# Patient Record
Sex: MALE | Race: WHITE | Employment: OTHER | URBAN - NONMETROPOLITAN AREA
[De-identification: names, ages, dates, MRNs, and addresses within clinical notes are randomized per-mention and may not be internally consistent; named-entity substitution may affect disease eponyms.]

---

## 2017-06-09 LAB
ANION GAP SERPL CALCULATED.3IONS-SCNC: 14 MMOL/L (ref 7–19)
BUN BLDV-MCNC: 14 MG/DL (ref 8–23)
CALCIUM SERPL-MCNC: 8.8 MG/DL (ref 8.8–10.2)
CHLORIDE BLD-SCNC: 102 MMOL/L (ref 98–111)
CO2: 27 MMOL/L (ref 22–29)
CREAT SERPL-MCNC: 0.8 MG/DL (ref 0.5–1.2)
GFR NON-AFRICAN AMERICAN: >60
GLUCOSE BLD-MCNC: 98 MG/DL (ref 74–109)
POTASSIUM SERPL-SCNC: 3.6 MMOL/L (ref 3.5–5)
SODIUM BLD-SCNC: 143 MMOL/L (ref 136–145)

## 2017-06-12 ENCOUNTER — OFFICE VISIT (OUTPATIENT)
Dept: INTERNAL MEDICINE | Age: 62
End: 2017-06-12
Payer: COMMERCIAL

## 2017-06-12 VITALS
HEIGHT: 66 IN | HEART RATE: 78 BPM | SYSTOLIC BLOOD PRESSURE: 126 MMHG | DIASTOLIC BLOOD PRESSURE: 82 MMHG | WEIGHT: 210 LBS | OXYGEN SATURATION: 96 % | TEMPERATURE: 97.6 F | BODY MASS INDEX: 33.75 KG/M2

## 2017-06-12 DIAGNOSIS — E66.3 OVERWEIGHT: Primary | ICD-10-CM

## 2017-06-12 DIAGNOSIS — I10 ESSENTIAL HYPERTENSION: ICD-10-CM

## 2017-06-12 DIAGNOSIS — F41.9 ANXIETY: ICD-10-CM

## 2017-06-12 DIAGNOSIS — R42 VERTIGO: ICD-10-CM

## 2017-06-12 PROCEDURE — 99213 OFFICE O/P EST LOW 20 MIN: CPT | Performed by: NURSE PRACTITIONER

## 2017-06-12 RX ORDER — CLINDAMYCIN HYDROCHLORIDE 300 MG/1
CAPSULE ORAL
COMMUNITY
Start: 2017-06-10 | End: 2018-04-17 | Stop reason: CLARIF

## 2017-06-12 RX ORDER — LORAZEPAM 1 MG/1
1 TABLET ORAL 2 TIMES DAILY
Qty: 60 TABLET | Refills: 0 | Status: SHIPPED | OUTPATIENT
Start: 2017-06-12 | End: 2017-07-24 | Stop reason: SDUPTHER

## 2017-06-12 RX ORDER — PHENTERMINE HYDROCHLORIDE 37.5 MG/1
37.5 TABLET ORAL
Qty: 30 TABLET | Refills: 0 | Status: SHIPPED | OUTPATIENT
Start: 2017-06-12 | End: 2017-07-12

## 2017-06-12 RX ORDER — BUPROPION HYDROCHLORIDE 75 MG/1
TABLET ORAL
Qty: 360 TABLET | Refills: 3 | Status: SHIPPED | OUTPATIENT
Start: 2017-06-12 | End: 2018-06-12 | Stop reason: SDUPTHER

## 2017-06-12 RX ORDER — MECLIZINE HYDROCHLORIDE 25 MG/1
25 TABLET ORAL 3 TIMES DAILY PRN
Qty: 180 TABLET | Refills: 3 | Status: SHIPPED | OUTPATIENT
Start: 2017-06-12 | End: 2019-09-10 | Stop reason: SDUPTHER

## 2017-06-12 ASSESSMENT — ENCOUNTER SYMPTOMS
COLOR CHANGE: 1
EYES NEGATIVE: 1
GASTROINTESTINAL NEGATIVE: 1
RESPIRATORY NEGATIVE: 1

## 2017-06-22 ENCOUNTER — HOSPITAL ENCOUNTER (EMERGENCY)
Facility: HOSPITAL | Age: 62
Discharge: HOME | End: 2017-06-22
Admitting: NURSE PRACTITIONER

## 2017-06-22 ENCOUNTER — APPOINTMENT (OUTPATIENT)
Dept: CT IMAGING | Facility: HOSPITAL | Age: 62
End: 2017-06-22

## 2017-06-22 ENCOUNTER — APPOINTMENT (OUTPATIENT)
Dept: GENERAL RADIOLOGY | Facility: HOSPITAL | Age: 62
End: 2017-06-22

## 2017-06-22 VITALS
RESPIRATION RATE: 17 BRPM | TEMPERATURE: 98.2 F | WEIGHT: 210.2 LBS | HEART RATE: 90 BPM | BODY MASS INDEX: 33.78 KG/M2 | DIASTOLIC BLOOD PRESSURE: 84 MMHG | SYSTOLIC BLOOD PRESSURE: 148 MMHG | OXYGEN SATURATION: 96 % | HEIGHT: 66 IN

## 2017-06-22 DIAGNOSIS — S43.401A SPRAIN SHOULDER/ARM, RIGHT, INITIAL ENCOUNTER: ICD-10-CM

## 2017-06-22 DIAGNOSIS — S20.212A CONTUSION OF RIB ON LEFT SIDE, INITIAL ENCOUNTER: ICD-10-CM
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
## 2017-06-22 PROCEDURE — 70450 CT HEAD/BRAIN W/O DYE: CPT

## 2017-06-22 PROCEDURE — 71020 HC CHEST PA AND LATERAL: CPT

## 2017-06-22 PROCEDURE — 73130 X-RAY EXAM OF HAND: CPT

## 2017-06-22 PROCEDURE — 99283 EMERGENCY DEPT VISIT LOW MDM: CPT

## 2017-06-22 PROCEDURE — 73090 X-RAY EXAM OF FOREARM: CPT

## 2017-06-22 RX ORDER — MECLIZINE HYDROCHLORIDE 25 MG/1
TABLET ORAL
COMMUNITY
Start: 2017-06-12 | End: 2017-09-10

## 2017-06-22 RX ORDER — PHENTERMINE HYDROCHLORIDE 37.5 MG/1
TABLET ORAL
COMMUNITY
Start: 2017-06-12 | End: 2017-07-12

## 2017-06-22 RX ORDER — BUPROPION HYDROCHLORIDE 75 MG/1
TABLET ORAL
COMMUNITY
Start: 2017-06-12 | End: 2022-05-10

## 2017-06-22 RX ORDER — SIMVASTATIN 20 MG
20 TABLET ORAL NIGHTLY
COMMUNITY

## 2017-06-22 RX ORDER — GABAPENTIN 300 MG/1
CAPSULE ORAL
COMMUNITY
End: 2022-05-10

## 2017-06-22 RX ORDER — IRBESARTAN 300 MG/1
300 TABLET ORAL DAILY
COMMUNITY

## 2017-06-22 RX ORDER — MELOXICAM 7.5 MG/1
7.5 TABLET ORAL DAILY
Qty: 15 TABLET | Refills: 0 | Status: SHIPPED | OUTPATIENT
Start: 2017-06-22 | End: 2022-05-10

## 2017-06-22 RX ORDER — ACETAMINOPHEN AND CODEINE PHOSPHATE 60; 300 MG/1; MG/1
1 TABLET ORAL EVERY 6 HOURS PRN
COMMUNITY

## 2017-06-22 RX ORDER — CLONIDINE HYDROCHLORIDE 0.1 MG/1
TABLET ORAL
COMMUNITY
End: 2022-05-10

## 2017-06-22 RX ORDER — FUROSEMIDE 20 MG/1
20 TABLET ORAL DAILY
COMMUNITY

## 2017-06-22 RX ORDER — CYCLOBENZAPRINE HCL 10 MG
10 TABLET ORAL 3 TIMES DAILY PRN
Qty: 15 TABLET | Refills: 0 | Status: SHIPPED | OUTPATIENT
Start: 2017-06-22 | End: 2022-05-10

## 2017-06-22 RX ORDER — CLINDAMYCIN HYDROCHLORIDE 300 MG/1
CAPSULE ORAL
COMMUNITY
Start: 2017-06-10 | End: 2022-05-10

## 2017-06-22 RX ORDER — LORAZEPAM 1 MG/1
1 TABLET ORAL EVERY 6 HOURS PRN
COMMUNITY
Start: 2017-06-12

## 2017-06-23 NOTE — DISCHARGE INSTRUCTIONS
Warm moist heat to the areas; apply thin layer bacitracin bid to the forehead abrasion; may continue pain medication as prescribed by pcp; return with any acute or worsening sxs
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
## 2017-07-24 DIAGNOSIS — F41.9 ANXIETY: ICD-10-CM

## 2017-07-24 RX ORDER — PHENTERMINE HYDROCHLORIDE 37.5 MG/1
37.5 CAPSULE ORAL EVERY MORNING
Qty: 30 CAPSULE | Refills: 0 | Status: SHIPPED | OUTPATIENT
Start: 2017-07-24 | End: 2017-08-23

## 2017-07-24 RX ORDER — ACETAMINOPHEN AND CODEINE PHOSPHATE 60; 300 MG/1; MG/1
1 TABLET ORAL 2 TIMES DAILY
Qty: 60 TABLET | Refills: 1 | Status: SHIPPED | OUTPATIENT
Start: 2017-07-24 | End: 2017-10-16 | Stop reason: SDUPTHER

## 2017-07-24 RX ORDER — LORAZEPAM 1 MG/1
1 TABLET ORAL 2 TIMES DAILY
Qty: 60 TABLET | Refills: 0 | Status: SHIPPED | OUTPATIENT
Start: 2017-07-24 | End: 2017-11-29 | Stop reason: SDUPTHER

## 2017-09-05 RX ORDER — LORAZEPAM 1 MG/1
1 TABLET ORAL 2 TIMES DAILY PRN
Qty: 60 TABLET | Refills: 1 | Status: SHIPPED | OUTPATIENT
Start: 2017-09-05 | End: 2018-01-26 | Stop reason: SDUPTHER

## 2017-09-05 RX ORDER — PHENTERMINE HYDROCHLORIDE 37.5 MG/1
37.5 TABLET ORAL
Qty: 30 TABLET | Refills: 0 | Status: SHIPPED | OUTPATIENT
Start: 2017-09-05 | End: 2017-10-05

## 2017-10-16 RX ORDER — ACETAMINOPHEN AND CODEINE PHOSPHATE 60; 300 MG/1; MG/1
1 TABLET ORAL 2 TIMES DAILY
Qty: 60 TABLET | Refills: 1 | Status: SHIPPED | OUTPATIENT
Start: 2017-10-16 | End: 2018-01-26 | Stop reason: SDUPTHER

## 2017-11-29 DIAGNOSIS — F41.9 ANXIETY: ICD-10-CM

## 2017-11-29 RX ORDER — LORAZEPAM 1 MG/1
1 TABLET ORAL 2 TIMES DAILY
Qty: 60 TABLET | Refills: 0 | Status: SHIPPED | OUTPATIENT
Start: 2017-11-29 | End: 2018-04-17 | Stop reason: CLARIF

## 2018-01-26 DIAGNOSIS — F41.9 ANXIETY: ICD-10-CM

## 2018-01-26 RX ORDER — LORAZEPAM 1 MG/1
1 TABLET ORAL 2 TIMES DAILY PRN
Qty: 60 TABLET | Refills: 1 | Status: CANCELLED | OUTPATIENT
Start: 2018-01-26 | End: 2019-01-21

## 2018-01-26 RX ORDER — ACETAMINOPHEN AND CODEINE PHOSPHATE 300; 60 MG/1; MG/1
1 TABLET ORAL EVERY 4 HOURS PRN
Qty: 60 TABLET | Refills: 1 | Status: CANCELLED | OUTPATIENT
Start: 2018-01-26 | End: 2019-01-21

## 2018-01-29 RX ORDER — LORAZEPAM 1 MG/1
1 TABLET ORAL 2 TIMES DAILY PRN
Qty: 60 TABLET | Refills: 0 | Status: SHIPPED | OUTPATIENT
Start: 2018-01-29 | End: 2018-03-19 | Stop reason: SDUPTHER

## 2018-01-29 RX ORDER — ACETAMINOPHEN AND CODEINE PHOSPHATE 60; 300 MG/1; MG/1
1 TABLET ORAL 2 TIMES DAILY
Qty: 60 TABLET | Refills: 0 | Status: SHIPPED | OUTPATIENT
Start: 2018-01-29 | End: 2018-06-20 | Stop reason: SDUPTHER

## 2018-03-19 RX ORDER — LORAZEPAM 1 MG/1
1 TABLET ORAL 2 TIMES DAILY PRN
Qty: 60 TABLET | Refills: 0 | Status: SHIPPED | OUTPATIENT
Start: 2018-03-19 | End: 2018-05-07 | Stop reason: SDUPTHER

## 2018-03-19 RX ORDER — ACETAMINOPHEN AND CODEINE PHOSPHATE 300; 60 MG/1; MG/1
1 TABLET ORAL EVERY 6 HOURS PRN
Qty: 120 TABLET | Refills: 0 | Status: SHIPPED | OUTPATIENT
Start: 2018-03-19 | End: 2018-05-07 | Stop reason: SDUPTHER

## 2018-04-17 ENCOUNTER — OFFICE VISIT (OUTPATIENT)
Dept: INTERNAL MEDICINE | Age: 63
End: 2018-04-17
Payer: COMMERCIAL

## 2018-04-17 VITALS
SYSTOLIC BLOOD PRESSURE: 134 MMHG | BODY MASS INDEX: 34.39 KG/M2 | DIASTOLIC BLOOD PRESSURE: 86 MMHG | WEIGHT: 214 LBS | HEART RATE: 63 BPM | RESPIRATION RATE: 16 BRPM | HEIGHT: 66 IN | OXYGEN SATURATION: 94 %

## 2018-04-17 DIAGNOSIS — E78.2 MIXED HYPERLIPIDEMIA: ICD-10-CM

## 2018-04-17 DIAGNOSIS — Z00.00 HEALTH CARE MAINTENANCE: ICD-10-CM

## 2018-04-17 DIAGNOSIS — I49.9 IRREGULAR HEART RATE: ICD-10-CM

## 2018-04-17 DIAGNOSIS — I10 BENIGN ESSENTIAL HTN: Primary | ICD-10-CM

## 2018-04-17 LAB
ALBUMIN SERPL-MCNC: 4.7 G/DL (ref 3.5–5.2)
ALP BLD-CCNC: 82 U/L (ref 40–130)
ALT SERPL-CCNC: 32 U/L (ref 5–41)
ANION GAP SERPL CALCULATED.3IONS-SCNC: 15 MMOL/L (ref 7–19)
AST SERPL-CCNC: 24 U/L (ref 5–40)
BILIRUB SERPL-MCNC: 0.6 MG/DL (ref 0.2–1.2)
BUN BLDV-MCNC: 14 MG/DL (ref 8–23)
CALCIUM SERPL-MCNC: 9.7 MG/DL (ref 8.8–10.2)
CHLORIDE BLD-SCNC: 103 MMOL/L (ref 98–111)
CHOLESTEROL, TOTAL: 205 MG/DL (ref 160–199)
CO2: 28 MMOL/L (ref 22–29)
CREAT SERPL-MCNC: 0.7 MG/DL (ref 0.5–1.2)
GFR NON-AFRICAN AMERICAN: >60
GLUCOSE BLD-MCNC: 94 MG/DL (ref 74–109)
HCT VFR BLD CALC: 46.5 % (ref 42–52)
HDLC SERPL-MCNC: 63 MG/DL (ref 55–121)
HEMOGLOBIN: 15.9 G/DL (ref 14–18)
LDL CHOLESTEROL CALCULATED: 116 MG/DL
MCH RBC QN AUTO: 33.9 PG (ref 27–31)
MCHC RBC AUTO-ENTMCNC: 34.2 G/DL (ref 33–37)
MCV RBC AUTO: 99.1 FL (ref 80–94)
PDW BLD-RTO: 12.9 % (ref 11.5–14.5)
PLATELET # BLD: 246 K/UL (ref 130–400)
PMV BLD AUTO: 10.2 FL (ref 9.4–12.4)
POTASSIUM SERPL-SCNC: 3.8 MMOL/L (ref 3.5–5)
RBC # BLD: 4.69 M/UL (ref 4.7–6.1)
SODIUM BLD-SCNC: 146 MMOL/L (ref 136–145)
TOTAL PROTEIN: 7.7 G/DL (ref 6.6–8.7)
TRIGL SERPL-MCNC: 129 MG/DL (ref 0–149)
WBC # BLD: 6 K/UL (ref 4.8–10.8)

## 2018-04-17 PROCEDURE — 99214 OFFICE O/P EST MOD 30 MIN: CPT | Performed by: NURSE PRACTITIONER

## 2018-04-17 PROCEDURE — 93000 ELECTROCARDIOGRAM COMPLETE: CPT | Performed by: NURSE PRACTITIONER

## 2018-04-17 ASSESSMENT — PATIENT HEALTH QUESTIONNAIRE - PHQ9
SUM OF ALL RESPONSES TO PHQ QUESTIONS 1-9: 0
1. LITTLE INTEREST OR PLEASURE IN DOING THINGS: 0
SUM OF ALL RESPONSES TO PHQ9 QUESTIONS 1 & 2: 0
2. FEELING DOWN, DEPRESSED OR HOPELESS: 0

## 2018-04-17 ASSESSMENT — ENCOUNTER SYMPTOMS
SHORTNESS OF BREATH: 0
COUGH: 0
VOMITING: 0
ABDOMINAL DISTENTION: 0
EYE DISCHARGE: 0
BLOOD IN STOOL: 0
SORE THROAT: 0
DIARRHEA: 0
CONSTIPATION: 0
NAUSEA: 0
EYE ITCHING: 0
COLOR CHANGE: 0
ABDOMINAL PAIN: 0
CHOKING: 0
TROUBLE SWALLOWING: 0
STRIDOR: 0
WHEEZING: 0

## 2018-04-18 LAB — HEPATITIS C ANTIBODY INTERPRETATION: NORMAL

## 2018-05-07 RX ORDER — LORAZEPAM 1 MG/1
1 TABLET ORAL 2 TIMES DAILY PRN
Qty: 60 TABLET | Refills: 0 | Status: SHIPPED | OUTPATIENT
Start: 2018-05-07 | End: 2018-06-20 | Stop reason: SDUPTHER

## 2018-05-07 RX ORDER — ACETAMINOPHEN AND CODEINE PHOSPHATE 300; 60 MG/1; MG/1
1 TABLET ORAL EVERY 6 HOURS PRN
Qty: 120 TABLET | Refills: 0 | Status: SHIPPED | OUTPATIENT
Start: 2018-05-07 | End: 2018-07-30 | Stop reason: SDUPTHER

## 2018-06-12 DIAGNOSIS — E66.3 OVERWEIGHT: ICD-10-CM

## 2018-06-12 RX ORDER — CLONIDINE HYDROCHLORIDE 0.1 MG/1
0.1 TABLET ORAL PRN
Qty: 180 TABLET | Refills: 3 | Status: SHIPPED | OUTPATIENT
Start: 2018-06-12

## 2018-06-12 RX ORDER — FUROSEMIDE 20 MG/1
20 TABLET ORAL DAILY
Qty: 180 TABLET | Refills: 3 | Status: SHIPPED | OUTPATIENT
Start: 2018-06-12 | End: 2018-10-05 | Stop reason: SDUPTHER

## 2018-06-12 RX ORDER — IRBESARTAN 300 MG/1
300 TABLET ORAL NIGHTLY
Qty: 90 TABLET | Refills: 3 | Status: SHIPPED | OUTPATIENT
Start: 2018-06-12 | End: 2019-01-15 | Stop reason: SDUPTHER

## 2018-06-12 RX ORDER — BUPROPION HYDROCHLORIDE 75 MG/1
TABLET ORAL
Qty: 360 TABLET | Refills: 3 | Status: SHIPPED | OUTPATIENT
Start: 2018-06-12 | End: 2019-09-10 | Stop reason: SDUPTHER

## 2018-06-12 RX ORDER — SIMVASTATIN 20 MG
20 TABLET ORAL NIGHTLY
Qty: 90 TABLET | Refills: 3 | Status: SHIPPED | OUTPATIENT
Start: 2018-06-12 | End: 2018-11-02 | Stop reason: SDUPTHER

## 2018-06-20 DIAGNOSIS — G60.9 IDIOPATHIC PERIPHERAL NEUROPATHY: Primary | ICD-10-CM

## 2018-06-20 DIAGNOSIS — F41.9 ANXIETY: ICD-10-CM

## 2018-06-20 DIAGNOSIS — G89.4 CHRONIC PAIN SYNDROME: ICD-10-CM

## 2018-06-20 RX ORDER — LORAZEPAM 1 MG/1
1 TABLET ORAL 2 TIMES DAILY PRN
Qty: 60 TABLET | Refills: 0 | Status: SHIPPED | OUTPATIENT
Start: 2018-06-20 | End: 2018-07-30 | Stop reason: SDUPTHER

## 2018-06-20 RX ORDER — ACETAMINOPHEN AND CODEINE PHOSPHATE 60; 300 MG/1; MG/1
1 TABLET ORAL 2 TIMES DAILY
Qty: 60 TABLET | Refills: 0 | Status: SHIPPED | OUTPATIENT
Start: 2018-06-20 | End: 2018-12-19 | Stop reason: SDUPTHER

## 2018-06-20 RX ORDER — GABAPENTIN 300 MG/1
300 CAPSULE ORAL 3 TIMES DAILY
Qty: 90 CAPSULE | Refills: 2 | Status: SHIPPED | OUTPATIENT
Start: 2018-06-20 | End: 2019-01-14

## 2018-07-30 DIAGNOSIS — G89.4 CHRONIC PAIN SYNDROME: Primary | ICD-10-CM

## 2018-07-30 DIAGNOSIS — F41.9 ANXIETY: ICD-10-CM

## 2018-07-30 RX ORDER — LORAZEPAM 1 MG/1
1 TABLET ORAL 2 TIMES DAILY PRN
Qty: 60 TABLET | Refills: 0 | Status: SHIPPED | OUTPATIENT
Start: 2018-07-30 | End: 2018-09-21 | Stop reason: SDUPTHER

## 2018-07-30 RX ORDER — ACETAMINOPHEN AND CODEINE PHOSPHATE 300; 60 MG/1; MG/1
1 TABLET ORAL EVERY 6 HOURS PRN
Qty: 120 TABLET | Refills: 0 | Status: SHIPPED | OUTPATIENT
Start: 2018-07-30 | End: 2018-09-21 | Stop reason: SDUPTHER

## 2018-09-21 DIAGNOSIS — F41.9 ANXIETY: ICD-10-CM

## 2018-09-21 DIAGNOSIS — G89.4 CHRONIC PAIN SYNDROME: ICD-10-CM

## 2018-09-21 RX ORDER — LORAZEPAM 1 MG/1
1 TABLET ORAL 2 TIMES DAILY PRN
Qty: 60 TABLET | Refills: 0 | Status: SHIPPED | OUTPATIENT
Start: 2018-09-21 | End: 2018-11-02 | Stop reason: SDUPTHER

## 2018-09-21 RX ORDER — ACETAMINOPHEN AND CODEINE PHOSPHATE 300; 60 MG/1; MG/1
1 TABLET ORAL EVERY 6 HOURS PRN
Qty: 120 TABLET | Refills: 0 | Status: SHIPPED | OUTPATIENT
Start: 2018-09-21 | End: 2019-03-05 | Stop reason: SDUPTHER

## 2018-09-21 NOTE — TELEPHONE ENCOUNTER
Jen called requesting a refill of the below medication which has been pended for you:     Requested Prescriptions     Pending Prescriptions Disp Refills    LORazepam (ATIVAN) 1 MG tablet 60 tablet 0     Sig: Take 1 tablet by mouth 2 times daily as needed for Anxiety for up to 30 days. Noemi Mode acetaminophen-codeine (TYLENOL #4) 300-60 MG per tablet 120 tablet 0     Sig: Take 1 tablet by mouth every 6 hours as needed for Pain for up to 30 days. .       Last Appointment Date: 4/17/2018  Next Appointment Date: Visit date not found    Allergies   Allergen Reactions    Nitroglycerin Other (See Comments)     Blood pressure problems, very bad, happened in [facility]. Blood pressure problems, very bad, happened in [facility].     Nitroglycerin

## 2018-10-05 ENCOUNTER — OFFICE VISIT (OUTPATIENT)
Dept: INTERNAL MEDICINE | Age: 63
End: 2018-10-05

## 2018-10-05 VITALS
HEART RATE: 86 BPM | TEMPERATURE: 97.2 F | SYSTOLIC BLOOD PRESSURE: 142 MMHG | BODY MASS INDEX: 32.95 KG/M2 | OXYGEN SATURATION: 96 % | WEIGHT: 205 LBS | HEIGHT: 66 IN | DIASTOLIC BLOOD PRESSURE: 96 MMHG | RESPIRATION RATE: 16 BRPM

## 2018-10-05 DIAGNOSIS — J02.9 PHARYNGITIS, UNSPECIFIED ETIOLOGY: Primary | ICD-10-CM

## 2018-10-05 PROCEDURE — 99214 OFFICE O/P EST MOD 30 MIN: CPT | Performed by: NURSE PRACTITIONER

## 2018-10-05 PROCEDURE — 96372 THER/PROPH/DIAG INJ SC/IM: CPT | Performed by: NURSE PRACTITIONER

## 2018-10-05 RX ORDER — AMOXICILLIN 500 MG/1
500 CAPSULE ORAL 3 TIMES DAILY
Qty: 21 CAPSULE | Refills: 0 | Status: SHIPPED | OUTPATIENT
Start: 2018-10-05 | End: 2018-10-12

## 2018-10-05 RX ORDER — METHYLPREDNISOLONE ACETATE 80 MG/ML
80 INJECTION, SUSPENSION INTRA-ARTICULAR; INTRALESIONAL; INTRAMUSCULAR; SOFT TISSUE ONCE
Status: COMPLETED | OUTPATIENT
Start: 2018-10-05 | End: 2018-10-05

## 2018-10-05 RX ORDER — FUROSEMIDE 20 MG/1
20 TABLET ORAL DAILY
Qty: 180 TABLET | Refills: 3 | Status: SHIPPED | OUTPATIENT
Start: 2018-10-05 | End: 2019-09-10 | Stop reason: SDUPTHER

## 2018-10-05 RX ADMIN — METHYLPREDNISOLONE ACETATE 80 MG: 80 INJECTION, SUSPENSION INTRA-ARTICULAR; INTRALESIONAL; INTRAMUSCULAR; SOFT TISSUE at 10:11

## 2018-10-05 ASSESSMENT — ENCOUNTER SYMPTOMS
EYE ITCHING: 0
STRIDOR: 0
DIARRHEA: 0
ABDOMINAL DISTENTION: 0
SHORTNESS OF BREATH: 0
ABDOMINAL PAIN: 0
WHEEZING: 0
BLOOD IN STOOL: 0
TROUBLE SWALLOWING: 0
NAUSEA: 0
CHOKING: 0
EYE DISCHARGE: 0
SORE THROAT: 1
COLOR CHANGE: 0
COUGH: 0
VOMITING: 0
CONSTIPATION: 0

## 2018-10-05 NOTE — PROGRESS NOTES
defined types were placed in this encounter. Follow-up:  No Follow-up on file. PATIENT INSTRUCTIONS:  Patient Instructions   1,  Acute pharyngitis; Amoxicillin;   visous lidocaine; Mix with mylanta or honey;    Medrol 80 IM      Fu as needed     Electronically signed by EMILIA Ferro on 10/5/2018 at 11:37 AM    EMR Dragon/transcription disclaimer:  Much of this encounter note is electronic transcription/translation of spoken language to printed texts. The electronic translation of spoken language may be erroneous, or at times, nonsensical words or phrases may be inadvertently transcribed.   Although I have reviewed the note for such errors, some may still exist.

## 2018-11-02 DIAGNOSIS — F41.9 ANXIETY: ICD-10-CM

## 2018-11-02 RX ORDER — LORAZEPAM 1 MG/1
1 TABLET ORAL 2 TIMES DAILY PRN
Qty: 60 TABLET | Refills: 0 | Status: SHIPPED | OUTPATIENT
Start: 2018-11-02 | End: 2018-12-13 | Stop reason: SDUPTHER

## 2018-11-02 RX ORDER — SIMVASTATIN 20 MG
20 TABLET ORAL NIGHTLY
Qty: 90 TABLET | Refills: 3 | Status: SHIPPED | OUTPATIENT
Start: 2018-11-02 | End: 2019-04-26 | Stop reason: SDUPTHER

## 2018-12-13 DIAGNOSIS — F41.9 ANXIETY: ICD-10-CM

## 2018-12-13 NOTE — TELEPHONE ENCOUNTER
Pt's pharmacy called to get a refill on the following medication which has been pended for you:    Requested Prescriptions     Pending Prescriptions Disp Refills    LORazepam (ATIVAN) 1 MG tablet 60 tablet 0     Sig: Take 1 tablet by mouth 2 times daily as needed for Anxiety for up to 30 days. Jack Hammer      Next appointment: none found  Last appointment: 10/05/2018

## 2018-12-18 RX ORDER — LORAZEPAM 1 MG/1
1 TABLET ORAL 2 TIMES DAILY PRN
Qty: 60 TABLET | Refills: 0 | Status: SHIPPED | OUTPATIENT
Start: 2018-12-18 | End: 2018-12-19 | Stop reason: SDUPTHER

## 2018-12-19 DIAGNOSIS — F41.9 ANXIETY: ICD-10-CM

## 2018-12-19 DIAGNOSIS — G89.4 CHRONIC PAIN SYNDROME: ICD-10-CM

## 2018-12-19 RX ORDER — ACETAMINOPHEN AND CODEINE PHOSPHATE 60; 300 MG/1; MG/1
1 TABLET ORAL 2 TIMES DAILY
Qty: 60 TABLET | Refills: 0 | Status: SHIPPED | OUTPATIENT
Start: 2018-12-19 | End: 2019-01-14 | Stop reason: SDUPTHER

## 2018-12-19 RX ORDER — LORAZEPAM 1 MG/1
1 TABLET ORAL 2 TIMES DAILY PRN
Qty: 60 TABLET | Refills: 0 | Status: SHIPPED | OUTPATIENT
Start: 2018-12-19 | End: 2019-01-14 | Stop reason: SDUPTHER

## 2019-01-14 ENCOUNTER — OFFICE VISIT (OUTPATIENT)
Dept: INTERNAL MEDICINE | Age: 64
End: 2019-01-14
Payer: COMMERCIAL

## 2019-01-14 VITALS
WEIGHT: 206 LBS | RESPIRATION RATE: 16 BRPM | OXYGEN SATURATION: 97 % | SYSTOLIC BLOOD PRESSURE: 126 MMHG | HEIGHT: 66 IN | BODY MASS INDEX: 33.11 KG/M2 | HEART RATE: 76 BPM | DIASTOLIC BLOOD PRESSURE: 88 MMHG

## 2019-01-14 DIAGNOSIS — Z00.00 HEALTHCARE MAINTENANCE: ICD-10-CM

## 2019-01-14 DIAGNOSIS — G89.4 CHRONIC PAIN SYNDROME: ICD-10-CM

## 2019-01-14 DIAGNOSIS — K21.9 GASTROESOPHAGEAL REFLUX DISEASE WITHOUT ESOPHAGITIS: ICD-10-CM

## 2019-01-14 DIAGNOSIS — F41.9 ANXIETY: ICD-10-CM

## 2019-01-14 DIAGNOSIS — I10 BENIGN ESSENTIAL HTN: Primary | ICD-10-CM

## 2019-01-14 LAB
ALBUMIN SERPL-MCNC: 4.7 G/DL (ref 3.5–5.2)
ALP BLD-CCNC: 84 U/L (ref 40–130)
ALT SERPL-CCNC: 44 U/L (ref 5–41)
ANION GAP SERPL CALCULATED.3IONS-SCNC: 15 MMOL/L (ref 7–19)
AST SERPL-CCNC: 27 U/L (ref 5–40)
BASOPHILS ABSOLUTE: 0.1 K/UL (ref 0–0.2)
BASOPHILS RELATIVE PERCENT: 0.8 % (ref 0–1)
BILIRUB SERPL-MCNC: 0.6 MG/DL (ref 0.2–1.2)
BUN BLDV-MCNC: 16 MG/DL (ref 8–23)
CALCIUM SERPL-MCNC: 9.8 MG/DL (ref 8.8–10.2)
CHLORIDE BLD-SCNC: 101 MMOL/L (ref 98–111)
CHOLESTEROL, TOTAL: 220 MG/DL (ref 160–199)
CO2: 29 MMOL/L (ref 22–29)
CREAT SERPL-MCNC: 0.8 MG/DL (ref 0.5–1.2)
EOSINOPHILS ABSOLUTE: 0.1 K/UL (ref 0–0.6)
EOSINOPHILS RELATIVE PERCENT: 1.4 % (ref 0–5)
GFR NON-AFRICAN AMERICAN: >60
GLUCOSE BLD-MCNC: 96 MG/DL (ref 74–109)
HCT VFR BLD CALC: 48.9 % (ref 42–52)
HDLC SERPL-MCNC: 87 MG/DL (ref 55–121)
HEMOGLOBIN: 16.4 G/DL (ref 14–18)
LDL CHOLESTEROL CALCULATED: 80 MG/DL
LYMPHOCYTES ABSOLUTE: 2.2 K/UL (ref 1.1–4.5)
LYMPHOCYTES RELATIVE PERCENT: 34.3 % (ref 20–40)
MCH RBC QN AUTO: 34 PG (ref 27–31)
MCHC RBC AUTO-ENTMCNC: 33.5 G/DL (ref 33–37)
MCV RBC AUTO: 101.2 FL (ref 80–94)
MONOCYTES ABSOLUTE: 0.5 K/UL (ref 0–0.9)
MONOCYTES RELATIVE PERCENT: 7.1 % (ref 0–10)
NEUTROPHILS ABSOLUTE: 3.6 K/UL (ref 1.5–7.5)
NEUTROPHILS RELATIVE PERCENT: 55.2 % (ref 50–65)
PDW BLD-RTO: 13.1 % (ref 11.5–14.5)
PLATELET # BLD: 241 K/UL (ref 130–400)
PMV BLD AUTO: 9.4 FL (ref 9.4–12.4)
POTASSIUM SERPL-SCNC: 4 MMOL/L (ref 3.5–5)
PROSTATE SPECIFIC ANTIGEN: 1.71 NG/ML (ref 0–4)
RBC # BLD: 4.83 M/UL (ref 4.7–6.1)
SODIUM BLD-SCNC: 145 MMOL/L (ref 136–145)
TOTAL PROTEIN: 7.9 G/DL (ref 6.6–8.7)
TRIGL SERPL-MCNC: 263 MG/DL (ref 0–149)
TSH SERPL DL<=0.05 MIU/L-ACNC: 2.56 UIU/ML (ref 0.27–4.2)
VITAMIN D 25-HYDROXY: 80.2 NG/ML
WBC # BLD: 6.5 K/UL (ref 4.8–10.8)

## 2019-01-14 PROCEDURE — 99214 OFFICE O/P EST MOD 30 MIN: CPT | Performed by: NURSE PRACTITIONER

## 2019-01-14 PROCEDURE — G8427 DOCREV CUR MEDS BY ELIG CLIN: HCPCS | Performed by: NURSE PRACTITIONER

## 2019-01-14 PROCEDURE — G8484 FLU IMMUNIZE NO ADMIN: HCPCS | Performed by: NURSE PRACTITIONER

## 2019-01-14 PROCEDURE — 1036F TOBACCO NON-USER: CPT | Performed by: NURSE PRACTITIONER

## 2019-01-14 PROCEDURE — 3017F COLORECTAL CA SCREEN DOC REV: CPT | Performed by: NURSE PRACTITIONER

## 2019-01-14 PROCEDURE — G8417 CALC BMI ABV UP PARAM F/U: HCPCS | Performed by: NURSE PRACTITIONER

## 2019-01-14 RX ORDER — ACETAMINOPHEN AND CODEINE PHOSPHATE 60; 300 MG/1; MG/1
1 TABLET ORAL 4 TIMES DAILY PRN
Qty: 120 TABLET | Refills: 0 | Status: SHIPPED | OUTPATIENT
Start: 2019-01-18 | End: 2019-01-23 | Stop reason: SDUPTHER

## 2019-01-14 RX ORDER — LORAZEPAM 1 MG/1
1 TABLET ORAL 2 TIMES DAILY PRN
Qty: 60 TABLET | Refills: 0 | Status: SHIPPED | OUTPATIENT
Start: 2019-01-18 | End: 2019-01-23 | Stop reason: SDUPTHER

## 2019-01-14 ASSESSMENT — ENCOUNTER SYMPTOMS
BACK PAIN: 1
CHOKING: 0
CONSTIPATION: 0
DIARRHEA: 0
SHORTNESS OF BREATH: 0
VOMITING: 0
COLOR CHANGE: 0
SORE THROAT: 0
ABDOMINAL DISTENTION: 0
EYE DISCHARGE: 0
NAUSEA: 0
COUGH: 0
TROUBLE SWALLOWING: 0
ABDOMINAL PAIN: 0
STRIDOR: 0
BLOOD IN STOOL: 0
EYE ITCHING: 0
WHEEZING: 0

## 2019-01-15 RX ORDER — IRBESARTAN 300 MG/1
300 TABLET ORAL NIGHTLY
Qty: 90 TABLET | Refills: 3 | Status: SHIPPED | OUTPATIENT
Start: 2019-01-15 | End: 2019-09-10 | Stop reason: SDUPTHER

## 2019-01-23 DIAGNOSIS — G89.4 CHRONIC PAIN SYNDROME: ICD-10-CM

## 2019-01-23 DIAGNOSIS — F41.9 ANXIETY: ICD-10-CM

## 2019-01-23 RX ORDER — LORAZEPAM 1 MG/1
1 TABLET ORAL 2 TIMES DAILY PRN
Qty: 60 TABLET | Refills: 0 | Status: SHIPPED | OUTPATIENT
Start: 2019-01-23 | End: 2019-03-05 | Stop reason: SDUPTHER

## 2019-01-23 RX ORDER — ACETAMINOPHEN AND CODEINE PHOSPHATE 60; 300 MG/1; MG/1
1 TABLET ORAL 4 TIMES DAILY PRN
Qty: 120 TABLET | Refills: 0 | Status: SHIPPED | OUTPATIENT
Start: 2019-01-23 | End: 2019-04-26 | Stop reason: SDUPTHER

## 2019-01-29 ENCOUNTER — TELEPHONE (OUTPATIENT)
Dept: INTERNAL MEDICINE | Age: 64
End: 2019-01-29

## 2019-03-05 DIAGNOSIS — F41.9 ANXIETY: ICD-10-CM

## 2019-03-05 DIAGNOSIS — G89.4 CHRONIC PAIN SYNDROME: ICD-10-CM

## 2019-03-05 RX ORDER — LORAZEPAM 1 MG/1
1 TABLET ORAL 2 TIMES DAILY PRN
Qty: 60 TABLET | Refills: 0 | Status: SHIPPED | OUTPATIENT
Start: 2019-03-05 | End: 2019-04-26 | Stop reason: SDUPTHER

## 2019-03-05 RX ORDER — ACETAMINOPHEN AND CODEINE PHOSPHATE 300; 60 MG/1; MG/1
1 TABLET ORAL EVERY 6 HOURS PRN
Qty: 120 TABLET | Refills: 0 | Status: SHIPPED | OUTPATIENT
Start: 2019-03-05 | End: 2019-09-10 | Stop reason: SDUPTHER

## 2019-04-26 DIAGNOSIS — F41.9 ANXIETY: ICD-10-CM

## 2019-04-26 DIAGNOSIS — G89.4 CHRONIC PAIN SYNDROME: ICD-10-CM

## 2019-04-29 RX ORDER — ACETAMINOPHEN AND CODEINE PHOSPHATE 60; 300 MG/1; MG/1
1 TABLET ORAL 4 TIMES DAILY PRN
Qty: 120 TABLET | Refills: 0 | Status: SHIPPED | OUTPATIENT
Start: 2019-04-29 | End: 2019-06-11 | Stop reason: SDUPTHER

## 2019-04-29 RX ORDER — SIMVASTATIN 20 MG
20 TABLET ORAL NIGHTLY
Qty: 90 TABLET | Refills: 3 | Status: SHIPPED | OUTPATIENT
Start: 2019-04-29 | End: 2019-09-10 | Stop reason: SDUPTHER

## 2019-04-29 RX ORDER — LORAZEPAM 1 MG/1
1 TABLET ORAL 2 TIMES DAILY PRN
Qty: 60 TABLET | Refills: 0 | Status: SHIPPED | OUTPATIENT
Start: 2019-04-29 | End: 2019-06-11 | Stop reason: SDUPTHER

## 2019-05-14 ENCOUNTER — OFFICE VISIT (OUTPATIENT)
Dept: INTERNAL MEDICINE | Age: 64
End: 2019-05-14
Payer: COMMERCIAL

## 2019-05-14 VITALS
WEIGHT: 213 LBS | BODY MASS INDEX: 34.23 KG/M2 | HEART RATE: 78 BPM | OXYGEN SATURATION: 98 % | DIASTOLIC BLOOD PRESSURE: 84 MMHG | RESPIRATION RATE: 18 BRPM | HEIGHT: 66 IN | SYSTOLIC BLOOD PRESSURE: 118 MMHG

## 2019-05-14 DIAGNOSIS — K21.9 GASTROESOPHAGEAL REFLUX DISEASE WITHOUT ESOPHAGITIS: ICD-10-CM

## 2019-05-14 DIAGNOSIS — F41.9 ANXIETY: ICD-10-CM

## 2019-05-14 DIAGNOSIS — Z00.00 HEALTHCARE MAINTENANCE: Primary | ICD-10-CM

## 2019-05-14 DIAGNOSIS — I10 BENIGN ESSENTIAL HTN: ICD-10-CM

## 2019-05-14 DIAGNOSIS — G89.4 CHRONIC PAIN SYNDROME: ICD-10-CM

## 2019-05-14 PROCEDURE — 99214 OFFICE O/P EST MOD 30 MIN: CPT | Performed by: NURSE PRACTITIONER

## 2019-05-14 ASSESSMENT — ENCOUNTER SYMPTOMS
ABDOMINAL DISTENTION: 0
SORE THROAT: 0
EYE DISCHARGE: 0
DIARRHEA: 0
EYE ITCHING: 0
CONSTIPATION: 0
ABDOMINAL PAIN: 0
BLOOD IN STOOL: 0
CHOKING: 0
COLOR CHANGE: 0
WHEEZING: 0
NAUSEA: 0
TROUBLE SWALLOWING: 0
VOMITING: 0
SHORTNESS OF BREATH: 0
COUGH: 0
STRIDOR: 0

## 2019-05-14 ASSESSMENT — PATIENT HEALTH QUESTIONNAIRE - PHQ9
SUM OF ALL RESPONSES TO PHQ9 QUESTIONS 1 & 2: 0
1. LITTLE INTEREST OR PLEASURE IN DOING THINGS: 0
2. FEELING DOWN, DEPRESSED OR HOPELESS: 0
SUM OF ALL RESPONSES TO PHQ QUESTIONS 1-9: 0
SUM OF ALL RESPONSES TO PHQ QUESTIONS 1-9: 0

## 2019-05-14 NOTE — PROGRESS NOTES
[facility]  [facility] [address]  Dept: [phone]  Dept Fax: [phone]  Loc: [phone]    Russell Martinez is a 59 y.o. male who presents today for his medical conditions/complaints as noted below. Russell Martinez is c/kait Migraine (Patient is here for routine follow up visit.) and Hypertension (Patient is here for routine follow up visit. Patient has not had labs done.)        HPI:     HPI   1. HTN:  Stable on current meds; No side effects of the meds; Takes as directed; takes blood pressure 3-4 times a week    2. Chronic knee pain;  Is he taking at least 1 per day of the tylenol #4   3. GERD;  Stable on current meds; no side effects and takes meds as directed   4. Anxiety; Stable on lorazepam  ;  No side effects of the meds and takes as needed     Chief Complaint   Patient presents with    Migraine     Patient is here for routine follow up visit.  Hypertension     Patient is here for routine follow up visit. Patient has not had labs done.        Past Medical History:   Diagnosis Date    Anxiety and depression     Atypical chest pain     Elevated lipids     Gastritis     GERD (gastroesophageal reflux disease)     Hepatitis A     Hiatal hernia     History of kidney stones     HTN (hypertension)     Hyperlipidemia     Hypertension     Irregular Z line of esophagus     Libido, decreased     Migraines       Past Surgical History:   Procedure Laterality Date    CHOLECYSTECTOMY      [facility]    COLONOSCOPY      COLONOSCOPY  10-3-06    Dr Jamel Marin    COLONOSCOPY  15-58-88    Dr Josselyn Winkler ARTHROSCOPY Bilateral     KNEE SURGERY Bilateral     2 X'S ON LEFT ONCE ON THE RIGHT    [facility]    UPPER GASTROINTESTINAL ENDOSCOPY      UPPER GASTROINTESTINAL ENDOSCOPY  8-30-01    Dr Cinderella Buerger  10-10-06    Dr Cinderella Buerger  11-18-10    Dr Quique Valdez 5/14/2019 1/14/2019 10/5/2018 4/17/2018 6/12/2017 0/56/2710   SYSTOLIC 576 289 602 [phone]   DIASTOLIC 84 88 96 86 82 84   Pulse 78 76 86 63 78 89   Temp - - 97.2 - 97.6 98.4   Resp 18 16 16 16 - 18   SpO2 98 97 96 94 96 98   Weight 213 lb 206 lb 205 lb 214 lb 210 lb 198 lb   Height 5' 6\" 5' 6\" 5' 6\" 5' 6\" 5' 6\" 5' 5\"   BMI (wt*703/ht~2) 34.38 kg/m2 33.25 kg/m2 33.08 kg/m2 34.54 kg/m2 33.89 kg/m2 32.95 kg/m2       Family History   Problem Relation Age of Onset    Heart Disease Father     Heart Disease Sister     Heart Disease Brother     Stomach Cancer Mother     Cancer Sister         hodgkins    Cancer Mother        Social History     Tobacco Use    Smoking status: Never Smoker    Smokeless tobacco: Never Used   Substance Use Topics    Alcohol use: Yes      Current Outpatient Medications   Medication Sig Dispense Refill    simvastatin (ZOCOR) 20 MG tablet Take 1 tablet by mouth nightly 90 tablet 3    acetaminophen-codeine (TYLENOL #4) 300-60 MG per tablet Take 1 tablet by mouth 4 times daily as needed for Pain for up to 30 days. 120 tablet 0    LORazepam (ATIVAN) 1 MG tablet Take 1 tablet by mouth 2 times daily as needed for Anxiety for up to 30 days. 60 tablet 0    acetaminophen-codeine (TYLENOL #4) 300-60 MG per tablet Take 1 tablet by mouth every 6 hours as needed for Pain for up to 30 days.  120 tablet 0    irbesartan (AVAPRO) 300 MG tablet Take 1 tablet by mouth nightly 90 tablet 3    lidocaine viscous (XYLOCAINE) 2 % solution Take 15 mLs by mouth as needed for Irritation 100 mL 1    furosemide (LASIX) 20 MG tablet Take 1 tablet by mouth daily 180 tablet 3    buPROPion (WELLBUTRIN) 75 MG tablet Take 1 tablet twice daily for 3 days, then 2 in am and 1 pm for 3 days, than 2 tabs  tablet 3 Results   Component Value Date    LDLCALC 80 01/14/2019    LDLCALC 116 04/17/2018     Lab Results   Component Value Date     01/14/2019     04/08/2011    K 4.0 01/14/2019    K 4.2 04/08/2011     01/14/2019     04/08/2011    CO2 29 01/14/2019    BUN 16 01/14/2019    CREATININE 0.8 01/14/2019    CREATININE 1.0 04/08/2011    GLUCOSE 96 01/14/2019    CALCIUM 9.8 01/14/2019      Lab Results   Component Value Date    WBC 6.5 01/14/2019    HGB 16.4 01/14/2019    HCT 48.9 01/14/2019    .2 (H) 01/14/2019     01/14/2019    LABLYMP 2.15 02/11/2015    LYMPHOPCT 34.3 01/14/2019    RBC 4.83 01/14/2019    MCH 34.0 (H) 01/14/2019    MCHC 33.5 01/14/2019    RDW 13.1 01/14/2019     Lab Results   Component Value Date    VITD25 80.2 01/14/2019       Subjective:      Review of Systems   Constitutional: Negative for fatigue, fever and unexpected weight change. HENT: Negative for ear discharge, ear pain, mouth sores, sore throat and trouble swallowing. Eyes: Negative for discharge, itching and visual disturbance. Respiratory: Negative for cough, choking, shortness of breath, wheezing and stridor. Cardiovascular: Negative for chest pain, palpitations and leg swelling. Gastrointestinal: Negative for abdominal distention, abdominal pain, blood in stool, constipation, diarrhea, nausea and vomiting. Endocrine: Negative for cold intolerance, polydipsia and polyuria. Genitourinary: Negative for difficulty urinating, dysuria, frequency and urgency. Musculoskeletal: Negative for arthralgias and gait problem. Skin: Negative for color change and rash. Allergic/Immunologic: Negative for food allergies and immunocompromised state. Neurological: Negative for dizziness, tremors, syncope, speech difficulty, weakness and headaches. Hematological: Negative for adenopathy. Does not bruise/bleed easily. Psychiatric/Behavioral: Negative for confusion and hallucinations.        Objective: Physical Exam   Constitutional: He is oriented to person, place, and time. He appears well-developed and well-nourished. No distress. HENT:   Head: Normocephalic and atraumatic. Eyes: Pupils are equal, round, and reactive to light. Right eye exhibits no discharge. Left eye exhibits no discharge. No scleral icterus. Neck: Normal range of motion. Neck supple. No JVD present. No thyromegaly present. Cardiovascular: Normal rate, regular rhythm and normal heart sounds. No murmur heard. Pulmonary/Chest: Effort normal and breath sounds normal. No respiratory distress. He has no wheezes. He has no rales. Abdominal: Soft. Bowel sounds are normal. He exhibits no distension and no mass. There is no tenderness. There is no rebound and no guarding. Musculoskeletal: Normal range of motion. He exhibits no edema or tenderness. Neurological: He is alert and oriented to person, place, and time. He has normal reflexes. He displays normal reflexes. No cranial nerve deficit. Coordination normal.   Skin: Skin is warm and dry. No rash noted. No erythema. Psychiatric: His behavior is normal. Judgment and thought content normal. He does not exhibit a depressed mood. /84   Pulse 78   Resp 18   Ht 5' 6\" (1.676 m)   Wt 213 lb (96.6 kg)   SpO2 98%   BMI 34.38 kg/m²     Assessment:       Diagnosis Orders   1. Healthcare maintenance  CBC Auto Differential    Comprehensive Metabolic Panel    Lipid Panel    Vitamin D 25 Hydroxy    TSH without Reflex   2. Benign essential HTN     3. Chronic pain syndrome     4. Gastroesophageal reflux disease without esophagitis     5. Anxiety         Plan:        Patient given educational materials - see patient instructions. Discussed use, benefit, and side effects of prescribed medications. Allpatient questions answered. Pt voiced understanding. Reviewed health maintenance. Instructed to continue current medications, diet and exercise.   Patient agreed with treatment plan. Follow up as directed. MEDICATIONS:  No orders of the defined types were placed in this encounter. ORDERS:  Orders Placed This Encounter   Procedures    CBC Auto Differential    Comprehensive Metabolic Panel    Lipid Panel    Vitamin D 25 Hydroxy    TSH without Reflex       Follow-up:  Return in about 6 months (around 11/14/2019) for have labs done prior to appt. PATIENT INSTRUCTIONS:  Patient Instructions   1. HTN  The current medical regimen is effective;  continue present plan and medications. 2.  Chronic bilateral knee pain; Stable  With current med regimen   3.  GErD;  Stable no changes;   4. Anxiety; The current medical regimen is effective;  continue present plan and medications. Electronically signed by EMILIA Dempsey on 5/14/2019 at 8:41 AM    EMRDragon/transcription disclaimer:  Much of this encounter note is electronic transcription/translation of spoken language to printed texts. The electronic translation of spoken language may be erroneous, or at times,nonsensical words or phrases may be inadvertently transcribed.   Although I have reviewed the note for such errors, some may still exist.

## 2019-05-14 NOTE — PATIENT INSTRUCTIONS
1.  HTN  The current medical regimen is effective;  continue present plan and medications. 2.  Chronic bilateral knee pain; Stable  With current med regimen   3.  GErD;  Stable no changes;   4. Anxiety; The current medical regimen is effective;  continue present plan and medications.

## 2019-05-16 ENCOUNTER — TELEPHONE (OUTPATIENT)
Dept: INTERNAL MEDICINE | Age: 64
End: 2019-05-16

## 2019-05-16 RX ORDER — AMOXICILLIN AND CLAVULANATE POTASSIUM 875; 125 MG/1; MG/1
1 TABLET, FILM COATED ORAL 2 TIMES DAILY
Qty: 20 TABLET | Refills: 0 | Status: SHIPPED | OUTPATIENT
Start: 2019-05-16 | End: 2019-05-26

## 2019-05-16 NOTE — TELEPHONE ENCOUNTER
needing an antibiotic for my throat.  I found out today that another person i work with has got it too and hers was diagnosed as strep.    I just want to make sure i get something today for it because i go back to [employer].        Augmentin sent to pharmacy

## 2019-06-11 DIAGNOSIS — G89.4 CHRONIC PAIN SYNDROME: ICD-10-CM

## 2019-06-11 DIAGNOSIS — F41.9 ANXIETY: ICD-10-CM

## 2019-06-11 RX ORDER — ACETAMINOPHEN AND CODEINE PHOSPHATE 60; 300 MG/1; MG/1
1 TABLET ORAL 4 TIMES DAILY PRN
Qty: 120 TABLET | Refills: 0 | Status: SHIPPED | OUTPATIENT
Start: 2019-06-11 | End: 2019-07-16 | Stop reason: SDUPTHER

## 2019-06-11 RX ORDER — LORAZEPAM 1 MG/1
1 TABLET ORAL 2 TIMES DAILY PRN
Qty: 60 TABLET | Refills: 0 | Status: SHIPPED | OUTPATIENT
Start: 2019-06-11 | End: 2019-07-16 | Stop reason: SDUPTHER

## 2019-07-16 DIAGNOSIS — F41.9 ANXIETY: ICD-10-CM

## 2019-07-16 DIAGNOSIS — G89.4 CHRONIC PAIN SYNDROME: ICD-10-CM

## 2019-07-17 RX ORDER — LORAZEPAM 1 MG/1
1 TABLET ORAL 2 TIMES DAILY PRN
Qty: 60 TABLET | Refills: 0 | Status: SHIPPED | OUTPATIENT
Start: 2019-07-17 | End: 2019-09-10 | Stop reason: SDUPTHER

## 2019-07-17 RX ORDER — ACETAMINOPHEN AND CODEINE PHOSPHATE 60; 300 MG/1; MG/1
1 TABLET ORAL 4 TIMES DAILY PRN
Qty: 120 TABLET | Refills: 0 | Status: SHIPPED | OUTPATIENT
Start: 2019-07-17 | End: 2019-11-19 | Stop reason: SDUPTHER

## 2019-09-09 ENCOUNTER — TELEPHONE (OUTPATIENT)
Dept: INTERNAL MEDICINE | Age: 64
End: 2019-09-09

## 2019-09-09 DIAGNOSIS — R42 VERTIGO: ICD-10-CM

## 2019-09-09 DIAGNOSIS — E66.3 OVERWEIGHT: ICD-10-CM

## 2019-09-09 DIAGNOSIS — F41.9 ANXIETY: ICD-10-CM

## 2019-09-09 DIAGNOSIS — G89.4 CHRONIC PAIN SYNDROME: ICD-10-CM

## 2019-09-09 NOTE — TELEPHONE ENCOUNTER
Jen called requesting a refill of the below medication which has been pended for you:     Requested Prescriptions     Pending Prescriptions Disp Refills    simvastatin (ZOCOR) 20 MG tablet 90 tablet 3     Sig: Take 1 tablet by mouth nightly    acetaminophen-codeine (TYLENOL #4) 300-60 MG per tablet 120 tablet 0     Sig: Take 1 tablet by mouth every 6 hours as needed for Pain for up to 70 days.  buPROPion (WELLBUTRIN) 75 MG tablet 360 tablet 3     Sig: Take 1 tablet twice daily for 3 days, then 2 in am and 1 pm for 3 days, than 2 tabs BID    LORazepam (ATIVAN) 1 MG tablet 60 tablet 0     Sig: Take 1 tablet by mouth 2 times daily as needed for Anxiety for up to 30 days.  meclizine (ANTIVERT) 25 MG tablet 180 tablet 3     Sig: Take 1 tablet by mouth 3 times daily as needed for Dizziness    irbesartan (AVAPRO) 300 MG tablet 90 tablet 3     Sig: Take 1 tablet by mouth nightly    furosemide (LASIX) 20 MG tablet 180 tablet 3     Sig: Take 1 tablet by mouth daily       Last Appointment Date: 5/14/2019  Next Appointment Date: 11/19/2019    Allergies   Allergen Reactions    Nitroglycerin Other (See Comments)     Blood pressure problems, very bad, happened in [place] [facility]. Blood pressure problems, very bad, happened in [facility]. Blood pressure problems, very bad, happened in [facility].     Nitroglycerin

## 2019-09-10 RX ORDER — FUROSEMIDE 20 MG/1
20 TABLET ORAL DAILY
Qty: 180 TABLET | Refills: 3 | Status: SHIPPED | OUTPATIENT
Start: 2019-09-10 | End: 2019-10-23 | Stop reason: SDUPTHER

## 2019-09-10 RX ORDER — ACETAMINOPHEN AND CODEINE PHOSPHATE 300; 60 MG/1; MG/1
1 TABLET ORAL EVERY 6 HOURS PRN
Qty: 120 TABLET | Refills: 0 | Status: SHIPPED | OUTPATIENT
Start: 2019-09-10 | End: 2019-10-23 | Stop reason: SDUPTHER

## 2019-09-10 RX ORDER — MECLIZINE HYDROCHLORIDE 25 MG/1
25 TABLET ORAL 3 TIMES DAILY PRN
Qty: 180 TABLET | Refills: 3 | Status: SHIPPED | OUTPATIENT
Start: 2019-09-10 | End: 2021-02-01 | Stop reason: SDUPTHER

## 2019-09-10 RX ORDER — LORAZEPAM 1 MG/1
1 TABLET ORAL 2 TIMES DAILY PRN
Qty: 60 TABLET | Refills: 0 | Status: SHIPPED | OUTPATIENT
Start: 2019-09-10 | End: 2019-10-23 | Stop reason: SDUPTHER

## 2019-09-10 RX ORDER — IRBESARTAN 300 MG/1
300 TABLET ORAL NIGHTLY
Qty: 90 TABLET | Refills: 3 | Status: SHIPPED | OUTPATIENT
Start: 2019-09-10 | End: 2019-10-23 | Stop reason: SDUPTHER

## 2019-09-10 RX ORDER — BUPROPION HYDROCHLORIDE 75 MG/1
TABLET ORAL
Qty: 360 TABLET | Refills: 3 | Status: SHIPPED | OUTPATIENT
Start: 2019-09-10 | End: 2019-10-23 | Stop reason: SDUPTHER

## 2019-09-10 RX ORDER — SIMVASTATIN 20 MG
20 TABLET ORAL NIGHTLY
Qty: 90 TABLET | Refills: 3 | Status: SHIPPED | OUTPATIENT
Start: 2019-09-10 | End: 2019-10-23 | Stop reason: SDUPTHER

## 2019-10-23 DIAGNOSIS — F41.9 ANXIETY: ICD-10-CM

## 2019-10-23 DIAGNOSIS — E66.3 OVERWEIGHT: ICD-10-CM

## 2019-10-23 DIAGNOSIS — G89.4 CHRONIC PAIN SYNDROME: ICD-10-CM

## 2019-10-24 RX ORDER — FUROSEMIDE 20 MG/1
20 TABLET ORAL DAILY
Qty: 180 TABLET | Refills: 3 | Status: SHIPPED | OUTPATIENT
Start: 2019-10-24 | End: 2020-02-03 | Stop reason: SDUPTHER

## 2019-10-24 RX ORDER — LORAZEPAM 1 MG/1
1 TABLET ORAL 2 TIMES DAILY PRN
Qty: 60 TABLET | Refills: 0 | Status: SHIPPED | OUTPATIENT
Start: 2019-10-24 | End: 2019-12-20 | Stop reason: SDUPTHER

## 2019-10-24 RX ORDER — ACETAMINOPHEN AND CODEINE PHOSPHATE 300; 60 MG/1; MG/1
1 TABLET ORAL EVERY 6 HOURS PRN
Qty: 120 TABLET | Refills: 0 | Status: SHIPPED | OUTPATIENT
Start: 2019-10-24 | End: 2019-10-26 | Stop reason: SDUPTHER

## 2019-10-24 RX ORDER — IRBESARTAN 300 MG/1
300 TABLET ORAL NIGHTLY
Qty: 90 TABLET | Refills: 3 | Status: SHIPPED | OUTPATIENT
Start: 2019-10-24 | End: 2020-02-03 | Stop reason: SDUPTHER

## 2019-10-24 RX ORDER — SIMVASTATIN 20 MG
20 TABLET ORAL NIGHTLY
Qty: 90 TABLET | Refills: 3 | Status: SHIPPED | OUTPATIENT
Start: 2019-10-24 | End: 2020-02-03 | Stop reason: SDUPTHER

## 2019-10-24 RX ORDER — BUPROPION HYDROCHLORIDE 75 MG/1
TABLET ORAL
Qty: 360 TABLET | Refills: 3 | Status: SHIPPED | OUTPATIENT
Start: 2019-10-24 | End: 2019-10-26 | Stop reason: SDUPTHER

## 2019-10-26 DIAGNOSIS — G89.4 CHRONIC PAIN SYNDROME: ICD-10-CM

## 2019-10-26 DIAGNOSIS — E66.3 OVERWEIGHT: ICD-10-CM

## 2019-10-28 RX ORDER — ACETAMINOPHEN AND CODEINE PHOSPHATE 300; 60 MG/1; MG/1
1 TABLET ORAL EVERY 6 HOURS PRN
Qty: 120 TABLET | Refills: 0 | Status: SHIPPED | OUTPATIENT
Start: 2019-10-28 | End: 2019-11-19

## 2019-10-28 RX ORDER — BUPROPION HYDROCHLORIDE 75 MG/1
TABLET ORAL
Qty: 360 TABLET | Refills: 3 | Status: SHIPPED | OUTPATIENT
Start: 2019-10-28 | End: 2021-08-06

## 2019-11-19 ENCOUNTER — OFFICE VISIT (OUTPATIENT)
Dept: INTERNAL MEDICINE | Age: 64
End: 2019-11-19
Payer: COMMERCIAL

## 2019-11-19 VITALS
SYSTOLIC BLOOD PRESSURE: 189 MMHG | WEIGHT: 209 LBS | HEIGHT: 66 IN | RESPIRATION RATE: 18 BRPM | HEART RATE: 61 BPM | DIASTOLIC BLOOD PRESSURE: 99 MMHG | BODY MASS INDEX: 33.59 KG/M2 | OXYGEN SATURATION: 98 %

## 2019-11-19 DIAGNOSIS — F41.9 ANXIETY: ICD-10-CM

## 2019-11-19 DIAGNOSIS — I10 BENIGN ESSENTIAL HTN: ICD-10-CM

## 2019-11-19 DIAGNOSIS — G89.4 CHRONIC PAIN SYNDROME: Primary | ICD-10-CM

## 2019-11-19 DIAGNOSIS — E78.2 MIXED HYPERLIPIDEMIA: ICD-10-CM

## 2019-11-19 DIAGNOSIS — Z00.00 HEALTH CARE MAINTENANCE: ICD-10-CM

## 2019-11-19 PROCEDURE — 99396 PREV VISIT EST AGE 40-64: CPT | Performed by: NURSE PRACTITIONER

## 2019-11-19 RX ORDER — AMLODIPINE BESYLATE 10 MG/1
10 TABLET ORAL DAILY
Qty: 90 TABLET | Refills: 1 | Status: SHIPPED | OUTPATIENT
Start: 2019-11-19 | End: 2019-11-21 | Stop reason: SDUPTHER

## 2019-11-19 RX ORDER — HYDROCODONE BITATRATE AND ACETAMINOPHEN 5; 325 MG/1; MG/1
1 TABLET ORAL EVERY 8 HOURS PRN
Qty: 90 TABLET | Refills: 0 | Status: SHIPPED | OUTPATIENT
Start: 2019-11-19 | End: 2019-12-20 | Stop reason: SDUPTHER

## 2019-11-19 ASSESSMENT — ENCOUNTER SYMPTOMS
BLOOD IN STOOL: 0
CONSTIPATION: 0
NAUSEA: 0
COUGH: 0
ABDOMINAL DISTENTION: 0
STRIDOR: 0
CHOKING: 0
VOMITING: 0
TROUBLE SWALLOWING: 0
EYE ITCHING: 0
SHORTNESS OF BREATH: 0
SORE THROAT: 0
EYE DISCHARGE: 0
ABDOMINAL PAIN: 0
WHEEZING: 0
COLOR CHANGE: 0
DIARRHEA: 0

## 2019-11-21 RX ORDER — AMLODIPINE BESYLATE 10 MG/1
10 TABLET ORAL DAILY
Qty: 90 TABLET | Refills: 1 | Status: SHIPPED | OUTPATIENT
Start: 2019-11-21 | End: 2020-02-03 | Stop reason: SDUPTHER

## 2019-11-24 ENCOUNTER — OFFICE VISIT (OUTPATIENT)
Dept: URGENT CARE | Age: 64
End: 2019-11-24
Payer: COMMERCIAL

## 2019-11-24 ENCOUNTER — HOSPITAL ENCOUNTER (OUTPATIENT)
Dept: GENERAL RADIOLOGY | Age: 64
Discharge: HOME | End: 2019-11-24
Payer: COMMERCIAL

## 2019-11-24 VITALS
HEIGHT: 66 IN | SYSTOLIC BLOOD PRESSURE: 146 MMHG | BODY MASS INDEX: 33.59 KG/M2 | TEMPERATURE: 98.3 F | WEIGHT: 209 LBS | RESPIRATION RATE: 18 BRPM | OXYGEN SATURATION: 97 % | HEART RATE: 71 BPM | DIASTOLIC BLOOD PRESSURE: 82 MMHG
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
## 2019-11-24 DIAGNOSIS — M79.672 PAIN OF LEFT HEEL: ICD-10-CM

## 2019-11-24 DIAGNOSIS — M79.672 LEFT FOOT PAIN: ICD-10-CM

## 2019-11-24 DIAGNOSIS — S96.912A STRAIN OF LEFT ANKLE AND FOOT, INITIAL ENCOUNTER: Primary | ICD-10-CM

## 2019-11-24 PROCEDURE — 73630 X-RAY EXAM OF FOOT: CPT

## 2019-11-24 PROCEDURE — 99214 OFFICE O/P EST MOD 30 MIN: CPT | Performed by: SPECIALIST

## 2019-11-24 PROCEDURE — 73650 X-RAY EXAM OF HEEL: CPT

## 2019-12-20 DIAGNOSIS — F41.9 ANXIETY: ICD-10-CM

## 2019-12-20 DIAGNOSIS — G89.4 CHRONIC PAIN SYNDROME: ICD-10-CM

## 2019-12-23 RX ORDER — LORAZEPAM 1 MG/1
1 TABLET ORAL 2 TIMES DAILY PRN
Qty: 60 TABLET | Refills: 0 | Status: SHIPPED | OUTPATIENT
Start: 2019-12-23 | End: 2020-02-03 | Stop reason: SDUPTHER

## 2019-12-23 RX ORDER — HYDROCODONE BITATRATE AND ACETAMINOPHEN 5; 325 MG/1; MG/1
1 TABLET ORAL EVERY 8 HOURS PRN
Qty: 90 TABLET | Refills: 0 | Status: SHIPPED | OUTPATIENT
Start: 2019-12-23 | End: 2020-02-19

## 2020-02-03 RX ORDER — FUROSEMIDE 20 MG/1
20 TABLET ORAL DAILY
Qty: 180 TABLET | Refills: 3 | Status: SHIPPED | OUTPATIENT
Start: 2020-02-03 | End: 2020-07-13 | Stop reason: SDUPTHER

## 2020-02-03 RX ORDER — IRBESARTAN 300 MG/1
300 TABLET ORAL NIGHTLY
Qty: 90 TABLET | Refills: 3 | Status: SHIPPED | OUTPATIENT
Start: 2020-02-03 | End: 2020-07-13 | Stop reason: SDUPTHER

## 2020-02-03 RX ORDER — LORAZEPAM 1 MG/1
1 TABLET ORAL 2 TIMES DAILY PRN
Qty: 60 TABLET | Refills: 0 | Status: SHIPPED | OUTPATIENT
Start: 2020-02-03 | End: 2020-03-11 | Stop reason: SDUPTHER

## 2020-02-03 RX ORDER — SIMVASTATIN 20 MG
20 TABLET ORAL NIGHTLY
Qty: 90 TABLET | Refills: 3 | Status: SHIPPED | OUTPATIENT
Start: 2020-02-03 | End: 2020-06-04 | Stop reason: SDUPTHER

## 2020-02-03 RX ORDER — ACETAMINOPHEN AND CODEINE PHOSPHATE 60; 300 MG/1; MG/1
1 TABLET ORAL 4 TIMES DAILY PRN
Qty: 120 TABLET | Refills: 0 | Status: SHIPPED | OUTPATIENT
Start: 2020-02-03 | End: 2020-02-04 | Stop reason: SDUPTHER

## 2020-02-03 RX ORDER — AMLODIPINE BESYLATE 10 MG/1
10 TABLET ORAL DAILY
Qty: 90 TABLET | Refills: 1 | Status: SHIPPED | OUTPATIENT
Start: 2020-02-03 | End: 2020-07-13 | Stop reason: SDUPTHER

## 2020-02-04 RX ORDER — ACETAMINOPHEN AND CODEINE PHOSPHATE 60; 300 MG/1; MG/1
1 TABLET ORAL 4 TIMES DAILY PRN
Qty: 120 TABLET | Refills: 0 | Status: SHIPPED | OUTPATIENT
Start: 2020-02-04 | End: 2020-03-12 | Stop reason: SDUPTHER

## 2020-02-19 ENCOUNTER — OFFICE VISIT (OUTPATIENT)
Dept: INTERNAL MEDICINE | Age: 65
End: 2020-02-19
Payer: COMMERCIAL

## 2020-02-19 VITALS
OXYGEN SATURATION: 98 % | WEIGHT: 213 LBS | HEART RATE: 68 BPM | SYSTOLIC BLOOD PRESSURE: 165 MMHG | HEIGHT: 66 IN | BODY MASS INDEX: 34.23 KG/M2 | DIASTOLIC BLOOD PRESSURE: 88 MMHG

## 2020-02-19 PROCEDURE — 99213 OFFICE O/P EST LOW 20 MIN: CPT | Performed by: NURSE PRACTITIONER

## 2020-02-19 ASSESSMENT — PATIENT HEALTH QUESTIONNAIRE - PHQ9
2. FEELING DOWN, DEPRESSED OR HOPELESS: 0
SUM OF ALL RESPONSES TO PHQ9 QUESTIONS 1 & 2: 0
SUM OF ALL RESPONSES TO PHQ QUESTIONS 1-9: 0
SUM OF ALL RESPONSES TO PHQ QUESTIONS 1-9: 0
1. LITTLE INTEREST OR PLEASURE IN DOING THINGS: 0

## 2020-02-19 ASSESSMENT — ENCOUNTER SYMPTOMS
TROUBLE SWALLOWING: 0
VOMITING: 0
COUGH: 0
CONSTIPATION: 0
SHORTNESS OF BREATH: 0
EYE DISCHARGE: 0
ABDOMINAL PAIN: 0
BACK PAIN: 1
BLOOD IN STOOL: 0
CHOKING: 0
DIARRHEA: 0
STRIDOR: 0
NAUSEA: 0
EYE ITCHING: 0
COLOR CHANGE: 0
ABDOMINAL DISTENTION: 0
SORE THROAT: 0
WHEEZING: 0

## 2020-02-19 NOTE — PROGRESS NOTES
[facility] INTERNAL MEDICINE  [address] [facility] [address] [address]  Dept: [phone]  Dept Fax: [phone] 33: [phone]    Antonio Sevilla is a 62 y.o. male who presents today for his medical conditions/complaints as noted below. Antonio Sevilla is c/kait Chronic Pain (Patient is here for routine follow up visit. Patient states medications are working well.)        HPI:     HPI   1. HTN:  Stable on current meds; No side effects of the meds; Takes as directed; takes blood pressure 3-4 times a week   2. Anxiety; stable on current dose of lorazepam.  3.  Chronic pain syndrome; we had tried Norco for chronic pain of back and knee but he preferred to go back to Tylenol with codeine which is fine. 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 Chief Complaint   Patient presents with    Chronic Pain     Patient is here for routine follow up visit. Patient states medications are working well.        Past Medical History:   Diagnosis Date    Anxiety and depression     Atypical chest pain     Elevated lipids     Gastritis     GERD (gastroesophageal reflux disease)     Hepatitis A     Hiatal hernia     History of kidney stones     HTN (hypertension)     Hyperlipidemia     Hypertension     Irregular Z line of esophagus     Libido, decreased     Migraines       Past Surgical History:   Procedure Laterality Date    CHOLECYSTECTOMY      [facility]    COLONOSCOPY      COLONOSCOPY  10-3-06    Dr Kalyan Anderson    COLONOSCOPY  13-66-74    Dr Ina Justin ARTHROSCOPY Bilateral     KNEE SURGERY Bilateral     2 X'S ON LEFT ONCE ON THE RIGHT    [facility]    UPPER GASTROINTESTINAL ENDOSCOPY      UPPER GASTROINTESTINAL ENDOSCOPY  8-30-01 Dr Aureliano Mota  10-10-06    Dr Aureliano Mota  11-18-10    Dr James Bolden 2/19/2020 2/19/2020 11/24/2019 11/24/2019 11/19/2019 47/86/0198   SYSTOLIC 084 122 [phone]   DIASTOLIC 88 81 82 83 99 277   Site - Left Upper Arm - - Right Upper Arm Left Upper Arm   Pulse - 68 - 71 - 61   Temp - - - 98.3 - -   Resp - - - 18 - 18   SpO2 - 98 - 97 - 98   Weight - 213 lb - 209 lb - 209 lb   Height - 5' 6\" - 5' 6\" - 5' 6\"   BMI (wt*703/ht~2) - 34.38 kg/m2 - 33.73 kg/m2 - 33.73 kg/m2       Family History   Problem Relation Age of Onset    Heart Disease Father     Heart Disease Sister     Heart Disease Brother     Stomach Cancer Mother     Cancer Sister         hodgkins    Cancer Mother        Social History     Tobacco Use    Smoking status: Never Smoker    Smokeless tobacco: Never Used   Substance Use Topics    Alcohol use: Yes      Current Outpatient Medications   Medication Sig Dispense Refill    acetaminophen-codeine (TYLENOL #4) 300-60 MG per tablet Take 1 tablet by mouth 4 times daily as needed for Pain for up to 30 days. 120 tablet 0    simvastatin (ZOCOR) 20 MG tablet Take 1 tablet by mouth nightly 90 tablet 3    irbesartan (AVAPRO) 300 MG tablet Take 1 tablet by mouth nightly 90 tablet 3    furosemide (LASIX) 20 MG tablet Take 1 tablet by mouth daily 180 tablet 3    amLODIPine (NORVASC) 10 MG tablet Take 1 tablet by mouth daily 90 tablet 1    LORazepam (ATIVAN) 1 MG tablet Take 1 tablet by mouth 2 times daily as needed for Anxiety for up to 30 days.  60 tablet 0    buPROPion (WELLBUTRIN) 75 MG tablet Take 1 tablet twice daily for 3 days, then 2 in am and 1 pm for 3 days, than 2 tabs  tablet 3    meclizine (ANTIVERT) 25 MG tablet Take 1 tablet by mouth 3 times daily as needed for Dizziness 180 tablet 3    lidocaine viscous (XYLOCAINE) 2 % solution Take 15 mLs by mouth as needed for Irritation 100 mL 1    Lab Results   Component Value Date    LDLCALC 80 01/14/2019    LDLCALC 116 04/17/2018     Lab Results   Component Value Date     01/14/2019     04/08/2011    K 4.0 01/14/2019    K 4.2 04/08/2011     01/14/2019     04/08/2011    CO2 29 01/14/2019    BUN 16 01/14/2019    CREATININE 0.8 01/14/2019    CREATININE 1.0 04/08/2011    GLUCOSE 96 01/14/2019    CALCIUM 9.8 01/14/2019      Lab Results   Component Value Date    WBC 6.5 01/14/2019    HGB 16.4 01/14/2019    HCT 48.9 01/14/2019    .2 (H) 01/14/2019     01/14/2019    LABLYMP 2.15 02/11/2015    LYMPHOPCT 34.3 01/14/2019    RBC 4.83 01/14/2019    MCH 34.0 (H) 01/14/2019    MCHC 33.5 01/14/2019    RDW 13.1 01/14/2019     Lab Results   Component Value Date    VITD25 80.2 01/14/2019       Subjective:      Review of Systems   Constitutional: Negative for fatigue, fever and unexpected weight change. HENT: Negative for ear discharge, ear pain, mouth sores, sore throat and trouble swallowing. Eyes: Negative for discharge, itching and visual disturbance. Respiratory: Negative for cough, choking, shortness of breath, wheezing and stridor. Cardiovascular: Negative for chest pain, palpitations and leg swelling. Gastrointestinal: Negative for abdominal distention, abdominal pain, blood in stool, constipation, diarrhea, nausea and vomiting. Endocrine: Negative for cold intolerance, polydipsia and polyuria. Genitourinary: Negative for difficulty urinating, dysuria, frequency and urgency. Musculoskeletal: Positive for arthralgias and back pain. Negative for gait problem. Skin: Negative for color change and rash. Allergic/Immunologic: Negative for food allergies and immunocompromised state. Neurological: Negative for dizziness, tremors, syncope, speech difficulty, weakness and headaches. Hematological: Negative for adenopathy. Does not bruise/bleed easily.    Psychiatric/Behavioral: Negative for confusion and hallucinations. The patient is nervous/anxious. Objective:     Physical Exam  Constitutional:       General: He is not in acute distress. Appearance: He is well-developed. HENT:      Head: Normocephalic and atraumatic. Eyes:      General: No scleral icterus. Right eye: No discharge. Left eye: No discharge. Pupils: Pupils are equal, round, and reactive to light. Neck:      Musculoskeletal: Normal range of motion and neck supple. Thyroid: No thyromegaly. Vascular: No JVD. Cardiovascular:      Rate and Rhythm: Normal rate and regular rhythm. Heart sounds: Normal heart sounds. No murmur. Pulmonary:      Effort: Pulmonary effort is normal. No respiratory distress. Breath sounds: Normal breath sounds. No wheezing or rales. Abdominal:      General: Bowel sounds are normal. There is no distension. Palpations: Abdomen is soft. There is no mass. Tenderness: There is no abdominal tenderness. There is no guarding or rebound. Musculoskeletal: Normal range of motion. General: No tenderness. Skin:     General: Skin is warm and dry. Findings: No erythema or rash. Neurological:      Mental Status: He is alert and oriented to person, place, and time. Cranial Nerves: No cranial nerve deficit. Coordination: Coordination normal.      Deep Tendon Reflexes: Reflexes are normal and symmetric. Reflexes normal.   Psychiatric:         Mood and Affect: Mood is not depressed. Behavior: Behavior normal.         Thought Content: Thought content normal.         Judgment: Judgment normal.       BP (!) 165/88   Pulse 68   Ht 5' 6\" (1.676 m)   Wt 213 lb (96.6 kg)   SpO2 98%   BMI 34.38 kg/m²     Assessment:       Diagnosis Orders   1. Benign essential HTN  Urinalysis Reflex to Culture    Vitamin D 25 Hydroxy    TSH without Reflex   2. Mixed hyperlipidemia  Lipid Panel   3.  Chronic pain syndrome  CBC Auto Differential    Comprehensive Metabolic Panel   4. Encounter for prostate cancer screening  Psa screening   5. Encounter for screening for malignant neoplasm of colon  Cologuard (For External Results Only)       Plan:        Patient given educational materials - see patient instructions. Discussed use, benefit, and side effects of prescribed medications. Allpatient questions answered. Pt voiced understanding. Reviewed health maintenance. Instructed to continue current medications, diet and exercise. Patient agreed with treatment plan. Follow up as directed. MEDICATIONS:  No orders of the defined types were placed in this encounter. ORDERS:  Orders Placed This Encounter   Procedures    Cologuard (For External Results Only)    CBC Auto Differential    Comprehensive Metabolic Panel    Lipid Panel    Urinalysis Reflex to Culture    Vitamin D 25 Hydroxy    TSH without Reflex    Psa screening       Follow-up:  Return in about 3 months (around 5/19/2020) for have labs done prior to appt. PATIENT INSTRUCTIONS:  Patient Instructions   1. Hypertension stable no changes are necessary continue same meds  2. Anxiety controlled on current dose of lorazepam  3. Chronic pain syndrome he has had better luck with the Tylenol with codeine we will continue the same    Electronically signed by EMILIA Aparicio on 2/19/2020 at 8:50 AM    EMRDragon/transcription disclaimer:  Much of this encounter note is electronic transcription/translation of spoken language to printed texts. The electronic translation of spoken language may be erroneous, or at times,nonsensical words or phrases may be inadvertently transcribed.   Although I have reviewed the note for such errors, some may still exist.

## 2020-03-16 RX ORDER — ACETAMINOPHEN AND CODEINE PHOSPHATE 60; 300 MG/1; MG/1
1 TABLET ORAL 4 TIMES DAILY PRN
Qty: 120 TABLET | Refills: 0 | Status: SHIPPED | OUTPATIENT
Start: 2020-03-16 | End: 2020-04-28 | Stop reason: SDUPTHER

## 2020-03-16 RX ORDER — LORAZEPAM 1 MG/1
1 TABLET ORAL 2 TIMES DAILY PRN
Qty: 60 TABLET | Refills: 0 | Status: SHIPPED | OUTPATIENT
Start: 2020-03-16 | End: 2020-04-28 | Stop reason: SDUPTHER

## 2020-04-28 RX ORDER — ACETAMINOPHEN AND CODEINE PHOSPHATE 60; 300 MG/1; MG/1
1 TABLET ORAL 4 TIMES DAILY PRN
Qty: 120 TABLET | Refills: 0 | Status: SHIPPED | OUTPATIENT
Start: 2020-04-28 | End: 2020-06-04 | Stop reason: SDUPTHER

## 2020-04-28 RX ORDER — LORAZEPAM 1 MG/1
1 TABLET ORAL 2 TIMES DAILY PRN
Qty: 60 TABLET | Refills: 0 | OUTPATIENT
Start: 2020-04-28 | End: 2020-05-28

## 2020-04-28 RX ORDER — ACETAMINOPHEN AND CODEINE PHOSPHATE 60; 300 MG/1; MG/1
1 TABLET ORAL 4 TIMES DAILY PRN
Qty: 120 TABLET | Refills: 0 | OUTPATIENT
Start: 2020-04-28 | End: 2020-05-28

## 2020-04-28 RX ORDER — LORAZEPAM 1 MG/1
1 TABLET ORAL 2 TIMES DAILY PRN
Qty: 60 TABLET | Refills: 0 | Status: SHIPPED | OUTPATIENT
Start: 2020-04-28 | End: 2020-06-04 | Stop reason: SDUPTHER

## 2020-05-19 ENCOUNTER — OFFICE VISIT (OUTPATIENT)
Dept: INTERNAL MEDICINE | Age: 65
End: 2020-05-19
Payer: COMMERCIAL

## 2020-05-19 VITALS — DIASTOLIC BLOOD PRESSURE: 79 MMHG | SYSTOLIC BLOOD PRESSURE: 145 MMHG | OXYGEN SATURATION: 97 % | HEART RATE: 86 BPM

## 2020-05-19 PROCEDURE — 99214 OFFICE O/P EST MOD 30 MIN: CPT | Performed by: NURSE PRACTITIONER

## 2020-05-19 ASSESSMENT — ENCOUNTER SYMPTOMS
NAUSEA: 0
COUGH: 0
CONSTIPATION: 0
TROUBLE SWALLOWING: 0
SORE THROAT: 0
ABDOMINAL PAIN: 0
EYE ITCHING: 0
SHORTNESS OF BREATH: 0
STRIDOR: 0
COLOR CHANGE: 0
DIARRHEA: 0
WHEEZING: 0
EYE DISCHARGE: 0
ABDOMINAL DISTENTION: 0
CHOKING: 0
BLOOD IN STOOL: 0
VOMITING: 0

## 2020-05-19 NOTE — PROGRESS NOTES
[facility] INTERNAL MEDICINE  [address] [address]  [address] [address]  Dept: [phone]  Dept Fax: [phone]: [phone]    Yg Skaggs is a 62 y.o. male who presents today for his medical conditions/complaints as noted below. Yg Skaggs is c/kait Hypertension (Patient is here for yearly physical. Patient has not had recent labs.)        HPI:     HPI   1. HTN:  Stable on current meds; No side effects of the meds; Takes as directed; takes blood pressure 3-4 times a week   2. Hyperlipidemia-  Stable on current meds; Takes as directed; No side effects of the meds;  3. Anxiety   Lorazepam;  This is working well for him; he is using this for esophageal spasm   4. Chronic pain syndrome;  Stable on current dose of tylenol #4   Controlled Substance Monitoring:    Acute and Chronic Pain Monitoring:   RX Monitoring 5/19/2020   Attestation The Prescription Monitoring Report for this patient was reviewed today. Periodic Controlled Substance Monitoring Possible medication side effects, risk of tolerance/dependence & alternative treatments discussed. ;No signs of potential drug abuse or diversion identified. Chronic Pain > 50 MEDD Re-evaluated the status of the patient's underlying condition causing pain. 5. GERD;  Stable diet controlled; Chief Complaint   Patient presents with    Hypertension     Patient is here for yearly physical. Patient has not had recent labs.        Past Medical History:   Diagnosis Date    Anxiety and depression     Atypical chest pain     Elevated lipids     Gastritis     GERD (gastroesophageal reflux disease)     Hepatitis A     Hiatal hernia     History of kidney stones     HTN (hypertension)     Hyperlipidemia     Hypertension     Irregular Z line of esophagus     Libido, decreased     Migraines       Past Surgical History:   Procedure Laterality Date    CHOLECYSTECTOMY      COLONOSCOPY (NORVASC) 10 MG tablet Take 1 tablet by mouth daily 90 tablet 1    buPROPion (WELLBUTRIN) 75 MG tablet Take 1 tablet twice daily for 3 days, then 2 in am and 1 pm for 3 days, than 2 tabs  tablet 3    meclizine (ANTIVERT) 25 MG tablet Take 1 tablet by mouth 3 times daily as needed for Dizziness 180 tablet 3    lidocaine viscous (XYLOCAINE) 2 % solution Take 15 mLs by mouth as needed for Irritation 100 mL 1    cloNIDine (CATAPRES) 0.1 MG tablet Take 1 tablet by mouth as needed for High Blood Pressure 180 tablet 3     No current facility-administered medications for this visit. Allergies   Allergen Reactions    Nitroglycerin Other (See Comments)     Blood pressure problems, very bad, happened in [facility]. Blood pressure problems, very bad, happened in [facility]. Blood pressure problems, very bad, happened in [facility].     Nitroglycerin        Health Maintenance   Topic Date Due    HIV screen  06/05/1970    Colon cancer screen colonoscopy  06/05/2006    Lipid screen  01/14/2020    Potassium monitoring  01/14/2020    Creatinine monitoring  01/14/2020    Shingles Vaccine (1 of 2) 05/19/2021 (Originally 6/5/2006)    DTaP/Tdap/Td vaccine (2 - Td) 10/14/2029    Flu vaccine  Completed    Hepatitis C screen  Completed    Hepatitis A vaccine  Aged Out    Hepatitis B vaccine  Aged Out    Hib vaccine  Aged Out    Meningococcal (ACWY) vaccine  Aged Out    Pneumococcal 0-64 years Vaccine  Aged Out       No results found for: LABA1C  Lab Results   Component Value Date    PSA 1.71 01/14/2019     TSH   Date Value Ref Range Status   01/14/2019 2.560 0.270 - 4.200 uIU/mL Final   ]  Lab Results   Component Value Date     01/14/2019    K 4.0 01/14/2019     01/14/2019    CO2 29 01/14/2019    BUN 16 01/14/2019    CREATININE 0.8 01/14/2019    GLUCOSE 96 01/14/2019    CALCIUM 9.8 01/14/2019    PROT 7.9 01/14/2019    LABALBU 4.7 01/14/2019    BILITOT 0.6 01/14/2019    ALKPHOS 84 01/14/2019 Negative for gait problem. Skin: Negative for color change and rash. Allergic/Immunologic: Negative for food allergies and immunocompromised state. Neurological: Negative for dizziness, tremors, syncope, speech difficulty, weakness and headaches. Hematological: Negative for adenopathy. Does not bruise/bleed easily. Psychiatric/Behavioral: Negative for confusion and hallucinations. Objective:     Physical Exam  Constitutional:       General: He is not in acute distress. Appearance: He is well-developed. HENT:      Head: Normocephalic and atraumatic. Eyes:      General: No scleral icterus. Right eye: No discharge. Left eye: No discharge. Pupils: Pupils are equal, round, and reactive to light. Neck:      Musculoskeletal: Normal range of motion and neck supple. Thyroid: No thyromegaly. Vascular: No carotid bruit or JVD. Cardiovascular:      Rate and Rhythm: Normal rate and regular rhythm. Heart sounds: Normal heart sounds. No murmur. Pulmonary:      Effort: Pulmonary effort is normal. No respiratory distress. Breath sounds: Normal breath sounds. No wheezing or rales. Abdominal:      General: Bowel sounds are normal. There is no distension. Palpations: Abdomen is soft. There is no mass. Tenderness: There is no abdominal tenderness. There is no guarding or rebound. Musculoskeletal: Normal range of motion. General: No tenderness. Skin:     General: Skin is warm and dry. Findings: No erythema or rash. Neurological:      Mental Status: He is alert and oriented to person, place, and time. Cranial Nerves: No cranial nerve deficit. Coordination: Coordination normal.      Deep Tendon Reflexes: Reflexes are normal and symmetric. Reflexes normal.   Psychiatric:         Mood and Affect: Mood is not depressed. Behavior: Behavior normal.         Thought Content:  Thought content normal.         Judgment: Judgment

## 2020-06-04 RX ORDER — SIMVASTATIN 20 MG
20 TABLET ORAL NIGHTLY
Qty: 90 TABLET | Refills: 3 | Status: SHIPPED | OUTPATIENT
Start: 2020-06-04 | End: 2020-08-20 | Stop reason: SDUPTHER

## 2020-06-04 RX ORDER — ACETAMINOPHEN AND CODEINE PHOSPHATE 60; 300 MG/1; MG/1
1 TABLET ORAL 4 TIMES DAILY PRN
Qty: 120 TABLET | Refills: 0 | Status: SHIPPED | OUTPATIENT
Start: 2020-06-04 | End: 2020-07-13 | Stop reason: SDUPTHER

## 2020-06-04 RX ORDER — LORAZEPAM 1 MG/1
1 TABLET ORAL 2 TIMES DAILY PRN
Qty: 60 TABLET | Refills: 0 | Status: SHIPPED | OUTPATIENT
Start: 2020-06-04 | End: 2020-07-13 | Stop reason: SDUPTHER

## 2020-06-26 ENCOUNTER — TRANSCRIBE ORDERS (OUTPATIENT)
Dept: ADMINISTRATIVE | Facility: HOSPITAL | Age: 65
End: 2020-06-26

## 2020-06-26 DIAGNOSIS — Z01.818 PRE-OP TESTING: Primary | ICD-10-CM

## 2020-06-26 PROCEDURE — U0004 COV-19 TEST NON-CDC HGH THRU: HCPCS | Performed by: OBSTETRICS & GYNECOLOGY

## 2020-06-26 PROCEDURE — C9803 HOPD COVID-19 SPEC COLLECT: HCPCS | Performed by: OBSTETRICS & GYNECOLOGY

## 2020-06-29 LAB — SARS-COV-2 ORF1AB RESP QL NAA+PROBE: NOT DETECTED

## 2020-07-13 RX ORDER — AMLODIPINE BESYLATE 10 MG/1
10 TABLET ORAL DAILY
Qty: 90 TABLET | Refills: 1 | Status: SHIPPED | OUTPATIENT
Start: 2020-07-13 | End: 2020-10-26 | Stop reason: SDUPTHER

## 2020-07-13 RX ORDER — LORAZEPAM 1 MG/1
1 TABLET ORAL 2 TIMES DAILY PRN
Qty: 60 TABLET | Refills: 0 | Status: SHIPPED | OUTPATIENT
Start: 2020-07-13 | End: 2020-08-20 | Stop reason: SDUPTHER

## 2020-07-13 RX ORDER — FUROSEMIDE 20 MG/1
20 TABLET ORAL DAILY
Qty: 180 TABLET | Refills: 3 | Status: SHIPPED | OUTPATIENT
Start: 2020-07-13 | End: 2020-10-26 | Stop reason: SDUPTHER

## 2020-07-13 RX ORDER — IRBESARTAN 300 MG/1
300 TABLET ORAL NIGHTLY
Qty: 90 TABLET | Refills: 3 | Status: SHIPPED | OUTPATIENT
Start: 2020-07-13 | End: 2021-02-01 | Stop reason: SDUPTHER

## 2020-07-13 RX ORDER — ACETAMINOPHEN AND CODEINE PHOSPHATE 60; 300 MG/1; MG/1
1 TABLET ORAL 4 TIMES DAILY PRN
Qty: 120 TABLET | Refills: 0 | Status: SHIPPED | OUTPATIENT
Start: 2020-07-13 | End: 2020-08-20 | Stop reason: SDUPTHER

## 2020-07-13 NOTE — TELEPHONE ENCOUNTER
Jen called requesting a refill of the below medication which has been pended for you:     Requested Prescriptions     Pending Prescriptions Disp Refills    irbesartan (AVAPRO) 300 MG tablet 90 tablet 3     Sig: Take 1 tablet by mouth nightly    furosemide (LASIX) 20 MG tablet 180 tablet 3     Sig: Take 1 tablet by mouth daily    amLODIPine (NORVASC) 10 MG tablet 90 tablet 1     Sig: Take 1 tablet by mouth daily    acetaminophen-codeine (TYLENOL #4) 300-60 MG per tablet 120 tablet 0     Sig: Take 1 tablet by mouth 4 times daily as needed for Pain for up to 30 days.  LORazepam (ATIVAN) 1 MG tablet 60 tablet 0     Sig: Take 1 tablet by mouth 2 times daily as needed for Anxiety for up to 30 days. Last Appointment Date: 5/19/2020  Next Appointment Date: 8/19/2020    Allergies   Allergen Reactions    Nitroglycerin Other (See Comments)     Blood pressure problems, very bad, happened in [facility]. Blood pressure problems, very bad, happened in [facility]. Blood pressure problems, very bad, happened in [facility].     Nitroglycerin

## 2020-08-19 ENCOUNTER — OFFICE VISIT (OUTPATIENT)
Dept: INTERNAL MEDICINE | Age: 65
End: 2020-08-19
Payer: COMMERCIAL

## 2020-08-19 VITALS
HEIGHT: 66 IN | DIASTOLIC BLOOD PRESSURE: 78 MMHG | BODY MASS INDEX: 31.82 KG/M2 | HEART RATE: 82 BPM | WEIGHT: 198 LBS | SYSTOLIC BLOOD PRESSURE: 127 MMHG

## 2020-08-19 PROBLEM — K21.9 GASTROESOPHAGEAL REFLUX DISEASE WITHOUT ESOPHAGITIS: Status: RESOLVED | Noted: 2019-01-14 | Resolved: 2020-08-19

## 2020-08-19 LAB
ALBUMIN SERPL-MCNC: 4.3 G/DL (ref 3.5–5.2)
ALP BLD-CCNC: 80 U/L (ref 40–130)
ALT SERPL-CCNC: 31 U/L (ref 5–41)
ANION GAP SERPL CALCULATED.3IONS-SCNC: 15 MMOL/L (ref 7–19)
AST SERPL-CCNC: 28 U/L (ref 5–40)
BASOPHILS ABSOLUTE: 0 K/UL (ref 0–0.2)
BASOPHILS RELATIVE PERCENT: 0.4 % (ref 0–1)
BILIRUB SERPL-MCNC: 0.4 MG/DL (ref 0.2–1.2)
BILIRUBIN URINE: NEGATIVE
BLOOD, URINE: NEGATIVE
BUN BLDV-MCNC: 12 MG/DL (ref 8–23)
CALCIUM SERPL-MCNC: 9.3 MG/DL (ref 8.8–10.2)
CHLORIDE BLD-SCNC: 100 MMOL/L (ref 98–111)
CHOLESTEROL, TOTAL: 132 MG/DL (ref 160–199)
CLARITY: CLEAR
CO2: 28 MMOL/L (ref 22–29)
COLOR: YELLOW
CREAT SERPL-MCNC: 0.8 MG/DL (ref 0.5–1.2)
EOSINOPHILS ABSOLUTE: 0 K/UL (ref 0–0.6)
EOSINOPHILS RELATIVE PERCENT: 0.4 % (ref 0–5)
GFR AFRICAN AMERICAN: >59
GFR NON-AFRICAN AMERICAN: >60
GLUCOSE BLD-MCNC: 106 MG/DL (ref 74–109)
GLUCOSE URINE: NEGATIVE MG/DL
HCT VFR BLD CALC: 44.7 % (ref 42–52)
HDLC SERPL-MCNC: 40 MG/DL (ref 55–121)
HEMOGLOBIN: 15.4 G/DL (ref 14–18)
IMMATURE GRANULOCYTES #: 0 K/UL
KETONES, URINE: NEGATIVE MG/DL
LDL CHOLESTEROL CALCULATED: 63 MG/DL
LEUKOCYTE ESTERASE, URINE: NEGATIVE
LYMPHOCYTES ABSOLUTE: 1.7 K/UL (ref 1.1–4.5)
LYMPHOCYTES RELATIVE PERCENT: 32.6 % (ref 20–40)
MCH RBC QN AUTO: 33 PG (ref 27–31)
MCHC RBC AUTO-ENTMCNC: 34.5 G/DL (ref 33–37)
MCV RBC AUTO: 95.7 FL (ref 80–94)
MONOCYTES ABSOLUTE: 0.3 K/UL (ref 0–0.9)
MONOCYTES RELATIVE PERCENT: 5.4 % (ref 0–10)
NEUTROPHILS ABSOLUTE: 3.1 K/UL (ref 1.5–7.5)
NEUTROPHILS RELATIVE PERCENT: 60.6 % (ref 50–65)
NITRITE, URINE: NEGATIVE
PDW BLD-RTO: 13.2 % (ref 11.5–14.5)
PH UA: 5.5 (ref 5–8)
PLATELET # BLD: 230 K/UL (ref 130–400)
PMV BLD AUTO: 9.8 FL (ref 9.4–12.4)
POTASSIUM SERPL-SCNC: 3.1 MMOL/L (ref 3.5–5)
PROSTATE SPECIFIC ANTIGEN: 3.5 NG/ML (ref 0–4)
PROTEIN UA: NEGATIVE MG/DL
RBC # BLD: 4.67 M/UL (ref 4.7–6.1)
SODIUM BLD-SCNC: 143 MMOL/L (ref 136–145)
SPECIFIC GRAVITY UA: 1.01 (ref 1–1.03)
TOTAL PROTEIN: 7.3 G/DL (ref 6.6–8.7)
TRIGL SERPL-MCNC: 146 MG/DL (ref 0–149)
TSH SERPL DL<=0.05 MIU/L-ACNC: 1.59 UIU/ML (ref 0.27–4.2)
UROBILINOGEN, URINE: 0.2 E.U./DL
VITAMIN D 25-HYDROXY: 86.7 NG/ML
WBC # BLD: 5.2 K/UL (ref 4.8–10.8)

## 2020-08-19 PROCEDURE — 99214 OFFICE O/P EST MOD 30 MIN: CPT | Performed by: NURSE PRACTITIONER

## 2020-08-19 RX ORDER — POTASSIUM CHLORIDE 20 MEQ/1
20 TABLET, EXTENDED RELEASE ORAL DAILY
Qty: 90 TABLET | Refills: 1 | Status: SHIPPED | OUTPATIENT
Start: 2020-08-19 | End: 2020-08-20 | Stop reason: SDUPTHER

## 2020-08-19 ASSESSMENT — PATIENT HEALTH QUESTIONNAIRE - PHQ9
SUM OF ALL RESPONSES TO PHQ9 QUESTIONS 1 & 2: 0
1. LITTLE INTEREST OR PLEASURE IN DOING THINGS: 0
SUM OF ALL RESPONSES TO PHQ QUESTIONS 1-9: 0
SUM OF ALL RESPONSES TO PHQ QUESTIONS 1-9: 0
2. FEELING DOWN, DEPRESSED OR HOPELESS: 0

## 2020-08-19 ASSESSMENT — ENCOUNTER SYMPTOMS
ABDOMINAL PAIN: 0
WHEEZING: 0
CONSTIPATION: 0
SORE THROAT: 0
STRIDOR: 0
VOMITING: 0
COLOR CHANGE: 0
NAUSEA: 0
BLOOD IN STOOL: 0
SHORTNESS OF BREATH: 0
CHOKING: 0
TROUBLE SWALLOWING: 0
EYE ITCHING: 0
DIARRHEA: 0
COUGH: 0
EYE DISCHARGE: 0
ABDOMINAL DISTENTION: 0

## 2020-08-19 NOTE — PROGRESS NOTES
[address] [facility]  [address] [address] [place] [address]  [address] [address]  Dept: [phone]  Dept Fax: [phone]: [phone]    Maribel Ruffin is a 60 y.o. male who presents today for his medical conditions/complaints as noted below. Maribel Ruffin is c/kait Chronic Pain (Patient is here for routine follow up visit.)        HPI:     HPI   1. Chronic pain syndrome; tylenol #4 this helps with arthritis pain; he takes as directed no side effects of the meds  Controlled Substance Monitoring:    Acute and Chronic Pain Monitoring:   RX Monitoring 8/19/2020   Attestation The Prescription Monitoring Report for this patient was reviewed today. Periodic Controlled Substance Monitoring Possible medication side effects, risk of tolerance/dependence & alternative treatments discussed. ;No signs of potential drug abuse or diversion identified. Chronic Pain > 50 MEDD Obtained or confirmed \"Consent for Opioid Use\" on file. ;Re-evaluated the status of the patient's underlying condition causing pain. 2. Anxiety lorazepam BID this is actually the best medicine for anxiety he takes it routinely he has no side effects of the meds  3.  htn  10 mg amlodipein and avapro 300 at bedtime; And lasix 20 daiy is good control of his pressure 127/78 today he takes it about once a week at home  4. Hyperlipideima; Stable on zocor he has no signs of the effects of the meds he takes as directed we will get his labs today  5. Vertigo; Is helped by meclizine; this is much better than it has been in the past.  Chief Complaint   Patient presents with    Chronic Pain     Patient is here for routine follow up visit.        Past Medical History:   Diagnosis Date    Anxiety and depression     Atypical chest pain     Elevated lipids     Gastritis     GERD (gastroesophageal reflux disease)     Hepatitis A     Hiatal hernia     History of kidney stones     HTN (hypertension)     Hyperlipidemia     Hypertension     Irregular Z line of esophagus     Libido, decreased     Migraines       Past Surgical History:   Procedure Laterality Date    CHOLECYSTECTOMY      [facility]COPY  11-    [facility]    COLONOSCOPY      COLONOSCOPY  10-3-06    Dr Jaye Yoon    COLONOSCOPY  02-70-27    Dr Glenroy Ireland ARTHROSCOPY Bilateral     KNEE SURGERY Bilateral     2 X'S ON LEFT ONCE ON THE RIGHT    UPPER GASTROINTESTINAL ENDOSCOPY  11-     [facility]    UPPER GASTROINTESTINAL ENDOSCOPY      UPPER GASTROINTESTINAL ENDOSCOPY  8-30-01    Dr Kelly Elizondo  10-10-06    Dr Kelly Elizondo  11-18-10    Dr Mable Lindsay 8/19/2020 5/19/2020 2/19/2020 2/19/2020 11/24/2019 09/86/7051   SYSTOLIC 524 800 746 206 361 225   DIASTOLIC 78 79 88 81 82 83   Site - - - Left Upper Arm - -   Pulse 82 86 - 68 - 71   Temp - - - - - 98.3   Resp - - - - - 18   SpO2 - 97 - 98 - 97   Weight 198 lb - - 213 lb - 209 lb   Height 5' 6\" - - 5' 6\" - 5' 6\"   BMI (wt*703/ht~2) 31.95 kg/m2 - - 34.38 kg/m2 - 33.73 kg/m2       Family History   Problem Relation Age of Onset    Heart Disease Father     Heart Disease Sister     Heart Disease Brother     Stomach Cancer Mother     Cancer Sister         hodgkins    Cancer Mother        Social History     Tobacco Use    Smoking status: Never Smoker    Smokeless tobacco: Never Used   Substance Use Topics    Alcohol use: Yes      Current Outpatient Medications   Medication Sig Dispense Refill    irbesartan (AVAPRO) 300 MG tablet Take 1 tablet by mouth nightly 90 tablet 3    furosemide (LASIX) 20 MG tablet Take 1 tablet by mouth daily 180 tablet 3    amLODIPine (NORVASC) 10 MG tablet Take 1 tablet by mouth daily 90 tablet 1    acetaminophen-codeine (TYLENOL #4) 300-60 MG per tablet Take 1 tablet by mouth 4 times daily as needed for Pain for up to 30 days. 120 tablet 0    LORazepam (ATIVAN) 1 MG tablet Take 1 tablet by mouth 2 times daily as needed for Anxiety for up to 30 days. 60 tablet 0    simvastatin (ZOCOR) 20 MG tablet Take 1 tablet by mouth nightly 90 tablet 3    buPROPion (WELLBUTRIN) 75 MG tablet Take 1 tablet twice daily for 3 days, then 2 in am and 1 pm for 3 days, than 2 tabs  tablet 3    meclizine (ANTIVERT) 25 MG tablet Take 1 tablet by mouth 3 times daily as needed for Dizziness 180 tablet 3    lidocaine viscous (XYLOCAINE) 2 % solution Take 15 mLs by mouth as needed for Irritation 100 mL 1    cloNIDine (CATAPRES) 0.1 MG tablet Take 1 tablet by mouth as needed for High Blood Pressure 180 tablet 3     No current facility-administered medications for this visit. Allergies   Allergen Reactions    Nitroglycerin Other (See Comments)     Blood pressure problems, very bad, happened in [place] [facility]. Blood pressure problems, very bad, happened in [facility] [facility]. Blood pressure problems, very bad, happened in [place] [facility].     Nitroglycerin        Health Maintenance   Topic Date Due    HIV screen  06/05/1970    Colon cancer screen colonoscopy  06/05/2006    Lipid screen  01/14/2020    Potassium monitoring  01/14/2020    Creatinine monitoring  01/14/2020    Shingles Vaccine (1 of 2) 05/19/2021 (Originally 6/5/2006)    Flu vaccine (1) 09/01/2020    DTaP/Tdap/Td vaccine (2 - Td) 10/14/2029    Hepatitis C screen  Completed    Hepatitis A vaccine  Aged Out    Hepatitis B vaccine  Aged Out    Hib vaccine  Aged Out    Meningococcal (ACWY) vaccine  Aged Out    Pneumococcal 0-64 years Vaccine  Aged Out       No results found for: LABA1C  Lab Results   Component Value Date    PSA 1.71 01/14/2019     TSH   Date Value Ref Range Status   01/14/2019 2.560 0.270 - 4.200 uIU/mL Final   ]  Lab Results   Component Value Date     01/14/2019    K 4.0 01/14/2019     01/14/2019    CO2 29 nausea and vomiting. Endocrine: Negative for cold intolerance, polydipsia and polyuria. Genitourinary: Negative for difficulty urinating, dysuria, frequency and urgency. Musculoskeletal: Positive for arthralgias. Negative for gait problem. Skin: Negative for color change and rash. Allergic/Immunologic: Negative for food allergies and immunocompromised state. Neurological: Positive for dizziness. Negative for tremors, syncope, speech difficulty, weakness and headaches. Hematological: Negative for adenopathy. Does not bruise/bleed easily. Psychiatric/Behavioral: Negative for confusion and hallucinations. The patient is nervous/anxious. Objective:     Physical Exam  Constitutional:       General: He is not in acute distress. Appearance: He is well-developed. HENT:      Head: Normocephalic and atraumatic. Eyes:      General: No scleral icterus. Right eye: No discharge. Left eye: No discharge. Pupils: Pupils are equal, round, and reactive to light. Neck:      Musculoskeletal: Normal range of motion and neck supple. Thyroid: No thyromegaly. Vascular: No JVD. Cardiovascular:      Rate and Rhythm: Normal rate and regular rhythm. Heart sounds: Normal heart sounds. No murmur. Pulmonary:      Effort: Pulmonary effort is normal. No respiratory distress. Breath sounds: Normal breath sounds. No wheezing or rales. Abdominal:      General: Bowel sounds are normal. There is no distension. Palpations: Abdomen is soft. There is no mass. Tenderness: There is no abdominal tenderness. There is no guarding or rebound. Musculoskeletal: Normal range of motion. General: No tenderness. Skin:     General: Skin is warm and dry. Findings: No erythema or rash. Neurological:      Mental Status: He is alert and oriented to person, place, and time. Cranial Nerves: No cranial nerve deficit.       Coordination: Coordination normal.      Deep Tendon Reflexes: Reflexes are normal and symmetric. Reflexes normal.   Psychiatric:         Mood and Affect: Mood is not depressed. Behavior: Behavior normal.         Thought Content: Thought content normal.         Judgment: Judgment normal.       /78   Pulse 82   Ht 5' 6\" (1.676 m)   Wt 198 lb (89.8 kg)   BMI 31.96 kg/m²     Assessment:       Diagnosis Orders   1. Chronic pain syndrome     2. Anxiety     3. Benign essential HTN     4. Mixed hyperlipidemia     5. Vertigo       Labs reviewedfrom today   Plan:        Patient given educational materials - see patient instructions. Discussed use, benefit, and side effects of prescribed medications. Allpatient questions answered. Pt voiced understanding. Reviewed health maintenance. Instructed to continue current medications, diet and exercise. Patient agreed with treatment plan. Follow up as directed. MEDICATIONS:  No orders of the defined types were placed in this encounter. ORDERS:  No orders of the defined types were placed in this encounter. Follow-up:  Return in about 3 months (around 11/19/2020) for pain med office , no labs. PATIENT INSTRUCTIONS:  Patient Instructions   1. Chronic pain syndrome stable on Tylenol No. 4 no changes are necessary  2. Anxiety compliant with lorazepam twice daily no side effects continue same meds  3. Hypertension continue same meds no changes are necessary  4. Hyperlipidemia stable on Zocor no changes are necessary  5. Vertigo stable on meclizine no changes are necessary    The next 3-month visit will just be compliance for narcotic use. No labs are necessary. Electronically signed by EMILIA Gomez on 8/19/2020 at 10:01 AM    EMRDragon/transcription disclaimer:  Much of this encounter note is electronic transcription/translation of spoken language to printed texts.   The electronic translation of spoken language may be erroneous, or at times,nonsensical words or phrases may be inadvertently transcribed.   Although I have reviewed the note for such errors, some may still exist.

## 2020-08-20 RX ORDER — POTASSIUM CHLORIDE 20 MEQ/1
20 TABLET, EXTENDED RELEASE ORAL DAILY
Qty: 90 TABLET | Refills: 1 | Status: SHIPPED | OUTPATIENT
Start: 2020-08-20 | End: 2020-12-21 | Stop reason: SDUPTHER

## 2020-08-20 RX ORDER — SIMVASTATIN 20 MG
20 TABLET ORAL NIGHTLY
Qty: 90 TABLET | Refills: 3 | Status: SHIPPED | OUTPATIENT
Start: 2020-08-20 | End: 2020-10-26 | Stop reason: SDUPTHER

## 2020-08-20 RX ORDER — ACETAMINOPHEN AND CODEINE PHOSPHATE 60; 300 MG/1; MG/1
1 TABLET ORAL 4 TIMES DAILY PRN
Qty: 120 TABLET | Refills: 0 | Status: SHIPPED | OUTPATIENT
Start: 2020-08-20 | End: 2020-09-28 | Stop reason: SDUPTHER

## 2020-08-20 RX ORDER — LORAZEPAM 1 MG/1
1 TABLET ORAL 2 TIMES DAILY PRN
Qty: 60 TABLET | Refills: 0 | Status: SHIPPED | OUTPATIENT
Start: 2020-08-20 | End: 2020-09-28 | Stop reason: SDUPTHER

## 2020-08-22 ENCOUNTER — TELEPHONE (OUTPATIENT)
Age: 65
End: 2020-08-22

## 2020-08-22 ENCOUNTER — OFFICE VISIT (OUTPATIENT)
Age: 65
End: 2020-08-22

## 2020-08-22 VITALS — TEMPERATURE: 97 F | HEART RATE: 86 BPM | OXYGEN SATURATION: 97 %

## 2020-08-22 NOTE — PATIENT INSTRUCTIONS
Preventing the Spread of Coronavirus Disease 2019 in Homes and Residential Communities   For the most recent information go to [email or web address]    Prevention steps for People with confirmed or suspected COVID-19 (including persons under investigation) who do not need to be hospitalized  and   People with confirmed COVID-19 who were hospitalized and determined to be medically stable to go home    Your healthcare provider and public health staff will evaluate whether you can be cared for at home. If it is determined that you do not need to be hospitalized and can be isolated at home, you will be monitored by staff from your local or state health department. You should follow the prevention steps below until a healthcare provider or local or state health department says you can return to your normal activities. Stay home except to get medical care  People who are mildly ill with COVID-19 are able to isolate at home during their illness. You should restrict activities outside your home, except for getting medical care. Do not go to work, school, or public areas. Avoid using public transportation, ride-sharing, or taxis. Separate yourself from other people and animals in your home  People: As much as possible, you should stay in a specific room and away from other people in your home. Also, you should use a separate bathroom, if available. Animals: You should restrict contact with pets and other animals while you are sick with COVID-19, just like you would around other people. Although there have not been reports of pets or other animals becoming sick with COVID-19, it is still recommended that people sick with COVID-19 limit contact with animals until more information is known about the virus. When possible, have another member of your household care for your animals while you are sick.  If you are sick with COVID-19, avoid contact with your pet, including petting, snuggling, being kissed or licked, and sharing food. If you must care for your pet or be around animals while you are sick, wash your hands before and after you interact with pets and wear a facemask. Call ahead before visiting your doctor  If you have a medical appointment, call the healthcare provider and tell them that you have or may have COVID-19. This will help the healthcare providers office take steps to keep other people from getting infected or exposed. Wear a facemask  You should wear a facemask when you are around other people (e.g., sharing a room or vehicle) or pets and before you enter a healthcare providers office. If you are not able to wear a facemask (for example, because it causes trouble breathing), then people who live with you should not stay in the same room with you, or they should wear a facemask if they enter your room. Cover your coughs and sneezes  Cover your mouth and nose with a tissue when you cough or sneeze. Throw used tissues in a lined trash can. Immediately wash your hands with soap and water for at least 20 seconds or, if soap and water are not available, clean your hands with an alcohol-based hand  that contains at least 60% alcohol. Clean your hands often  Wash your hands often with soap and water for at least 20 seconds, especially after blowing your nose, coughing, or sneezing; going to the bathroom; and before eating or preparing food. If soap and water are not readily available, use an alcohol-based hand  with at least 60% alcohol, covering all surfaces of your hands and rubbing them together until they feel dry. Soap and water are the best option if hands are visibly dirty. Avoid touching your eyes, nose, and mouth with unwashed hands. Avoid sharing personal household items  You should not share dishes, drinking glasses, cups, eating utensils, towels, or bedding with other people or pets in your home.  After using these items, they should be washed thoroughly with soap and water. Clean all high-touch surfaces everyday  High touch surfaces include counters, tabletops, doorknobs, bathroom fixtures, toilets, phones, keyboards, tablets, and bedside tables. Also, clean any surfaces that may have blood, stool, or body fluids on them. Use a household cleaning spray or wipe, according to the label instructions. Labels contain instructions for safe and effective use of the cleaning product including precautions you should take when applying the product, such as wearing gloves and making sure you have good ventilation during use of the product. Monitor your symptoms  Seek prompt medical attention if your illness is worsening (e.g., difficulty breathing). Before seeking care, call your healthcare provider and tell them that you have, or are being evaluated for, COVID-19. Put on a facemask before you enter the facility. These steps will help the healthcare providers office to keep other people in the office or waiting room from getting infected or exposed. Ask your healthcare provider to call the local or [facility] health department. Persons who are placed under active monitoring or facilitated self-monitoring should follow instructions provided by their local health department or occupational health professionals, as appropriate. When working with your local health department check their available hours. If you have a medical emergency and need to call 911, notify the dispatch personnel that you have, or are being evaluated for COVID-19. If possible, put on a facemask before emergency medical services arrive. Discontinuing home isolation  Patients with confirmed COVID-19 should remain under home isolation precautions until the risk of secondary transmission to others is thought to be low.  The decision to discontinue home isolation precautions should be made on a case-by-case basis, in consultation with healthcare providers and state and [facility] health departments. [employer] allows you to send messages to your doctor, view your test results, renew your prescriptions, schedule appointments, view visit notes, and more. How Do I Sign Up? 1. In your Internet browser, go to [email or web address]  2. Click on the Sign Up Now link in the Sign In box. You will see the New Member Sign Up page. 3. Enter your [employer] Access Code exactly as it appears below. You will not need to use this code after youve completed the sign-up process. If you do not sign up before the expiration date, you must request a new code. [employer] Access Code: Activation code not generated  Current [employer] Status: Active    4. Enter your Social Security Number ([id number]) and Date of Birth (mm/dd/yyyy) as indicated and click Submit. You will be taken to the next sign-up page. 5. Create a [employer] ID. This will be your [employer] login ID and cannot be changed, so think of one that is secure and easy to remember. 6. Create a [employer] password. You can change your password at any time. 7. Enter your Password Reset Question and Answer. This can be used at a later time if you forget your password. 8. Enter your e-mail address. You will receive e-mail notification when new information is available in [address] 9. Click Sign Up. You can now view your medical record. Additional Information  If you have questions, please contact the physician practice where you receive care. Remember, [employer] is NOT to be used for urgent needs. For medical emergencies, dial 911. For questions regarding your [employer] account call [phone]. If you have a clinical question, please call your doctor's office.

## 2020-08-25 ENCOUNTER — TELEPHONE (OUTPATIENT)
Age: 65
End: 2020-08-25

## 2020-08-25 LAB — SARS-COV-2, NAA: DETECTED

## 2020-08-25 NOTE — TELEPHONE ENCOUNTER
Called pt. He verified name and . Notified pt of positive COVID-19. Advised 14 day quarantine. Advised on worsening signs and symptoms and when to go to ER. Advised health dept will also be contacting pt. Advised to return to clinic as needed. Pt verbalized understanding.

## 2020-09-17 RX ORDER — CIPROFLOXACIN 500 MG/1
500 TABLET, FILM COATED ORAL 2 TIMES DAILY
Qty: 14 TABLET | Refills: 0 | Status: SHIPPED | OUTPATIENT
Start: 2020-09-17 | End: 2020-09-24

## 2020-09-28 RX ORDER — LORAZEPAM 1 MG/1
1 TABLET ORAL 2 TIMES DAILY PRN
Qty: 60 TABLET | Refills: 0 | Status: SHIPPED | OUTPATIENT
Start: 2020-09-28 | End: 2020-10-26 | Stop reason: SDUPTHER

## 2020-09-28 RX ORDER — ACETAMINOPHEN AND CODEINE PHOSPHATE 60; 300 MG/1; MG/1
1 TABLET ORAL 4 TIMES DAILY PRN
Qty: 120 TABLET | Refills: 0 | Status: SHIPPED | OUTPATIENT
Start: 2020-09-28 | End: 2020-12-21 | Stop reason: SDUPTHER

## 2020-09-28 NOTE — TELEPHONE ENCOUNTER
Jen called requesting a refill of the below medication which has been pended for you:     Requested Prescriptions     Pending Prescriptions Disp Refills    acetaminophen-codeine (TYLENOL #4) 300-60 MG per tablet 120 tablet 0     Sig: Take 1 tablet by mouth 4 times daily as needed for Pain for up to 30 days.  LORazepam (ATIVAN) 1 MG tablet 60 tablet 0     Sig: Take 1 tablet by mouth 2 times daily as needed for Anxiety for up to 30 days. Last Appointment Date: 8/19/2020  Next Appointment Date: 11/19/2020    Allergies   Allergen Reactions    Nitroglycerin Other (See Comments)     Blood pressure problems, very bad, happened in [facility]. Blood pressure problems, very bad, happened in [facility]. Blood pressure problems, very bad, happened in [facility].     Nitroglycerin

## 2020-10-26 RX ORDER — AMLODIPINE BESYLATE 10 MG/1
10 TABLET ORAL DAILY
Qty: 90 TABLET | Refills: 1 | Status: SHIPPED | OUTPATIENT
Start: 2020-10-26 | End: 2021-06-15 | Stop reason: SDUPTHER

## 2020-10-26 RX ORDER — LORAZEPAM 1 MG/1
1 TABLET ORAL 2 TIMES DAILY PRN
Qty: 60 TABLET | Refills: 0 | Status: SHIPPED | OUTPATIENT
Start: 2020-10-26 | End: 2020-12-21 | Stop reason: SDUPTHER

## 2020-10-26 RX ORDER — FUROSEMIDE 20 MG/1
20 TABLET ORAL DAILY
Qty: 180 TABLET | Refills: 3 | Status: SHIPPED | OUTPATIENT
Start: 2020-10-26 | End: 2021-09-02 | Stop reason: SDUPTHER

## 2020-10-26 RX ORDER — SIMVASTATIN 20 MG
20 TABLET ORAL NIGHTLY
Qty: 90 TABLET | Refills: 3 | Status: SHIPPED | OUTPATIENT
Start: 2020-10-26 | End: 2021-05-05 | Stop reason: SDUPTHER

## 2020-10-26 NOTE — TELEPHONE ENCOUNTER
Jen called requesting a refill of the below medication which has been pended for you:     Requested Prescriptions     Pending Prescriptions Disp Refills    furosemide (LASIX) 20 MG tablet 180 tablet 3     Sig: Take 1 tablet by mouth daily    amLODIPine (NORVASC) 10 MG tablet 90 tablet 1     Sig: Take 1 tablet by mouth daily    simvastatin (ZOCOR) 20 MG tablet 90 tablet 3     Sig: Take 1 tablet by mouth nightly    LORazepam (ATIVAN) 1 MG tablet 60 tablet 0     Sig: Take 1 tablet by mouth 2 times daily as needed for Anxiety for up to 30 days. Last Appointment Date: 8/19/2020  Next Appointment Date: 11/19/2020    Allergies   Allergen Reactions    Nitroglycerin Other (See Comments)     Blood pressure problems, very bad, happened in [facility]. Blood pressure problems, very bad, happened in [facility]. Blood pressure problems, very bad, happened in [facility].     Nitroglycerin

## 2020-10-27 ENCOUNTER — NURSE ONLY (OUTPATIENT)
Dept: INTERNAL MEDICINE | Age: 65
End: 2020-10-27
Payer: COMMERCIAL

## 2020-10-27 DIAGNOSIS — E87.6 HYPOKALEMIA: ICD-10-CM

## 2020-10-27 LAB
ALBUMIN SERPL-MCNC: 4.6 G/DL (ref 3.5–5.2)
ALP BLD-CCNC: 92 U/L (ref 40–130)
ALT SERPL-CCNC: 37 U/L (ref 5–41)
ANION GAP SERPL CALCULATED.3IONS-SCNC: 8 MMOL/L (ref 7–19)
AST SERPL-CCNC: 23 U/L (ref 5–40)
BILIRUB SERPL-MCNC: 0.6 MG/DL (ref 0.2–1.2)
BUN BLDV-MCNC: 9 MG/DL (ref 8–23)
CALCIUM SERPL-MCNC: 9.4 MG/DL (ref 8.8–10.2)
CHLORIDE BLD-SCNC: 103 MMOL/L (ref 98–111)
CO2: 30 MMOL/L (ref 22–29)
CREAT SERPL-MCNC: 0.7 MG/DL (ref 0.5–1.2)
GFR AFRICAN AMERICAN: >59
GFR NON-AFRICAN AMERICAN: >60
GLUCOSE BLD-MCNC: 102 MG/DL (ref 74–109)
POTASSIUM SERPL-SCNC: 4.3 MMOL/L (ref 3.5–5)
SODIUM BLD-SCNC: 141 MMOL/L (ref 136–145)
TOTAL PROTEIN: 7.4 G/DL (ref 6.6–8.7)

## 2020-10-27 PROCEDURE — 90686 IIV4 VACC NO PRSV 0.5 ML IM: CPT | Performed by: NURSE PRACTITIONER

## 2020-10-27 PROCEDURE — 90471 IMMUNIZATION ADMIN: CPT | Performed by: NURSE PRACTITIONER

## 2020-11-19 ENCOUNTER — OFFICE VISIT (OUTPATIENT)
Dept: INTERNAL MEDICINE | Age: 65
End: 2020-11-19
Payer: COMMERCIAL

## 2020-11-19 VITALS
HEART RATE: 69 BPM | SYSTOLIC BLOOD PRESSURE: 131 MMHG | DIASTOLIC BLOOD PRESSURE: 83 MMHG | HEIGHT: 66 IN | BODY MASS INDEX: 33.27 KG/M2 | WEIGHT: 207 LBS

## 2020-11-19 PROBLEM — E87.6 HYPOKALEMIA: Status: ACTIVE | Noted: 2020-11-19

## 2020-11-19 PROCEDURE — 99213 OFFICE O/P EST LOW 20 MIN: CPT | Performed by: NURSE PRACTITIONER

## 2020-11-19 ASSESSMENT — ENCOUNTER SYMPTOMS
BLOOD IN STOOL: 0
WHEEZING: 0
EYE DISCHARGE: 0
ABDOMINAL DISTENTION: 0
SHORTNESS OF BREATH: 0
COLOR CHANGE: 0
VOMITING: 0
STRIDOR: 0
CONSTIPATION: 0
COUGH: 0
TROUBLE SWALLOWING: 0
DIARRHEA: 0
SORE THROAT: 0
ABDOMINAL PAIN: 0
EYE ITCHING: 0
NAUSEA: 0
CHOKING: 0

## 2020-11-19 NOTE — PROGRESS NOTES
[address] [facility]ICINE  [address] [facility] 13  [address] [address]  Dept: [phone]  Dept Fax: [phone]: [phone]    Noe Aldrich is a 60 y.o. male who presents today for his medical conditions/complaints as noted below. Noe Aldrich is c/kait Hypertension (Patient is here for routine follow up visit.)        HPI:     HPI   1. Hypertension; currently he is taking amlodipine 10 mg daily and Avapro 300 mg daily with Lasix 20 mg daily. Blood pressures under good control  2. Chronic pain syndrome currently he is taking Tylenol No. 4 which helps his joint pain. 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 3.  Hypokalemia we had had this when he is actually was actually ill and not taking a lot of his medicines and not having good p.o. intake we had repeated it and his potassium is 4.3 so he may not need potassium every day  4. Anxiety stable on current dose of lorazepam 1 mg twice a day as needed. Controlled Substance Monitoring:    Acute and Chronic Pain Monitoring:   RX Monitoring 11/19/2020   Attestation The Prescription Monitoring Report for this patient was reviewed today. Periodic Controlled Substance Monitoring Possible medication side effects, risk of tolerance/dependence & alternative treatments discussed. ;No signs of potential drug abuse or diversion identified. Chronic Pain > 50 MEDD -         Chief Complaint   Patient presents with    Hypertension     Patient is here for routine follow up visit.        Past Medical History:   Diagnosis Date    Anxiety and depression     Atypical chest pain     Elevated lipids     Gastritis     GERD (gastroesophageal reflux disease)     Hepatitis A     Hiatal hernia     History of kidney stones     HTN (hypertension)     Hyperlipidemia     Hypertension     Irregular Z line of esophagus     Libido, decreased     Migraines Past Surgical History:   Procedure Laterality Date    CHOLECYSTECTOMY      COL[facility]OSCOPY  11-    [facility]    COLONOSCOPY      COLONOSCOPY  10-3-06    Dr Bria Gutierrez    COLONOSCOPY  63-50-52    Dr Hill Del Cid ARTHROSCOPY Bilateral     KNEE SURGERY Bilateral     2 X'S ON LEFT ONCE ON THE RIGHT    UPPER GASTROINTESTINAL ENDOSCOPY  11-     [facility]    UPPER GASTROINTESTINAL ENDOSCOPY      UPPER GASTROINTESTINAL ENDOSCOPY  8-30-01    Dr Ralph Stafford  10-10-06    Dr Ralph Stafford  11-18-10    Dr Mellissa Lopez 11/19/2020 11/19/2020 8/22/2020 8/19/2020 5/19/2020 9/95/4399   SYSTOLIC 525 046 - [phone]   DIASTOLIC 83 83 - 78 79 88   Site - - - - - -   Pulse 69 65 86 82 86 -   Temp - - 97 - - -   Resp - - - - - -   SpO2 - - 97 - 97 -   Weight - 207 lb - 198 lb - -   Height - 5' 6\" - 5' 6\" - -   Body mass index - 33.41 kg/m2 - 31.95 kg/m2 - -       Family History   Problem Relation Age of Onset    Heart Disease Father     Heart Disease Sister     Heart Disease Brother     Stomach Cancer Mother     Cancer Sister         hodgkins    Cancer Mother        Social History     Tobacco Use    Smoking status: Never Smoker    Smokeless tobacco: Never Used   Substance Use Topics    Alcohol use: Yes      Current Outpatient Medications   Medication Sig Dispense Refill    furosemide (LASIX) 20 MG tablet Take 1 tablet by mouth daily 180 tablet 3    amLODIPine (NORVASC) 10 MG tablet Take 1 tablet by mouth daily 90 tablet 1    simvastatin (ZOCOR) 20 MG tablet Take 1 tablet by mouth nightly 90 tablet 3    LORazepam (ATIVAN) 1 MG tablet Take 1 tablet by mouth 2 times daily as needed for Anxiety for up to 30 days.  60 tablet 0    acetaminophen-codeine (TYLENOL #4) 300-60 MG per tablet Take 1 tablet by mouth 4 times daily as needed for Pain for up to 30 days. 120 tablet 0    potassium chloride (KLOR-CON M) 20 MEQ extended release tablet Take 1 tablet by mouth daily 90 tablet 1    irbesartan (AVAPRO) 300 MG tablet Take 1 tablet by mouth nightly 90 tablet 3    buPROPion (WELLBUTRIN) 75 MG tablet Take 1 tablet twice daily for 3 days, then 2 in am and 1 pm for 3 days, than 2 tabs  tablet 3    meclizine (ANTIVERT) 25 MG tablet Take 1 tablet by mouth 3 times daily as needed for Dizziness 180 tablet 3    lidocaine viscous (XYLOCAINE) 2 % solution Take 15 mLs by mouth as needed for Irritation 100 mL 1    cloNIDine (CATAPRES) 0.1 MG tablet Take 1 tablet by mouth as needed for High Blood Pressure 180 tablet 3     No current facility-administered medications for this visit. Allergies   Allergen Reactions    Nitroglycerin Other (See Comments)     Blood pressure problems, very bad, happened in [facility]. Blood pressure problems, very bad, happened in [facility]. Blood pressure problems, very bad, happened in [facility].     Nitroglycerin        Health Maintenance   Topic Date Due    HIV screen  06/05/1971    Diabetes screen  06/05/1996    Colon cancer screen colonoscopy  06/05/2006    Shingles Vaccine (1 of 2) 05/19/2021 (Originally 6/5/2006)    Lipid screen  08/19/2021    Potassium monitoring  10/27/2021    Creatinine monitoring  10/27/2021    DTaP/Tdap/Td vaccine (2 - Td) 10/14/2029    Flu vaccine  Completed    Hepatitis C screen  Completed    Hepatitis A vaccine  Aged Out    Hepatitis B vaccine  Aged Out    Hib vaccine  Aged Out    Meningococcal (ACWY) vaccine  Aged Out    Pneumococcal 0-64 years Vaccine  Aged Out       No results found for: LABA1C  Lab Results   Component Value Date    PSA 3.50 08/19/2020    PSA 1.71 01/14/2019     TSH   Date Value Ref Range Status   08/19/2020 1.590 0.270 - 4.200 uIU/mL Final   ]  Lab Results   Component Value Date     10/27/2020    K 4.3 10/27/2020     10/27/2020    CO2 30 Tendon Reflexes: Reflexes are normal and symmetric. Reflexes normal.   Psychiatric:         Mood and Affect: Mood is not depressed. Behavior: Behavior normal.         Thought Content: Thought content normal.         Judgment: Judgment normal.       /83   Pulse 69   Ht 5' 6\" (1.676 m)   Wt 207 lb (93.9 kg)   BMI 33.41 kg/m²     Assessment:       Diagnosis Orders   1. Benign essential HTN  CBC Auto Differential    Comprehensive Metabolic Panel    TSH without Reflex   2. Chronic pain syndrome     3. Hypokalemia     4. Healthcare maintenance  CBC Auto Differential    Comprehensive Metabolic Panel    Lipid Panel    TSH without Reflex    Vitamin D 25 Hydroxy   5. Mixed hyperlipidemia  Lipid Panel     Labs reviewed from aug and oct     Plan:        Patient given educational materials - see patient instructions. Discussed use, benefit, and side effects of prescribed medications. Allpatient questions answered. Pt voiced understanding. Reviewed health maintenance. Instructed to continue current medications, diet and exercise. Patient agreed with treatment plan. Follow up as directed. MEDICATIONS:  No orders of the defined types were placed in this encounter. ORDERS:  Orders Placed This Encounter   Procedures    CBC Auto Differential    Comprehensive Metabolic Panel    Lipid Panel    TSH without Reflex    Vitamin D 25 Hydroxy       Follow-up:  Return in about 3 months (around 2/19/2021). PATIENT INSTRUCTIONS:  Patient Instructions   1. Hypertension; You can take extra lasix if your b/p goes up   2. Chronic pain syndrome;  Stable no changes  3. Hypokalemia; Take potassium 3 days a week and drink OJ or banana ;    4. Anxiety; Stable on lorazepam;  No changes    Electronically signed by EMILIA Villanueva on 11/19/2020 at 9:51 AM    EMRDragon/transcription disclaimer:  Much of this encounter note is electronic transcription/translation of spoken language to printed texts.   The electronic translation of spoken language may be erroneous, or at times,nonsensical words or phrases may be inadvertently transcribed.   Although I have reviewed the note for such errors, some may still exist.

## 2020-11-19 NOTE — PATIENT INSTRUCTIONS
1. Hypertension; You can take extra lasix if your b/p goes up   2. Chronic pain syndrome;  Stable no changes  3. Hypokalemia; Take potassium 3 days a week and drink OJ or banana ;    4. Anxiety;   Stable on lorazepam;  No changes

## 2020-12-21 RX ORDER — ACETAMINOPHEN AND CODEINE PHOSPHATE 60; 300 MG/1; MG/1
1 TABLET ORAL 4 TIMES DAILY PRN
Qty: 120 TABLET | Refills: 0 | Status: SHIPPED | OUTPATIENT
Start: 2020-12-21 | End: 2021-05-05 | Stop reason: SDUPTHER

## 2020-12-21 RX ORDER — POTASSIUM CHLORIDE 20 MEQ/1
20 TABLET, EXTENDED RELEASE ORAL DAILY
Qty: 90 TABLET | Refills: 1 | Status: SHIPPED | OUTPATIENT
Start: 2020-12-21 | End: 2021-06-15 | Stop reason: SDUPTHER

## 2020-12-21 RX ORDER — LORAZEPAM 1 MG/1
1 TABLET ORAL 2 TIMES DAILY PRN
Qty: 60 TABLET | Refills: 0 | Status: SHIPPED | OUTPATIENT
Start: 2020-12-21 | End: 2021-02-01 | Stop reason: SDUPTHER

## 2021-01-01 ENCOUNTER — OFFICE VISIT (OUTPATIENT)
Dept: INTERNAL MEDICINE | Age: 66
End: 2021-01-01
Payer: MEDICARE

## 2021-01-01 VITALS
SYSTOLIC BLOOD PRESSURE: 122 MMHG | HEART RATE: 80 BPM | TEMPERATURE: 98.1 F | OXYGEN SATURATION: 99 % | BODY MASS INDEX: 33.75 KG/M2 | DIASTOLIC BLOOD PRESSURE: 68 MMHG | HEIGHT: 66 IN | WEIGHT: 210 LBS

## 2021-01-01 DIAGNOSIS — G89.29 CHRONIC PAIN OF LEFT KNEE: Primary | ICD-10-CM

## 2021-01-01 DIAGNOSIS — G62.9 PERIPHERAL POLYNEUROPATHY: ICD-10-CM

## 2021-01-01 DIAGNOSIS — F41.9 ANXIETY: ICD-10-CM

## 2021-01-01 DIAGNOSIS — M25.562 CHRONIC PAIN OF LEFT KNEE: Primary | ICD-10-CM

## 2021-01-01 DIAGNOSIS — E78.2 MIXED HYPERLIPIDEMIA: ICD-10-CM

## 2021-01-01 DIAGNOSIS — I10 BENIGN ESSENTIAL HTN: ICD-10-CM

## 2021-01-01 DIAGNOSIS — G89.4 CHRONIC PAIN SYNDROME: ICD-10-CM

## 2021-01-01 PROCEDURE — G0008 ADMIN INFLUENZA VIRUS VAC: HCPCS | Performed by: NURSE PRACTITIONER

## 2021-01-01 PROCEDURE — 4040F PNEUMOC VAC/ADMIN/RCVD: CPT | Performed by: NURSE PRACTITIONER

## 2021-01-01 PROCEDURE — G8417 CALC BMI ABV UP PARAM F/U: HCPCS | Performed by: NURSE PRACTITIONER

## 2021-01-01 PROCEDURE — 1123F ACP DISCUSS/DSCN MKR DOCD: CPT | Performed by: NURSE PRACTITIONER

## 2021-01-01 PROCEDURE — 90694 VACC AIIV4 NO PRSRV 0.5ML IM: CPT | Performed by: NURSE PRACTITIONER

## 2021-01-01 PROCEDURE — 3017F COLORECTAL CA SCREEN DOC REV: CPT | Performed by: NURSE PRACTITIONER

## 2021-01-01 PROCEDURE — G8427 DOCREV CUR MEDS BY ELIG CLIN: HCPCS | Performed by: NURSE PRACTITIONER

## 2021-01-01 PROCEDURE — 99214 OFFICE O/P EST MOD 30 MIN: CPT | Performed by: NURSE PRACTITIONER

## 2021-01-01 PROCEDURE — G8484 FLU IMMUNIZE NO ADMIN: HCPCS | Performed by: NURSE PRACTITIONER

## 2021-01-01 PROCEDURE — 1036F TOBACCO NON-USER: CPT | Performed by: NURSE PRACTITIONER

## 2021-01-01 RX ORDER — LORAZEPAM 1 MG/1
1 TABLET ORAL 2 TIMES DAILY
Qty: 60 TABLET | Refills: 2 | Status: SHIPPED | OUTPATIENT
Start: 2021-01-01 | End: 2022-01-01 | Stop reason: SDUPTHER

## 2021-01-01 RX ORDER — LORAZEPAM 1 MG/1
1 TABLET ORAL 2 TIMES DAILY
Qty: 60 TABLET | Refills: 2 | Status: SHIPPED | OUTPATIENT
Start: 2021-01-01 | End: 2021-01-01 | Stop reason: SDUPTHER

## 2021-01-01 RX ORDER — IRBESARTAN 300 MG/1
300 TABLET ORAL NIGHTLY
Qty: 90 TABLET | Refills: 3 | Status: SHIPPED | OUTPATIENT
Start: 2021-01-01 | End: 2022-01-01 | Stop reason: SDUPTHER

## 2021-01-01 RX ORDER — GABAPENTIN 300 MG/1
300 CAPSULE ORAL 3 TIMES DAILY
Qty: 270 CAPSULE | Refills: 1 | Status: SHIPPED | OUTPATIENT
Start: 2021-01-01 | End: 2022-01-01 | Stop reason: SDUPTHER

## 2021-01-01 ASSESSMENT — ENCOUNTER SYMPTOMS
BLOOD IN STOOL: 0
VOMITING: 0
EYE ITCHING: 0
SHORTNESS OF BREATH: 0
CHOKING: 0
EYE DISCHARGE: 0
WHEEZING: 0
STRIDOR: 0
NAUSEA: 0
COUGH: 0
TROUBLE SWALLOWING: 0
ABDOMINAL DISTENTION: 0
SORE THROAT: 0
ABDOMINAL PAIN: 0
COLOR CHANGE: 0
CONSTIPATION: 0
DIARRHEA: 0

## 2021-02-01 DIAGNOSIS — F41.9 ANXIETY: ICD-10-CM

## 2021-02-01 DIAGNOSIS — R42 VERTIGO: ICD-10-CM

## 2021-02-01 RX ORDER — MECLIZINE HYDROCHLORIDE 25 MG/1
25 TABLET ORAL 3 TIMES DAILY PRN
Qty: 180 TABLET | Refills: 3 | Status: SHIPPED | OUTPATIENT
Start: 2021-02-01 | End: 2021-02-25 | Stop reason: SDUPTHER

## 2021-02-01 RX ORDER — IRBESARTAN 300 MG/1
300 TABLET ORAL NIGHTLY
Qty: 90 TABLET | Refills: 3 | Status: SHIPPED | OUTPATIENT
Start: 2021-02-01 | End: 2021-06-15 | Stop reason: SDUPTHER

## 2021-02-01 RX ORDER — LORAZEPAM 1 MG/1
1 TABLET ORAL 2 TIMES DAILY PRN
Qty: 60 TABLET | Refills: 0 | Status: SHIPPED | OUTPATIENT
Start: 2021-02-01 | End: 2021-03-22 | Stop reason: SDUPTHER

## 2021-02-01 NOTE — TELEPHONE ENCOUNTER
Jen called requesting a refill of the below medication which has been pended for you:     Requested Prescriptions     Pending Prescriptions Disp Refills    meclizine (ANTIVERT) 25 MG tablet 180 tablet 3     Sig: Take 1 tablet by mouth 3 times daily as needed for Dizziness    irbesartan (AVAPRO) 300 MG tablet 90 tablet 3     Sig: Take 1 tablet by mouth nightly    LORazepam (ATIVAN) 1 MG tablet 60 tablet 0     Sig: Take 1 tablet by mouth 2 times daily as needed for Anxiety for up to 30 days. Last Appointment Date: 11/19/2020  Next Appointment Date: 2/23/2021    Allergies   Allergen Reactions    Nitroglycerin Other (See Comments)     Blood pressure problems, very bad, happened in [facility]. Blood pressure problems, very bad, happened in [facility]. Blood pressure problems, very bad, happened in [facility].     Nitroglycerin

## 2021-02-25 ENCOUNTER — OFFICE VISIT (OUTPATIENT)
Dept: INTERNAL MEDICINE | Age: 66
End: 2021-02-25

## 2021-02-25 VITALS
SYSTOLIC BLOOD PRESSURE: 126 MMHG | DIASTOLIC BLOOD PRESSURE: 76 MMHG | HEART RATE: 88 BPM | HEIGHT: 66 IN | BODY MASS INDEX: 33.11 KG/M2 | WEIGHT: 206 LBS

## 2021-02-25 DIAGNOSIS — I10 BENIGN ESSENTIAL HTN: ICD-10-CM

## 2021-02-25 DIAGNOSIS — G62.9 PERIPHERAL POLYNEUROPATHY: Primary | ICD-10-CM

## 2021-02-25 DIAGNOSIS — F41.9 ANXIETY: ICD-10-CM

## 2021-02-25 DIAGNOSIS — R42 VERTIGO: ICD-10-CM

## 2021-02-25 DIAGNOSIS — G89.4 CHRONIC PAIN SYNDROME: ICD-10-CM

## 2021-02-25 PROCEDURE — 99214 OFFICE O/P EST MOD 30 MIN: CPT | Performed by: NURSE PRACTITIONER

## 2021-02-25 RX ORDER — MECLIZINE HYDROCHLORIDE 25 MG/1
25 TABLET ORAL 3 TIMES DAILY PRN
Qty: 270 TABLET | Refills: 1 | Status: SHIPPED | OUTPATIENT
Start: 2021-02-25 | End: 2021-10-10 | Stop reason: SDUPTHER

## 2021-02-25 RX ORDER — GABAPENTIN 100 MG/1
100 CAPSULE ORAL 3 TIMES DAILY
Qty: 90 CAPSULE | Refills: 2 | Status: SHIPPED | OUTPATIENT
Start: 2021-02-25 | End: 2021-07-29

## 2021-02-25 ASSESSMENT — ENCOUNTER SYMPTOMS
WHEEZING: 0
EYE DISCHARGE: 0
COUGH: 0
BLOOD IN STOOL: 0
NAUSEA: 0
STRIDOR: 0
EYE ITCHING: 0
TROUBLE SWALLOWING: 0
SORE THROAT: 0
BACK PAIN: 1
ABDOMINAL PAIN: 0
CHOKING: 0
ABDOMINAL DISTENTION: 0
CONSTIPATION: 0
DIARRHEA: 0
SHORTNESS OF BREATH: 0
VOMITING: 0
COLOR CHANGE: 0

## 2021-02-25 ASSESSMENT — PATIENT HEALTH QUESTIONNAIRE - PHQ9
SUM OF ALL RESPONSES TO PHQ QUESTIONS 1-9: 0
2. FEELING DOWN, DEPRESSED OR HOPELESS: 0
SUM OF ALL RESPONSES TO PHQ9 QUESTIONS 1 & 2: 0

## 2021-02-25 NOTE — PROGRESS NOTES
Patient is here for routine follow up visit.  No labs done       Past Medical History:   Diagnosis Date    Anxiety and depression     Atypical chest pain     Elevated lipids     Gastritis     GERD (gastroesophageal reflux disease)     Hepatitis A     Hiatal hernia     History of kidney stones     HTN (hypertension)     Hyperlipidemia     Hypertension     Irregular Z line of esophagus     Libido, decreased     Migraines       Past Surgical History:   Procedure Laterality Date    CHOLECYSTECTOMY      [facility]  11-    [facility]    COLONOSCOPY      COLONOSCOPY  10-3-06    Dr Christos Camilo    COLONOSCOPY  27-42-63    Dr Reji Hernandez ARTHROSCOPY Bilateral     KNEE SURGERY Bilateral     2 X'S ON LEFT ONCE ON THE RIGHT    UPPER GASTROINTESTINAL ENDOSCOPY  11-     [facility]    UPPER GASTROINTESTINAL ENDOSCOPY      UPPER GASTROINTESTINAL ENDOSCOPY  8-30-01    Dr Kat Stevens  10-10-06    Dr Kat Stevens  11-18-10    Dr Angelina Gil 2/25/2021 11/19/2020 11/19/2020 8/22/2020 8/19/2020 4/30/5139   SYSTOLIC 481 600 787 - 218 226   DIASTOLIC 76 83 83 - 78 79   Site - - - - - -   Pulse 88 69 65 86 82 86   Temp - - - 97 - -   Resp - - - - - -   SpO2 - - - 97 - 97   Weight 206 lb - 207 lb - 198 lb -   Height 5' 6\" - 5' 6\" - 5' 6\" -   Body mass index 33.25 kg/m2 - 33.41 kg/m2 - 31.95 kg/m2 -       Family History   Problem Relation Age of Onset    Heart Disease Father     Heart Disease Sister     Heart Disease Brother     Stomach Cancer Mother     Cancer Sister         hodgkins    Cancer Mother        Social History     Tobacco Use    Smoking status: Never Smoker    Smokeless tobacco: Never Used   Substance Use Topics    Alcohol use: Yes      Current Outpatient Medications   Medication Sig Dispense Refill  meclizine (ANTIVERT) 25 MG tablet Take 1 tablet by mouth 3 times daily as needed for Dizziness 270 tablet 1    gabapentin (NEURONTIN) 100 MG capsule Take 1 capsule by mouth 3 times daily for 89 days. 90 capsule 2    irbesartan (AVAPRO) 300 MG tablet Take 1 tablet by mouth nightly 90 tablet 3    LORazepam (ATIVAN) 1 MG tablet Take 1 tablet by mouth 2 times daily as needed for Anxiety for up to 30 days. 60 tablet 0    potassium chloride (KLOR-CON M) 20 MEQ extended release tablet Take 1 tablet by mouth daily 90 tablet 1    acetaminophen-codeine (TYLENOL #4) 300-60 MG per tablet Take 1 tablet by mouth 4 times daily as needed for Pain for up to 30 days. 120 tablet 0    furosemide (LASIX) 20 MG tablet Take 1 tablet by mouth daily 180 tablet 3    amLODIPine (NORVASC) 10 MG tablet Take 1 tablet by mouth daily 90 tablet 1    simvastatin (ZOCOR) 20 MG tablet Take 1 tablet by mouth nightly 90 tablet 3    buPROPion (WELLBUTRIN) 75 MG tablet Take 1 tablet twice daily for 3 days, then 2 in am and 1 pm for 3 days, than 2 tabs  tablet 3    lidocaine viscous (XYLOCAINE) 2 % solution Take 15 mLs by mouth as needed for Irritation 100 mL 1    cloNIDine (CATAPRES) 0.1 MG tablet Take 1 tablet by mouth as needed for High Blood Pressure 180 tablet 3     No current facility-administered medications for this visit. Allergies   Allergen Reactions    Nitroglycerin Other (See Comments)     Blood pressure problems, very bad, happened in [facility]. Blood pressure problems, very bad, happened in [facility]. Blood pressure problems, very bad, happened in [facility].     Nitroglycerin        Health Maintenance   Topic Date Due    HIV screen  06/05/1971    Diabetes screen  06/05/1996    Colon cancer screen colonoscopy  06/05/2006    Shingles Vaccine (1 of 2) 05/19/2021 (Originally 6/5/2006)    Lipid screen  08/19/2021    Potassium monitoring  10/27/2021    Creatinine monitoring  10/27/2021 [facility] 34.5 08/19/2020    RDW 13.2 08/19/2020     Lab Results   Component Value Date    VITD25 86.7 08/19/2020       Subjective:      Review of Systems   Constitutional: Negative for fatigue, fever and unexpected weight change. HENT: Negative for ear discharge, ear pain, mouth sores, sore throat and trouble swallowing. Eyes: Negative for discharge, itching and visual disturbance. Respiratory: Negative for cough, choking, shortness of breath, wheezing and stridor. Cardiovascular: Negative for chest pain, palpitations and leg swelling. Gastrointestinal: Negative for abdominal distention, abdominal pain, blood in stool, constipation, diarrhea, nausea and vomiting. Endocrine: Negative for cold intolerance, polydipsia and polyuria. Genitourinary: Negative for difficulty urinating, dysuria, frequency and urgency. Musculoskeletal: Positive for back pain. Negative for arthralgias and gait problem. Skin: Negative for color change and rash. Allergic/Immunologic: Negative for food allergies and immunocompromised state. Neurological: Positive for dizziness. Negative for tremors, syncope, speech difficulty, weakness and headaches. Hematological: Negative for adenopathy. Does not bruise/bleed easily. Psychiatric/Behavioral: Negative for confusion and hallucinations. The patient is nervous/anxious. Objective:     Physical Exam  Constitutional:       General: He is not in acute distress. Appearance: He is well-developed. HENT:      Head: Normocephalic and atraumatic. Eyes:      General: No scleral icterus. Right eye: No discharge. Left eye: No discharge. Pupils: Pupils are equal, round, and reactive to light. Neck:      Musculoskeletal: Normal range of motion and neck supple. Thyroid: No thyromegaly. Vascular: No JVD. Cardiovascular:      Rate and Rhythm: Normal rate and regular rhythm. Heart sounds: Normal heart sounds. No murmur.    Pulmonary: Effort: Pulmonary effort is normal. No respiratory distress. Breath sounds: Normal breath sounds. No wheezing or rales. Abdominal:      General: Bowel sounds are normal. There is no distension. Palpations: Abdomen is soft. There is no mass. Tenderness: There is no abdominal tenderness. There is no guarding or rebound. Musculoskeletal: Normal range of motion. General: No tenderness. Skin:     General: Skin is warm and dry. Findings: No erythema or rash. Neurological:      Mental Status: He is alert and oriented to person, place, and time. Cranial Nerves: No cranial nerve deficit. Coordination: Coordination normal.      Deep Tendon Reflexes: Reflexes are normal and symmetric. Reflexes normal.   Psychiatric:         Mood and Affect: Mood is not depressed. Behavior: Behavior normal.         Thought Content: Thought content normal.         Judgment: Judgment normal.       /76   Pulse 88   Ht 5' 6\" (1.676 m)   Wt 206 lb (93.4 kg)   BMI 33.25 kg/m²     Assessment:       Diagnosis Orders   1. Peripheral polyneuropathy  gabapentin (NEURONTIN) 100 MG capsule   2. Vertigo  meclizine (ANTIVERT) 25 MG tablet   3. Anxiety     4. Chronic pain syndrome     5. Benign essential HTN       Labs reviewed from 8/20/2021    Plan:        Patient given educational materials - see patient instructions. Discussed use, benefit, and side effects of prescribed medications. Allpatient questions answered. Pt voiced understanding. Reviewed health maintenance. Instructed to continue current medications, diet and exercise. Patient agreed with treatment plan. Follow up as directed.    MEDICATIONS:  Orders Placed This Encounter   Medications    meclizine (ANTIVERT) 25 MG tablet     Sig: Take 1 tablet by mouth 3 times daily as needed for Dizziness     Dispense:  270 tablet     Refill:  1    gabapentin (NEURONTIN) 100 MG capsule Sig: Take 1 capsule by mouth 3 times daily for 89 days. Dispense:  90 capsule     Refill:  2         ORDERS:  No orders of the defined types were placed in this encounter. Follow-up:  No follow-ups on file. PATIENT INSTRUCTIONS:  Patient Instructions   1. Peripheral neuropathy we will start gabapentin 100 mg she can take it up to 3 times a day if you see that you need to take 2 or 3 to get rid of the pain just let me know we got enough to count of decide what dosing we need to do. Just, let me know if you need refills on  2. Vertigo refill of meclizine  3. Anxiety stable on lorazepam no changes are necessary  4. Chronic pain syndrome stable on Tylenol 3 no changes are necessary  5 hypertension blood pressure is good today no changes are necessary    Electronically signed by EMILIA Uribe on 2/25/2021 at 10:25 AM        EMRDragon/transcription disclaimer:  Much of this encounter note is electronic transcription/translation of spoken language to printed texts. The electronic translation of spoken language may be erroneous, or at times,nonsensical words or phrases may be inadvertently transcribed.   Although I have reviewed the note for such errors, some may still exist.

## 2021-02-25 NOTE — PATIENT INSTRUCTIONS
1. Peripheral neuropathy we will start gabapentin 100 mg she can take it up to 3 times a day if you see that you need to take 2 or 3 to get rid of the pain just let me know we got enough to count of decide what dosing we need to do. Just, let me know if you need refills on  2. Vertigo refill of meclizine  3. Anxiety stable on lorazepam no changes are necessary  4.   Chronic pain syndrome stable on Tylenol 3 no changes are necessary  5 hypertension blood pressure is good today no changes are necessary

## 2021-03-22 DIAGNOSIS — F41.9 ANXIETY: ICD-10-CM

## 2021-03-23 RX ORDER — LORAZEPAM 1 MG/1
1 TABLET ORAL 2 TIMES DAILY PRN
Qty: 60 TABLET | Refills: 0 | Status: SHIPPED | OUTPATIENT
Start: 2021-03-23 | End: 2021-05-05 | Stop reason: SDUPTHER

## 2021-03-23 NOTE — TELEPHONE ENCOUNTER
Jorge Gutierrez called to request a refill on his medication. Last office visit : 2/25/2021   Next office visit : 5/26/2021     Last UDS: No results found for: Ros Fus, LABBENZ, BUPRENUR, COCAIMETSCRU, GABAPENTIN, MDMA, METAMPU, OPIATESCREENURINE, OXTCOSU, PHENCYCLIDINESCREENURINE, PROPOXYPHENE, THCSCREENUR, Maday Frame 2/19/2021  Drug Contract 11/19/19  UDS 5/14/19      Requested Prescriptions     Pending Prescriptions Disp Refills    LORazepam (ATIVAN) 1 MG tablet 60 tablet 0     Sig: Take 1 tablet by mouth 2 times daily as needed for Anxiety for up to 30 days. Please approve or refuse this medication.    Gamaliel Yang

## 2021-05-05 DIAGNOSIS — F41.9 ANXIETY: ICD-10-CM

## 2021-05-05 DIAGNOSIS — G89.4 CHRONIC PAIN SYNDROME: ICD-10-CM

## 2021-05-06 RX ORDER — ACETAMINOPHEN AND CODEINE PHOSPHATE 60; 300 MG/1; MG/1
1 TABLET ORAL 4 TIMES DAILY PRN
Qty: 120 TABLET | Refills: 0 | Status: SHIPPED | OUTPATIENT
Start: 2021-05-06 | End: 2021-06-15 | Stop reason: SDUPTHER

## 2021-05-06 RX ORDER — LORAZEPAM 1 MG/1
1 TABLET ORAL 2 TIMES DAILY PRN
Qty: 60 TABLET | Refills: 0 | Status: SHIPPED | OUTPATIENT
Start: 2021-05-06 | End: 2021-06-15 | Stop reason: SDUPTHER

## 2021-05-06 RX ORDER — SIMVASTATIN 20 MG
20 TABLET ORAL NIGHTLY
Qty: 90 TABLET | Refills: 3 | Status: SHIPPED | OUTPATIENT
Start: 2021-05-06 | End: 2021-10-10 | Stop reason: SDUPTHER

## 2021-05-06 NOTE — TELEPHONE ENCOUNTER
Celio Mckoy called to request a refill on his medication. Last office visit : 2/25/2021   Next office visit : 5/26/2021     Last UDS: No results found for: [place], LABBENZ, BUPRENUR, COCAIMETSCRU, GABAPENTIN, MDMA, METAMPU, OPIATESCREENURINE, OXTCOSU, PHENCYCLIDINESCREENURINE, PROPOXYPHENE, THCSCREENUR, TRICYUR    Last Joselito: 02/19/2021  Medication Contract: 11/19/2019    Requested Prescriptions     Pending Prescriptions Disp Refills    simvastatin (ZOCOR) 20 MG tablet 90 tablet 3     Sig: Take 1 tablet by mouth nightly    acetaminophen-codeine (TYLENOL #4) 300-60 MG per tablet 120 tablet 0     Sig: Take 1 tablet by mouth 4 times daily as needed for Pain for up to 30 days.  LORazepam (ATIVAN) 1 MG tablet 60 tablet 0     Sig: Take 1 tablet by mouth 2 times daily as needed for Anxiety for up to 30 days. Please approve or refuse this medication.    Miguelina Rader

## 2021-06-15 DIAGNOSIS — F41.9 ANXIETY: ICD-10-CM

## 2021-06-15 DIAGNOSIS — G89.4 CHRONIC PAIN SYNDROME: ICD-10-CM

## 2021-06-15 RX ORDER — IRBESARTAN 300 MG/1
300 TABLET ORAL NIGHTLY
Qty: 90 TABLET | Refills: 3 | Status: SHIPPED | OUTPATIENT
Start: 2021-06-15 | End: 2021-01-01 | Stop reason: SDUPTHER

## 2021-06-15 RX ORDER — AMLODIPINE BESYLATE 10 MG/1
10 TABLET ORAL DAILY
Qty: 90 TABLET | Refills: 1 | Status: SHIPPED | OUTPATIENT
Start: 2021-06-15 | End: 2021-09-02 | Stop reason: SDUPTHER

## 2021-06-15 RX ORDER — LORAZEPAM 1 MG/1
1 TABLET ORAL 2 TIMES DAILY PRN
Qty: 60 TABLET | Refills: 0 | Status: SHIPPED | OUTPATIENT
Start: 2021-06-15 | End: 2021-07-27

## 2021-06-15 RX ORDER — ACETAMINOPHEN AND CODEINE PHOSPHATE 60; 300 MG/1; MG/1
1 TABLET ORAL 4 TIMES DAILY PRN
Qty: 120 TABLET | Refills: 0 | Status: ON HOLD
Start: 2021-06-15 | End: 2021-08-09 | Stop reason: HOSPADM

## 2021-06-15 RX ORDER — POTASSIUM CHLORIDE 20 MEQ/1
20 TABLET, EXTENDED RELEASE ORAL DAILY
Qty: 90 TABLET | Refills: 1 | Status: SHIPPED | OUTPATIENT
Start: 2021-06-15 | End: 2021-08-06

## 2021-06-15 NOTE — TELEPHONE ENCOUNTER
Pt has not been seen since 2/25/21, no showed last appt. I attempted to call to get appt scheduled, pt vm box is full.

## 2021-07-26 DIAGNOSIS — F41.9 ANXIETY: ICD-10-CM

## 2021-07-26 RX ORDER — SIMVASTATIN 20 MG
20 TABLET ORAL NIGHTLY
Qty: 90 TABLET | Refills: 3 | OUTPATIENT
Start: 2021-07-26

## 2021-07-26 RX ORDER — LORAZEPAM 1 MG/1
1 TABLET ORAL 2 TIMES DAILY PRN
Qty: 60 TABLET | Refills: 0 | OUTPATIENT
Start: 2021-07-26 | End: 2021-08-25

## 2021-07-27 DIAGNOSIS — F41.9 ANXIETY: ICD-10-CM

## 2021-07-27 RX ORDER — LORAZEPAM 1 MG/1
TABLET ORAL
Qty: 60 TABLET | Refills: 0 | Status: SHIPPED | OUTPATIENT
Start: 2021-07-27 | End: 2021-07-29

## 2021-07-27 NOTE — TELEPHONE ENCOUNTER
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

## 2021-07-27 NOTE — TELEPHONE ENCOUNTER
Jen called requesting a refill of the below medication which has been pended for you:     Requested Prescriptions     Pending Prescriptions Disp Refills    LORazepam (ATIVAN) 1 MG tablet [Pharmacy Med Name: LORazepam 1 MG TABLET] 60 tablet 0     Sig: TAKE ONE TABLET BY MOUTH TWICE A DAY AS NEEDED FOR ANXIETY ** MUST LAST 30 DAYS**       Last Appointment Date: 2/25/2021  Next Appointment Date: Visit date not found    Allergies   Allergen Reactions    Nitroglycerin Other (See Comments)     Blood pressure problems, very bad, happened in [facility]. Blood pressure problems, very bad, happened in [facility]. Blood pressure problems, very bad, happened in [facility].     Nitroglycerin

## 2021-07-28 DIAGNOSIS — I10 BENIGN ESSENTIAL HTN: ICD-10-CM

## 2021-07-28 DIAGNOSIS — Z00.00 HEALTHCARE MAINTENANCE: ICD-10-CM

## 2021-07-28 DIAGNOSIS — E78.2 MIXED HYPERLIPIDEMIA: ICD-10-CM

## 2021-07-28 LAB
ALBUMIN SERPL-MCNC: 4.3 G/DL (ref 3.5–5.2)
ALP BLD-CCNC: 87 U/L (ref 40–130)
ALT SERPL-CCNC: 36 U/L (ref 5–41)
ANION GAP SERPL CALCULATED.3IONS-SCNC: 10 MMOL/L (ref 7–19)
AST SERPL-CCNC: 29 U/L (ref 5–40)
BASOPHILS ABSOLUTE: 0.1 K/UL (ref 0–0.2)
BASOPHILS RELATIVE PERCENT: 0.7 % (ref 0–1)
BILIRUB SERPL-MCNC: 0.6 MG/DL (ref 0.2–1.2)
BUN BLDV-MCNC: 10 MG/DL (ref 8–23)
CALCIUM SERPL-MCNC: 9.5 MG/DL (ref 8.8–10.2)
CHLORIDE BLD-SCNC: 105 MMOL/L (ref 98–111)
CHOLESTEROL, TOTAL: 187 MG/DL (ref 160–199)
CO2: 30 MMOL/L (ref 22–29)
CREAT SERPL-MCNC: 0.8 MG/DL (ref 0.5–1.2)
EOSINOPHILS ABSOLUTE: 0.1 K/UL (ref 0–0.6)
EOSINOPHILS RELATIVE PERCENT: 1.8 % (ref 0–5)
GFR AFRICAN AMERICAN: >59
GFR NON-AFRICAN AMERICAN: >60
GLUCOSE BLD-MCNC: 92 MG/DL (ref 74–109)
HCT VFR BLD CALC: 45.3 % (ref 42–52)
HDLC SERPL-MCNC: 72 MG/DL (ref 55–121)
HEMOGLOBIN: 15.6 G/DL (ref 14–18)
IMMATURE GRANULOCYTES #: 0.1 K/UL
LDL CHOLESTEROL CALCULATED: 47 MG/DL
LYMPHOCYTES ABSOLUTE: 2.8 K/UL (ref 1.1–4.5)
LYMPHOCYTES RELATIVE PERCENT: 39.6 % (ref 20–40)
MCH RBC QN AUTO: 34.7 PG (ref 27–31)
MCHC RBC AUTO-ENTMCNC: 34.4 G/DL (ref 33–37)
MCV RBC AUTO: 100.7 FL (ref 80–94)
MONOCYTES ABSOLUTE: 0.5 K/UL (ref 0–0.9)
MONOCYTES RELATIVE PERCENT: 7.4 % (ref 0–10)
NEUTROPHILS ABSOLUTE: 3.5 K/UL (ref 1.5–7.5)
NEUTROPHILS RELATIVE PERCENT: 49.6 % (ref 50–65)
PDW BLD-RTO: 13.5 % (ref 11.5–14.5)
PLATELET # BLD: 249 K/UL (ref 130–400)
PMV BLD AUTO: 10.1 FL (ref 9.4–12.4)
POTASSIUM SERPL-SCNC: 3.8 MMOL/L (ref 3.5–5)
RBC # BLD: 4.5 M/UL (ref 4.7–6.1)
SODIUM BLD-SCNC: 145 MMOL/L (ref 136–145)
TOTAL PROTEIN: 7.1 G/DL (ref 6.6–8.7)
TRIGL SERPL-MCNC: 340 MG/DL (ref 0–149)
TSH SERPL DL<=0.05 MIU/L-ACNC: 1.43 UIU/ML (ref 0.27–4.2)
VITAMIN D 25-HYDROXY: 83.1 NG/ML
WBC # BLD: 7.1 K/UL (ref 4.8–10.8)

## 2021-07-29 ENCOUNTER — OFFICE VISIT (OUTPATIENT)
Dept: INTERNAL MEDICINE | Age: 66
End: 2021-07-29
Payer: MEDICARE

## 2021-07-29 VITALS
SYSTOLIC BLOOD PRESSURE: 132 MMHG | DIASTOLIC BLOOD PRESSURE: 72 MMHG | WEIGHT: 213 LBS | HEIGHT: 66 IN | OXYGEN SATURATION: 98 % | HEART RATE: 86 BPM | BODY MASS INDEX: 34.23 KG/M2

## 2021-07-29 DIAGNOSIS — Z00.00 WELCOME TO MEDICARE PREVENTIVE VISIT: ICD-10-CM

## 2021-07-29 DIAGNOSIS — G89.4 CHRONIC PAIN SYNDROME: ICD-10-CM

## 2021-07-29 DIAGNOSIS — K43.9 VENTRAL HERNIA WITHOUT OBSTRUCTION OR GANGRENE: ICD-10-CM

## 2021-07-29 DIAGNOSIS — R41.3 MEMORY LOSS: ICD-10-CM

## 2021-07-29 DIAGNOSIS — G62.9 PERIPHERAL POLYNEUROPATHY: ICD-10-CM

## 2021-07-29 DIAGNOSIS — E78.2 MIXED HYPERLIPIDEMIA: ICD-10-CM

## 2021-07-29 DIAGNOSIS — F41.9 ANXIETY: ICD-10-CM

## 2021-07-29 DIAGNOSIS — Z00.00 ROUTINE GENERAL MEDICAL EXAMINATION AT A HEALTH CARE FACILITY: ICD-10-CM

## 2021-07-29 DIAGNOSIS — E55.9 VITAMIN D DEFICIENCY: ICD-10-CM

## 2021-07-29 DIAGNOSIS — I10 BENIGN ESSENTIAL HTN: ICD-10-CM

## 2021-07-29 DIAGNOSIS — Z12.11 ENCOUNTER FOR SCREENING FOR MALIGNANT NEOPLASM OF COLON: ICD-10-CM

## 2021-07-29 DIAGNOSIS — Z79.899 LONG-TERM USE OF HIGH-RISK MEDICATION: Primary | ICD-10-CM

## 2021-07-29 LAB
ALCOHOL URINE: NORMAL
AMPHETAMINE SCREEN, URINE: NORMAL
BARBITURATE SCREEN, URINE: NORMAL
BENZODIAZEPINE SCREEN, URINE: NORMAL
BUPRENORPHINE URINE: NORMAL
COCAINE METABOLITE SCREEN URINE: NORMAL
FENTANYL SCREEN, URINE: NORMAL
GABAPENTIN SCREEN, URINE: NORMAL
MDMA URINE: NORMAL
METHADONE SCREEN, URINE: NORMAL
METHAMPHETAMINE, URINE: NORMAL
OPIATE SCREEN URINE: NORMAL
OXYCODONE SCREEN URINE: NORMAL
PHENCYCLIDINE SCREEN URINE: NORMAL
PROPOXYPHENE SCREEN, URINE: NORMAL
SYNTHETIC CANNABINOIDS(K2) SCREEN, URINE: NORMAL
THC SCREEN, URINE: NORMAL
TRAMADOL SCREEN URINE: NORMAL
TRICYCLIC ANTIDEPRESSANTS, UR: NORMAL
TRIGL SERPL-MCNC: 117 MG/DL (ref 0–149)

## 2021-07-29 PROCEDURE — G0438 PPPS, INITIAL VISIT: HCPCS | Performed by: NURSE PRACTITIONER

## 2021-07-29 PROCEDURE — 1036F TOBACCO NON-USER: CPT | Performed by: NURSE PRACTITIONER

## 2021-07-29 PROCEDURE — 80305 DRUG TEST PRSMV DIR OPT OBS: CPT | Performed by: NURSE PRACTITIONER

## 2021-07-29 PROCEDURE — 3017F COLORECTAL CA SCREEN DOC REV: CPT | Performed by: NURSE PRACTITIONER

## 2021-07-29 PROCEDURE — 1123F ACP DISCUSS/DSCN MKR DOCD: CPT | Performed by: NURSE PRACTITIONER

## 2021-07-29 PROCEDURE — G8417 CALC BMI ABV UP PARAM F/U: HCPCS | Performed by: NURSE PRACTITIONER

## 2021-07-29 PROCEDURE — 99214 OFFICE O/P EST MOD 30 MIN: CPT | Performed by: NURSE PRACTITIONER

## 2021-07-29 PROCEDURE — 4040F PNEUMOC VAC/ADMIN/RCVD: CPT | Performed by: NURSE PRACTITIONER

## 2021-07-29 PROCEDURE — G8427 DOCREV CUR MEDS BY ELIG CLIN: HCPCS | Performed by: NURSE PRACTITIONER

## 2021-07-29 RX ORDER — LORAZEPAM 1 MG/1
1 TABLET ORAL 2 TIMES DAILY
Qty: 60 TABLET | Refills: 2
Start: 2021-07-29 | End: 2021-09-02 | Stop reason: SDUPTHER

## 2021-07-29 RX ORDER — GABAPENTIN 100 MG/1
300 CAPSULE ORAL 3 TIMES DAILY
Qty: 90 CAPSULE | Refills: 2 | Status: SHIPPED | OUTPATIENT
Start: 2021-07-29 | End: 2021-01-01

## 2021-07-29 SDOH — ECONOMIC STABILITY: FOOD INSECURITY: WITHIN THE PAST 12 MONTHS, YOU WORRIED THAT YOUR FOOD WOULD RUN OUT BEFORE YOU GOT MONEY TO BUY MORE.: NEVER TRUE

## 2021-07-29 SDOH — ECONOMIC STABILITY: FOOD INSECURITY: WITHIN THE PAST 12 MONTHS, THE FOOD YOU BOUGHT JUST DIDN'T LAST AND YOU DIDN'T HAVE MONEY TO GET MORE.: NEVER TRUE

## 2021-07-29 ASSESSMENT — LIFESTYLE VARIABLES
AUDIT-C TOTAL SCORE: 3
HAVE YOU OR SOMEONE ELSE BEEN INJURED AS A RESULT OF YOUR DRINKING: 0
HOW OFTEN DURING THE LAST YEAR HAVE YOU HAD A FEELING OF GUILT OR REMORSE AFTER DRINKING: 0
HOW OFTEN DURING THE LAST YEAR HAVE YOU NEEDED AN ALCOHOLIC DRINK FIRST THING IN THE MORNING TO GET YOURSELF GOING AFTER A NIGHT OF HEAVY DRINKING: 0
HAS A RELATIVE, FRIEND, DOCTOR, OR ANOTHER HEALTH PROFESSIONAL EXPRESSED CONCERN ABOUT YOUR DRINKING OR SUGGESTED YOU CUT DOWN: 0
HOW OFTEN DO YOU HAVE SIX OR MORE DRINKS ON ONE OCCASION: 0
HOW MANY STANDARD DRINKS CONTAINING ALCOHOL DO YOU HAVE ON A TYPICAL DAY: 0
AUDIT TOTAL SCORE: 3
HOW OFTEN DURING THE LAST YEAR HAVE YOU FOUND THAT YOU WERE NOT ABLE TO STOP DRINKING ONCE YOU HAD STARTED: 0
HOW OFTEN DO YOU HAVE A DRINK CONTAINING ALCOHOL: 3
HOW OFTEN DURING THE LAST YEAR HAVE YOU BEEN UNABLE TO REMEMBER WHAT HAPPENED THE NIGHT BEFORE BECAUSE YOU HAD BEEN DRINKING: 0
HOW OFTEN DURING THE LAST YEAR HAVE YOU FAILED TO DO WHAT WAS NORMALLY EXPECTED FROM YOU BECAUSE OF DRINKING: 0

## 2021-07-29 ASSESSMENT — ENCOUNTER SYMPTOMS
SHORTNESS OF BREATH: 0
COUGH: 0
NAUSEA: 0
VOMITING: 0
EYE DISCHARGE: 0
BLOOD IN STOOL: 0
TROUBLE SWALLOWING: 0
ABDOMINAL PAIN: 0
BACK PAIN: 1
CONSTIPATION: 0
COLOR CHANGE: 0
STRIDOR: 0
WHEEZING: 0
SORE THROAT: 0
DIARRHEA: 0
EYE ITCHING: 0
ABDOMINAL DISTENTION: 0
CHOKING: 0

## 2021-07-29 ASSESSMENT — PATIENT HEALTH QUESTIONNAIRE - PHQ9
SUM OF ALL RESPONSES TO PHQ QUESTIONS 1-9: 0
1. LITTLE INTEREST OR PLEASURE IN DOING THINGS: 0
SUM OF ALL RESPONSES TO PHQ QUESTIONS 1-9: 0
SUM OF ALL RESPONSES TO PHQ QUESTIONS 1-9: 0
SUM OF ALL RESPONSES TO PHQ9 QUESTIONS 1 & 2: 0
2. FEELING DOWN, DEPRESSED OR HOPELESS: 0

## 2021-07-29 ASSESSMENT — SOCIAL DETERMINANTS OF HEALTH (SDOH): HOW HARD IS IT FOR YOU TO PAY FOR THE VERY BASICS LIKE FOOD, HOUSING, MEDICAL CARE, AND HEATING?: NOT HARD AT ALL

## 2021-07-29 NOTE — ASSESSMENT & PLAN NOTE
He had been on 100 to gabapentin he would like to increase to 300 mg which is appropriate 3 times daily.

## 2021-07-29 NOTE — PROGRESS NOTES
[facility] PHYSICIAN SERVICES  [facility] INTERNAL MEDICINE  [address] [address] [place] [address]  Dept: [phone]  Dept Fax: [phone]: [phone]    James Hu (:  1955) is a 73 y.o. male,Established patient, here for evaluation of the following chief complaint(s): Medicare AWV (welcome to medicare)      James Hu is a 73 y.o. male who presents today for his medical conditions/complaints as noted below. James Hu is c/kait Medicare AWV (welcome to medicare)        HPI:     Chief Complaint   Patient presents with    Medicare AWV     welcome to medicare     HPI   #1 hypertension blood pressure is okay today we are Norvasc 10 and Avapro 300  2. #2 hyperlipidemia his cholesterol is okay on Zocor 20 but triglycerides are well over 300. We have not had that issue in the past he had been taking fish oil for just slightly elevated. He has not really changed his diet but the day he had his blood drawn he truly was not fasting so we need to repeat that triglyceride today  #3 ventral hernia this seems to be getting larger for him and he would like to have surgical consult we will refer him to Dr. Stella Nguyễn  4. Anxiety stable with lorazepam twice daily  #5 peripheral neuropathy is having increased pain in his left leg he has been taking 100 but he said that really is not helping he would like to increase it to 300 which is fine  6. Chronic pain syndrome his back pain and knee pain he is on Tylenol No. 4 4 times a day as needed  #7 memory loss he has been noticing lately he is forgetting simple things and having difficulty remembering task etc. 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    Periodic Controlled Substance 34.38 kg/m2 33.25 kg/m2 - 33.41 kg/m2   Some recent data might be hidden       Family History   Problem Relation Age of Onset    Heart Disease Father     Heart Disease Sister     Heart Disease Brother     Stomach Cancer Mother     Cancer Sister         hodgkins    Cancer Mother        Social History     Tobacco Use    Smoking status: Never Smoker    Smokeless tobacco: Never Used   Substance Use Topics    Alcohol use: Yes      Current Outpatient Medications   Medication Sig Dispense Refill    LORazepam (ATIVAN) 1 MG tablet TAKE ONE TABLET BY MOUTH TWICE A DAY AS NEEDED FOR ANXIETY ** MUST LAST 30 DAYS** 60 tablet 0    amLODIPine (NORVASC) 10 MG tablet Take 1 tablet by mouth daily 90 tablet 1    potassium chloride (KLOR-CON M) 20 MEQ extended release tablet Take 1 tablet by mouth daily (Patient taking differently: Take 20 mEq by mouth daily Taking every other day) 90 tablet 1    irbesartan (AVAPRO) 300 MG tablet Take 1 tablet by mouth nightly 90 tablet 3    acetaminophen-codeine (TYLENOL #4) 300-60 MG per tablet Take 1 tablet by mouth 4 times daily as needed for Pain for up to 30 days. 120 tablet 0    simvastatin (ZOCOR) 20 MG tablet Take 1 tablet by mouth nightly 90 tablet 3    meclizine (ANTIVERT) 25 MG tablet Take 1 tablet by mouth 3 times daily as needed for Dizziness 270 tablet 1    furosemide (LASIX) 20 MG tablet Take 1 tablet by mouth daily 180 tablet 3    buPROPion (WELLBUTRIN) 75 MG tablet Take 1 tablet twice daily for 3 days, then 2 in am and 1 pm for 3 days, than 2 tabs BID (Patient taking differently: Take 75 mg by mouth daily ) 360 tablet 3    lidocaine viscous (XYLOCAINE) 2 % solution Take 15 mLs by mouth as needed for Irritation 100 mL 1    cloNIDine (CATAPRES) 0.1 MG tablet Take 1 tablet by mouth as needed for High Blood Pressure 180 tablet 3    gabapentin (NEURONTIN) 100 MG capsule Take 1 capsule by mouth 3 times daily for 89 days.  (Patient not taking: Reported on 7/29/2021) 90 capsule 2     No current facility-administered medications for this visit. Allergies   Allergen Reactions    Nitroglycerin Other (See Comments)     Blood pressure problems, very bad, happened in [facility]. Blood pressure problems, very bad, happened in [facility]. Blood pressure problems, very bad, happened in [facility].     Nitroglycerin        Health Maintenance   Topic Date Due    COVID-19 Vaccine (1) Never done    HIV screen  Never done    Colon cancer screen colonoscopy  Never done    Shingles Vaccine (1 of 2) Never done    Pneumococcal 65+ years Vaccine (1 of 1 - PPSV23) Never done   [employer] Visit (AWV)  Never done    Flu vaccine (1) 09/01/2021    Lipid screen  07/28/2022    Potassium monitoring  07/28/2022    Creatinine monitoring  07/28/2022    DTaP/Tdap/Td vaccine (2 - Td or Tdap) 10/14/2029    Hepatitis C screen  Completed    Hepatitis A vaccine  Aged Out    Hepatitis B vaccine  Aged Out    Hib vaccine  Aged Out    Meningococcal (ACWY) vaccine  Aged Out       No results found for: LABA1C  Lab Results   Component Value Date    PSA 3.50 08/19/2020    PSA 1.71 01/14/2019     TSH   Date Value Ref Range Status   07/28/2021 1.430 0.270 - 4.200 uIU/mL Final   ]  Lab Results   Component Value Date     07/28/2021    K 3.8 07/28/2021     07/28/2021    CO2 30 (H) 07/28/2021    BUN 10 07/28/2021    CREATININE 0.8 07/28/2021    GLUCOSE 92 07/28/2021    CALCIUM 9.5 07/28/2021    PROT 7.1 07/28/2021    LABALBU 4.3 07/28/2021    BILITOT 0.6 07/28/2021    ALKPHOS 87 07/28/2021    AST 29 07/28/2021    ALT 36 07/28/2021    LABGLOM >60 07/28/2021    GFRAA >59 07/28/2021     Lab Results   Component Value Date    CHOL 187 07/28/2021    CHOL 132 (L) 08/19/2020    CHOL 220 (H) 01/14/2019     Lab Results   Component Value Date    TRIG 340 (H) 07/28/2021    TRIG 146 08/19/2020    TRIG 263 (H) 01/14/2019     Lab Results   Component Value Date    HDL 72 07/28/2021    HDL 40 (L) 08/19/2020 HDL 87 01/14/2019     Lab Results   Component Value Date    LDLCALC 47 07/28/2021    LDLCALC 63 08/19/2020    LDLCALC 80 01/14/2019     Lab Results   Component Value Date     07/28/2021     04/08/2011    K 3.8 07/28/2021    K 4.2 04/08/2011     07/28/2021     04/08/2011    CO2 30 07/28/2021    BUN 10 07/28/2021    CREATININE 0.8 07/28/2021    CREATININE 1.0 04/08/2011    GLUCOSE 92 07/28/2021    CALCIUM 9.5 07/28/2021      Lab Results   Component Value Date    WBC 7.1 07/28/2021    HGB 15.6 07/28/2021    HCT 45.3 07/28/2021    .7 (H) 07/28/2021     07/28/2021    LABLYMP 2.15 02/11/2015    LYMPHOPCT 39.6 07/28/2021    RBC 4.50 (L) 07/28/2021    MCH 34.7 (H) 07/28/2021    MCHC 34.4 07/28/2021    RDW 13.5 07/28/2021     Lab Results   Component Value Date    VITD25 83.1 07/28/2021     Labs reviewed from 7/28/2021  Diagnostics reviewed from 7/28/2021  Subjective:      Review of Systems   Constitutional: Negative for fatigue, fever and unexpected weight change. HENT: Negative for ear discharge, ear pain, mouth sores, sore throat and trouble swallowing. Eyes: Negative for discharge, itching and visual disturbance. Respiratory: Negative for cough, choking, shortness of breath, wheezing and stridor. Cardiovascular: Negative for chest pain, palpitations and leg swelling. Gastrointestinal: Negative for abdominal distention, abdominal pain, blood in stool, constipation, diarrhea, nausea and vomiting. Hernia   Endocrine: Negative for cold intolerance, polydipsia and polyuria. Genitourinary: Negative for difficulty urinating, dysuria, frequency and urgency. Musculoskeletal: Positive for back pain. Negative for arthralgias and gait problem. Skin: Negative for color change and rash. Allergic/Immunologic: Negative for food allergies and immunocompromised state. Neurological: Negative for dizziness, tremors, syncope, speech difficulty, weakness and headaches. Peripheral neuropathy   Hematological: Negative for adenopathy. Does not bruise/bleed easily. Psychiatric/Behavioral: Negative for confusion and hallucinations. The patient is nervous/anxious. Objective:     Physical Exam  Constitutional:       General: He is not in acute distress. Appearance: He is well-developed. HENT:      Head: Normocephalic and atraumatic. Eyes:      General: No scleral icterus. Right eye: No discharge. Left eye: No discharge. Pupils: Pupils are equal, round, and reactive to light. Neck:      Thyroid: No thyromegaly. Vascular: No JVD. Cardiovascular:      Rate and Rhythm: Normal rate and regular rhythm. Heart sounds: Normal heart sounds. No murmur heard. Pulmonary:      Effort: Pulmonary effort is normal. No respiratory distress. Breath sounds: Normal breath sounds. No wheezing or rales. Abdominal:      General: Bowel sounds are normal. There is no distension. Palpations: Abdomen is soft. There is no mass. Tenderness: There is no abdominal tenderness. There is no guarding or rebound. Comments: Small abdominal hernia easily reducible,  not painful   Musculoskeletal:         General: No tenderness. Normal range of motion. Cervical back: Normal range of motion and neck supple. Skin:     General: Skin is warm and dry. Findings: No erythema or rash. Neurological:      Mental Status: He is alert and oriented to person, place, and time. Cranial Nerves: No cranial nerve deficit. Coordination: Coordination normal.      Deep Tendon Reflexes: Reflexes are normal and symmetric. Reflexes normal.   Psychiatric:         Mood and Affect: Mood is not depressed. Behavior: Behavior normal.         Thought Content:  Thought content normal.         Judgment: Judgment normal.       /72   Pulse 86   Ht 5' 6\" (1.676 m)   Wt 213 lb (96.6 kg)   SpO2 98%   BMI 34.38 kg/m²           Assessment: Problem List     Anxiety     Stable with lorazepam twice daily          Relevant Medications    buPROPion (WELLBUTRIN) 75 MG tablet    LORazepam (ATIVAN) 1 MG tablet    Benign essential HTN     Avapro 300 and Norvasc 10 with good control          Chronic pain syndrome     Stable with Tylenol No. 4 3 times daily for back pain and knee pain          Relevant Medications    buPROPion (WELLBUTRIN) 75 MG tablet    acetaminophen-codeine (TYLENOL #4) 300-60 MG per tablet    Memory loss     We have recommended Prevagen          Mixed hyperlipidemia     We will repeat his lipids today but likely change him over to Crestor and Zetia          Relevant Medications    cloNIDine (CATAPRES) 0.1 MG tablet    furosemide (LASIX) 20 MG tablet    simvastatin (ZOCOR) 20 MG tablet    amLODIPine (NORVASC) 10 MG tablet    irbesartan (AVAPRO) 300 MG tablet    Other Relevant Orders    Triglyceride    Peripheral polyneuropathy     He had been on 100 to gabapentin he would like to increase to 300 mg which is appropriate 3 times daily. Relevant Medications    buPROPion (WELLBUTRIN) 75 MG tablet    LORazepam (ATIVAN) 1 MG tablet    Ventral hernia     Refer to general surgery                Plan:        Patient given educational materials - see patient instructions. Discussed use, benefit, and side effects of prescribed medications. Allpatient questions answered. Pt voiced understanding. Reviewed health maintenance. Instructed to continue current medications, diet and exercise. Patient agreed with treatment plan. Follow up as directed. MEDICATIONS:  No orders of the defined types were placed in this encounter. ORDERS:  Orders Placed This Encounter   Procedures    Triglyceride    POCT Rapid Drug Screen       Follow-up:  Return for Medicare Annual Wellness Visit in 1 year. PATIENT INSTRUCTIONS:  Patient Instructions   1. Hypertension stable on current meds no changes are necessary  2.   Hyperlipidemia we will recheck triglycerides today but likely we will be stopping the Zocor and starting Crestor and Zetia  3. Ventral hernia we have put in a referral to Dr. Ni Alvares  4. Anxiety stable with lorazepam no changes necessary  5. Peripheral neuropathy with increased gabapentin to 303 times a day  6. Chronic pain syndrome stable on current dose of Tylenol or 4  7. Memory impairment we will try Privigen over-the-counter  Personalized Preventive Plan for Jen Zepeda - 7/29/2021  Medicare offers a range of preventive health benefits. Some of the tests and screenings are paid in full while other may be subject to a deductible, co-insurance, and/or copay. Some of these benefits include a comprehensive review of your medical history including lifestyle, illnesses that may run in your family, and various assessments and screenings as appropriate. After reviewing your medical record and screening and assessments performed today your provider may have ordered immunizations, labs, imaging, and/or referrals for you. A list of these orders (if applicable) as well as your Preventive Care list are included within your After Visit Summary for your review. Other Preventive Recommendations:    · A preventive eye exam performed by an eye specialist is recommended every 1-2 years to screen for glaucoma; cataracts, macular degeneration, and other eye disorders. · A preventive dental visit is recommended every 6 months. · Try to get at least 150 minutes of exercise per week or 10,000 steps per day on a pedometer . · Order or download the FREE \"Exercise & Physical Activity: Your Everyday Guide\" from [employer]. Call 1-975.603.5690 or search [employer] online. · You need 9503-2892 mg of calcium and 9548-5834 IU of vitamin D per day.  It is possible to meet your calcium requirement with diet alone, but a vitamin D supplement is usually necessary to meet this goal.  · When exposed to the sun, use a sunscreen that protects against both UVA and UVB radiation with an SPF of 30 or greater. Reapply every 2 to 3 hours or after sweating, drying off with a towel, or swimming. · Always wear a seat belt when traveling in a car. Always wear a helmet when riding a bicycle or motorcycle. Electronically signed by EMILIA Valdivia on 2021 at 10:46 AM        [employer]agon/transcription disclaimer:  Much of this encounter note is electronic transcription/translation of spoken language to printed texts. The electronic translation of spoken language may be erroneous, or at times,nonsensical words or phrases may be inadvertently transcribed. Although I have reviewed the note for such errors, some may still exist.  Medicare Annual Wellness Visit  Name: Mary Hallman Date: 2021   MRN: [id number] Sex: Male   Age: 73 y.o. Ethnicity: Non- / Non    : 1955 Race: White (non-)      Mildred Chu is here for Medicare AWV (welcome to medicare)    Screenings for behavioral, psychosocial and functional/safety risks, and cognitive dysfunction are all negative except as indicated below. These results, as well as other patient data from the [address] form, are documented in Flowsheets linked to this Encounter. Allergies   Allergen Reactions    Nitroglycerin Other (See Comments)     Blood pressure problems, very bad, happened in [facility]. Blood pressure problems, very bad, happened in [facility]. Blood pressure problems, very bad, happened in [facility] [facility].  Nitroglycerin        Prior to Visit Medications    Medication Sig Taking?  Authorizing Provider   LORazepam (ATIVAN) 1 MG tablet TAKE ONE TABLET BY MOUTH TWICE A DAY AS NEEDED FOR ANXIETY ** MUST LAST 30 DAYS** Yes EMILIA Valdivia   amLODIPine (NORVASC) 10 MG tablet Take 1 tablet by mouth daily Yes EMILIA Valdivia   potassium chloride (KLOR-CON M) 20 MEQ extended release tablet Take 1 tablet by mouth daily  Patient taking differently: Take 20 mEq by mouth daily Taking every other day Yes Elise Snellen, APRN   irbesartan (AVAPRO) 300 MG tablet Take 1 tablet by mouth nightly Yes Elise Snellen, APRN   acetaminophen-codeine (TYLENOL #4) 300-60 MG per tablet Take 1 tablet by mouth 4 times daily as needed for Pain for up to 30 days. Yes Elise Snellen, APRN   simvastatin (ZOCOR) 20 MG tablet Take 1 tablet by mouth nightly Yes Elise Snellen, APRN   meclizine (ANTIVERT) 25 MG tablet Take 1 tablet by mouth 3 times daily as needed for Dizziness Yes Elise Snellen, APRN   furosemide (LASIX) 20 MG tablet Take 1 tablet by mouth daily Yes Elise Snellen, APRN   buPROPion (WELLBUTRIN) 75 MG tablet Take 1 tablet twice daily for 3 days, then 2 in am and 1 pm for 3 days, than 2 tabs BID  Patient taking differently: Take 75 mg by mouth daily  Yes Elise Snellen, APRN   lidocaine viscous (XYLOCAINE) 2 % solution Take 15 mLs by mouth as needed for Irritation Yes Elise Snellen, APRN   cloNIDine (CATAPRES) 0.1 MG tablet Take 1 tablet by mouth as needed for High Blood Pressure Yes Elise Snellen, APRN   gabapentin (NEURONTIN) 100 MG capsule Take 1 capsule by mouth 3 times daily for 89 days.   Patient not taking: Reported on 7/29/2021  Elise Snellen, APRN       Past Medical History:   Diagnosis Date    Anxiety and depression     Atypical chest pain     Elevated lipids     Gastritis     GERD (gastroesophageal reflux disease)     Hepatitis A     Hiatal hernia     History of kidney stones     HTN (hypertension)     Hyperlipidemia     Hypertension     Irregular Z line of esophagus     Libido, decreased     Migraines        Past Surgical History:   Procedure Laterality Date    CHOLECYSTECTOMY      [facility]    COLONOSCOPY      COLONOSCOPY  10-3-06    Dr Ayleen Mahoney    [facility]      KNEE ARTHROSCOPY Bilateral     KNEE SURGERY Bilateral     2 X'S ON LEFT ONCE ON THE RIGHT    UPPER GASTROINTESTINAL ENDOSCOPY  11-     [facility]    UPPER GASTROINTESTINAL ENDOSCOPY      UPPER GASTROINTESTINAL ENDOSCOPY  8-30-01    Dr Christine Allen  10-10-06    Dr Christine Allen  11-18-10    Dr Alina Moore       Family History   Problem Relation Age of Onset    Heart Disease Father     Heart Disease Sister     Heart Disease Brother     Stomach Cancer Mother     Cancer Sister         hodgkins    Cancer Mother        CareTeam (Including outside providers/suppliers regularly involved in providing care):   Patient Care Team:  EMILIA Tran as PCP - General (Nurse Practitioner Acute Care)  EMILIA Tran as PCP - Eduar Foss Provider    Wt Readings from Last 3 Encounters:   07/29/21 213 lb (96.6 kg)   02/25/21 206 lb (93.4 kg)   11/19/20 207 lb (93.9 kg)     Vitals:    07/29/21 0851 07/29/21 0900 07/29/21 1037   BP: (!) 148/82 (!) 142/78 132/72   Site: Left Upper Arm     Pulse: 86     SpO2: 98%     Weight: 213 lb (96.6 kg)     Height: 5' 6\" (1.676 m)       Body mass index is 34.38 kg/m². Based upon direct observation of the patient, evaluation of cognition reveals recent and remote memory intact. Patient's complete Health Risk Assessment and screening values have been reviewed and are found in Flowsheets. The following problems were reviewed today and where indicated follow up appointments were made and/or referrals ordered. Positive Risk Factor Screenings with Interventions:          General Health and ACP:  General  In general, how would you say your health is?: Good  In the past 7 days, have you experienced any of the following?  New or Increased Pain, New or Increased Fatigue, Loneliness, Social Isolation, Stress or Anger?: None of These  Do you get the social and emotional support that you need?: Yes  Do you have a Living Will?: (!) No  Advance Directives     Power of [address] Will ACP-Advance Directive ACP-Power of     Not on File Not on File Not on File Not on File      General Health Risk Interventions:  · No Living Will: Patient declines ACP discussion/assistance    Health Habits/Nutrition:  Health Habits/Nutrition  Do you exercise for at least 20 minutes 2-3 times per week?: Yes  Have you lost any weight without trying in the past 3 months?: No  Do you eat only one meal per day?: No  Have you seen the dentist within the past year?: (!) No  Body mass index: (!) 34.38  Health Habits/Nutrition Interventions:  · Inadequate physical activity:  patient agrees to exercise for at least 150 minutes/week, patient is not ready to increase his/her physical activity level at this time    Hearing/Vision:  No exam data present  Hearing/Vision  Do you or your family notice any trouble with your hearing that hasn't been managed with hearing aids?: No  Do you have difficulty driving, watching TV, or doing any of your daily activities because of your eyesight?: No  Have you had an eye exam within the past year?: (!) No  Hearing/Vision Interventions:  · Vision concerns:  patient encouraged to make appointment with his/her eye specialist      Personalized Preventive Plan   Current Health Maintenance Status  Immunization History   Administered Date(s) Administered    Influenza, Quadv, IM, PF (6 mo and older Fluzone, Flulaval, Fluarix, and 3 yrs and older Afluria) 10/27/2020    Influenza, Triv, inactivated, subunit, adjuvanted, IM (Fluad 65 yrs and older) 11/05/2019    PPD Test 10/27/2020    Tdap (Boostrix, Adacel) 10/14/2019        Health Maintenance   Topic Date Due    COVID-19 Vaccine (1) Never done    HIV screen  Never done    Colon cancer screen colonoscopy  Never done    Shingles Vaccine (1 of 2) Never done    Pneumococcal 65+ years Vaccine (1 of 1 - PPSV23) Never done   [employer] Visit (AWV)  Never done    Flu vaccine (1) 09/01/2021    Lipid screen  07/28/2022    Potassium monitoring  07/28/2022    Creatinine monitoring  07/28/2022    DTaP/Tdap/Td vaccine (2 - Td or Tdap) 10/14/2029    Hepatitis C screen  Completed    Hepatitis A vaccine  Aged Out    Hepatitis B vaccine  Aged Out    Hib vaccine  Aged Out    Meningococcal (ACWY) vaccine  Aged Out     Recommendations for bewarket Due: see orders and patient instructions/AVS.  . Recommended screening schedule for the next 5-10 years is provided to the patient in written form: see Patient Instructions/AVS.    Jen was seen today for medicare awv. Diagnoses and all orders for this visit:    Long-term use of high-risk medication  -     POCT Rapid Drug Screen    Welcome to Medicare preventive visit  -     Cancel: EKG 12 lead; Future    Peripheral polyneuropathy    Anxiety    Routine general medical examination at a health care facility    Mixed hyperlipidemia  -     Triglyceride;  Future    Ventral hernia without obstruction or gangrene    Encounter for screening for malignant neoplasm of colon    Benign essential HTN    Chronic pain syndrome    Memory loss

## 2021-08-04 ENCOUNTER — OFFICE VISIT (OUTPATIENT)
Dept: SURGERY | Age: 66
End: 2021-08-04
Payer: MEDICARE

## 2021-08-04 VITALS
HEART RATE: 77 BPM | BODY MASS INDEX: 34.07 KG/M2 | DIASTOLIC BLOOD PRESSURE: 80 MMHG | OXYGEN SATURATION: 98 % | TEMPERATURE: 97.7 F | HEIGHT: 66 IN | WEIGHT: 212 LBS | SYSTOLIC BLOOD PRESSURE: 132 MMHG

## 2021-08-04 DIAGNOSIS — K43.0 INCISIONAL HERNIA, INCARCERATED: ICD-10-CM

## 2021-08-04 PROCEDURE — 99203 OFFICE O/P NEW LOW 30 MIN: CPT | Performed by: SURGERY

## 2021-08-04 PROCEDURE — 3017F COLORECTAL CA SCREEN DOC REV: CPT | Performed by: SURGERY

## 2021-08-04 PROCEDURE — 1123F ACP DISCUSS/DSCN MKR DOCD: CPT | Performed by: SURGERY

## 2021-08-04 PROCEDURE — 4040F PNEUMOC VAC/ADMIN/RCVD: CPT | Performed by: SURGERY

## 2021-08-04 PROCEDURE — 1036F TOBACCO NON-USER: CPT | Performed by: SURGERY

## 2021-08-04 PROCEDURE — G8417 CALC BMI ABV UP PARAM F/U: HCPCS | Performed by: SURGERY

## 2021-08-04 PROCEDURE — G8427 DOCREV CUR MEDS BY ELIG CLIN: HCPCS | Performed by: SURGERY

## 2021-08-04 RX ORDER — SODIUM CHLORIDE 0.9 % (FLUSH) 0.9 %
10 SYRINGE (ML) INJECTION PRN
Status: CANCELLED | OUTPATIENT
Start: 2021-08-04

## 2021-08-04 RX ORDER — HEPARIN SODIUM 5000 [USP'U]/ML
5000 INJECTION, SOLUTION INTRAVENOUS; SUBCUTANEOUS ONCE
Status: CANCELLED | OUTPATIENT
Start: 2021-08-04 | End: 2021-08-04

## 2021-08-04 RX ORDER — SODIUM CHLORIDE, SODIUM LACTATE, POTASSIUM CHLORIDE, CALCIUM CHLORIDE 600; 310; 30; 20 MG/100ML; MG/100ML; MG/100ML; MG/100ML
INJECTION, SOLUTION INTRAVENOUS CONTINUOUS
Status: CANCELLED | OUTPATIENT
Start: 2021-08-04

## 2021-08-04 RX ORDER — SODIUM CHLORIDE 0.9 % (FLUSH) 0.9 %
10 SYRINGE (ML) INJECTION EVERY 12 HOURS SCHEDULED
Status: CANCELLED | OUTPATIENT
Start: 2021-08-04

## 2021-08-04 RX ORDER — SODIUM CHLORIDE 9 MG/ML
25 INJECTION, SOLUTION INTRAVENOUS PRN
Status: CANCELLED | OUTPATIENT
Start: 2021-08-04

## 2021-08-04 ASSESSMENT — ENCOUNTER SYMPTOMS
EYE PAIN: 0
ABDOMINAL PAIN: 0
CONSTIPATION: 0
CHEST TIGHTNESS: 0
COLOR CHANGE: 0
DIARRHEA: 0
VOMITING: 0
ABDOMINAL DISTENTION: 0
BACK PAIN: 0
SORE THROAT: 0
SHORTNESS OF BREATH: 0
EYE REDNESS: 0
NAUSEA: 0
COUGH: 0

## 2021-08-04 NOTE — H&P
111 [facility] Surgery History and Physical   SUBJECTIVE:  Mr. Jerod Mendoza is a 73 y.o. male who presents today with about an eight year history of a bulge in his central abdomen next to his incision from his lap cm. He notes there really isn't any pain in the area but it is unsightly. He is able to push it in but it doesn't go away. It is soft. Denies obstructive symptoms. Past Medical History:   Diagnosis Date    Anxiety and depression     Atypical chest pain     Elevated lipids     Gastritis     GERD (gastroesophageal reflux disease)     Hepatitis A     Hiatal hernia     History of kidney stones     HTN (hypertension)     Hyperlipidemia     Hypertension     Irregular Z line of esophagus     Libido, decreased     Migraines      Past Surgical History:   Procedure Laterality Date    CHOLECYSTECTOMY      [facility]  11-    [facility]    COLONOSCOPY      COLONOSCOPY  10-3-06    Dr Harjeet Proctor    COLONOSCOPY  94-00-72    Dr Marcelo Fournier ARTHROSCOPY Bilateral     KNEE SURGERY Bilateral     2 X'S ON LEFT ONCE ON THE RIGHT    UPPER GASTROINTESTINAL ENDOSCOPY  11-     [facility]    UPPER GASTROINTESTINAL ENDOSCOPY      UPPER GASTROINTESTINAL ENDOSCOPY  8-30-01    Dr Daniel Andujar  10-10-06    Dr Daniel Andujar  11-18-10    Dr Harjeet Proctor     Current Outpatient Medications   Medication Sig Dispense Refill    gabapentin (NEURONTIN) 100 MG capsule Take 3 capsules by mouth 3 times daily for 90 days. 90 capsule 2    LORazepam (ATIVAN) 1 MG tablet Take 1 tablet by mouth 2 times daily for 90 days.  60 tablet 2    amLODIPine (NORVASC) 10 MG tablet Take 1 tablet by mouth daily 90 tablet 1    potassium chloride (KLOR-CON M) 20 MEQ extended release tablet Take 1 tablet by mouth daily (Patient taking differently: Take 20 mEq by mouth daily Taking every other day) 90 tablet 1    irbesartan (AVAPRO) 300 MG tablet Take 1 tablet by mouth nightly 90 tablet 3    simvastatin (ZOCOR) 20 MG tablet Take 1 tablet by mouth nightly 90 tablet 3    furosemide (LASIX) 20 MG tablet Take 1 tablet by mouth daily 180 tablet 3    buPROPion (WELLBUTRIN) 75 MG tablet Take 1 tablet twice daily for 3 days, then 2 in am and 1 pm for 3 days, than 2 tabs BID (Patient taking differently: Take 75 mg by mouth daily ) 360 tablet 3    acetaminophen-codeine (TYLENOL #4) 300-60 MG per tablet Take 1 tablet by mouth 4 times daily as needed for Pain for up to 30 days. 120 tablet 0    meclizine (ANTIVERT) 25 MG tablet Take 1 tablet by mouth 3 times daily as needed for Dizziness (Patient not taking: Reported on 8/4/2021) 270 tablet 1    cloNIDine (CATAPRES) 0.1 MG tablet Take 1 tablet by mouth as needed for High Blood Pressure (Patient not taking: Reported on 8/4/2021) 180 tablet 3     No current facility-administered medications for this visit. Allergies: Nitroglycerin and Nitroglycerin    Family History   Problem Relation Age of Onset    Heart Disease Father     Heart Disease Sister     Heart Disease Brother     Stomach Cancer Mother     Cancer Sister         hodgkins    Cancer Mother        Social History     Tobacco Use    Smoking status: Never Smoker    Smokeless tobacco: Never Used   Substance Use Topics    Alcohol use: Yes       Review of Systems   Constitutional: Negative for fatigue, fever and unexpected weight change. HENT: Negative for hearing loss, nosebleeds and sore throat. Eyes: Negative for pain, redness and visual disturbance. Respiratory: Negative for cough, chest tightness and shortness of breath. Cardiovascular: Negative for chest pain, palpitations and leg swelling. Gastrointestinal: Negative for abdominal distention, abdominal pain, constipation, diarrhea, nausea and vomiting.    Endocrine: Negative for cold intolerance, heat intolerance and polydipsia. Genitourinary: Negative for difficulty urinating, frequency and urgency. Musculoskeletal: Negative for back pain, joint swelling and neck pain. Skin: Negative for color change, rash and wound. Allergic/Immunologic: Negative for environmental allergies and food allergies. Neurological: Negative for seizures, light-headedness and headaches. Hematological: Negative for adenopathy. Does not bruise/bleed easily. Psychiatric/Behavioral: Negative for confusion, sleep disturbance and suicidal ideas. OBJECTIVE:  /80 (Site: Right Upper Arm, Position: Sitting, Cuff Size: Medium Adult)   Pulse 77   Temp 97.7 °F (36.5 °C) (Temporal)   Ht 5' 6\" (1.676 m)   Wt 212 lb (96.2 kg)   SpO2 98%   BMI 34.22 kg/m²   Physical Exam  Vitals reviewed. Constitutional:       Appearance: He is well-developed. HENT:      Head: Normocephalic and atraumatic. Eyes:      Pupils: Pupils are equal, round, and reactive to light. Cardiovascular:      Rate and Rhythm: Normal rate and regular rhythm. Heart sounds: Normal heart sounds. Pulmonary:      Effort: Pulmonary effort is normal.      Breath sounds: Normal breath sounds. No wheezing or rales. Abdominal:      General: A surgical scar is present. There is no distension. Palpations: Abdomen is soft. There is no mass. Tenderness: There is no abdominal tenderness. There is no guarding or rebound. Hernia: A hernia (left to midline incisional scar) is present. Musculoskeletal:         General: Normal range of motion. Cervical back: Normal range of motion and neck supple. Lymphadenopathy:      Cervical: No cervical adenopathy. Skin:     General: Skin is warm and dry. Neurological:      Mental Status: He is alert and oriented to person, place, and time. Psychiatric:         Behavior: Behavior normal.         Thought Content:  Thought content normal.         Judgment: Judgment normal. ASSESSMENT:   Diagnosis Orders   1. Incisional hernia, incarcerated         PLAN:      The risks, benefits, and alternatives were discussed with the pt. He is willing to accept the risks and proceed with a robotic assisted incisional hernia repair with mesh. The surgical risks included but not limited to bleeding, infection, perforation, recurrence, risk of needing further surgery, etc. The anesthetic risks included heart attacks, strokes, pneumonia, phlebitis, etc.  He is willing to accept all risks and proceed with surgery. No guarantees have been given. Return for Post-operative Visit.     Tyshawn Fitzgerald DO 5/5/6650 11:13 AM

## 2021-08-04 NOTE — PROGRESS NOTES
SUBJECTIVE:  Mr. Magda Carranza is a 73 y.o. male who presents today with about an eight year history of a bulge in his central abdomen next to his incision from his lap cm. He notes there really isn't any pain in the area but it is unsightly. He is able to push it in but it doesn't go away. It is soft. Denies obstructive symptoms. Past Medical History:   Diagnosis Date    Anxiety and depression     Atypical chest pain     Elevated lipids     Gastritis     GERD (gastroesophageal reflux disease)     Hepatitis A     Hiatal hernia     History of kidney stones     HTN (hypertension)     Hyperlipidemia     Hypertension     Irregular Z line of esophagus     Libido, decreased     Migraines      Past Surgical History:   Procedure Laterality Date    CHOLECYSTECTOMY      [facility]  11-    [facility]    COLONOSCOPY      COLONOSCOPY  10-3-06    Dr Rosita Fields    COLONOSCOPY  [id number]    Dr Dung Owens ARTHROSCOPY Bilateral     KNEE SURGERY Bilateral     2 X'S ON LEFT ONCE ON THE RIGHT    UPPER GASTROINTESTINAL ENDOSCOPY  11-     [facility]    UPPER GASTROINTESTINAL ENDOSCOPY      UPPER GASTROINTESTINAL ENDOSCOPY  8-30-01    Dr Sandra Jeffers  10-10-06    Dr Sandra Jeffers  11-18-10    Dr Rosita Fields     Current Outpatient Medications   Medication Sig Dispense Refill    gabapentin (NEURONTIN) 100 MG capsule Take 3 capsules by mouth 3 times daily for 90 days. 90 capsule 2    LORazepam (ATIVAN) 1 MG tablet Take 1 tablet by mouth 2 times daily for 90 days.  60 tablet 2    amLODIPine (NORVASC) 10 MG tablet Take 1 tablet by mouth daily 90 tablet 1    potassium chloride (KLOR-CON M) 20 MEQ extended release tablet Take 1 tablet by mouth daily (Patient taking differently: Take 20 mEq by mouth daily Taking every other day) 90 tablet 1    irbesartan (AVAPRO) 300 MG tablet Take 1 tablet by mouth nightly 90 tablet 3    simvastatin (ZOCOR) 20 MG tablet Take 1 tablet by mouth nightly 90 tablet 3    furosemide (LASIX) 20 MG tablet Take 1 tablet by mouth daily 180 tablet 3    buPROPion (WELLBUTRIN) 75 MG tablet Take 1 tablet twice daily for 3 days, then 2 in am and 1 pm for 3 days, than 2 tabs BID (Patient taking differently: Take 75 mg by mouth daily ) 360 tablet 3    acetaminophen-codeine (TYLENOL #4) 300-60 MG per tablet Take 1 tablet by mouth 4 times daily as needed for Pain for up to 30 days. 120 tablet 0    meclizine (ANTIVERT) 25 MG tablet Take 1 tablet by mouth 3 times daily as needed for Dizziness (Patient not taking: Reported on 8/4/2021) 270 tablet 1    cloNIDine (CATAPRES) 0.1 MG tablet Take 1 tablet by mouth as needed for High Blood Pressure (Patient not taking: Reported on 8/4/2021) 180 tablet 3     No current facility-administered medications for this visit. Allergies: Nitroglycerin and Nitroglycerin    Family History   Problem Relation Age of Onset    Heart Disease Father     Heart Disease Sister     Heart Disease Brother     Stomach Cancer Mother     Cancer Sister         hodgkins    Cancer Mother        Social History     Tobacco Use    Smoking status: Never Smoker    Smokeless tobacco: Never Used   Substance Use Topics    Alcohol use: Yes       Review of Systems   Constitutional: Negative for fatigue, fever and unexpected weight change. HENT: Negative for hearing loss, nosebleeds and sore throat. Eyes: Negative for pain, redness and visual disturbance. Respiratory: Negative for cough, chest tightness and shortness of breath. Cardiovascular: Negative for chest pain, palpitations and leg swelling. Gastrointestinal: Negative for abdominal distention, abdominal pain, constipation, diarrhea, nausea and vomiting. Endocrine: Negative for cold intolerance, heat intolerance and polydipsia.    Genitourinary: Negative for difficulty urinating, frequency and urgency. Musculoskeletal: Negative for back pain, joint swelling and neck pain. Skin: Negative for color change, rash and wound. Allergic/Immunologic: Negative for environmental allergies and food allergies. Neurological: Negative for seizures, light-headedness and headaches. Hematological: Negative for adenopathy. Does not bruise/bleed easily. Psychiatric/Behavioral: Negative for confusion, sleep disturbance and suicidal ideas. OBJECTIVE:  /80 (Site: Right Upper Arm, Position: Sitting, Cuff Size: Medium Adult)   Pulse 77   Temp 97.7 °F (36.5 °C) (Temporal)   Ht 5' 6\" (1.676 m)   Wt 212 lb (96.2 kg)   SpO2 98%   BMI 34.22 kg/m²   Physical Exam  Vitals reviewed. Constitutional:       Appearance: He is well-developed. HENT:      Head: Normocephalic and atraumatic. Eyes:      Pupils: Pupils are equal, round, and reactive to light. Cardiovascular:      Rate and Rhythm: Normal rate and regular rhythm. Heart sounds: Normal heart sounds. Pulmonary:      Effort: Pulmonary effort is normal.      Breath sounds: Normal breath sounds. No wheezing or rales. Abdominal:      General: A surgical scar is present. There is no distension. Palpations: Abdomen is soft. There is no mass. Tenderness: There is no abdominal tenderness. There is no guarding or rebound. Hernia: A hernia (left to midline incisional scar) is present. Musculoskeletal:         General: Normal range of motion. Cervical back: Normal range of motion and neck supple. Lymphadenopathy:      Cervical: No cervical adenopathy. Skin:     General: Skin is warm and dry. Neurological:      Mental Status: He is alert and oriented to person, place, and time. Psychiatric:         Behavior: Behavior normal.         Thought Content: Thought content normal.         Judgment: Judgment normal.         ASSESSMENT:   Diagnosis Orders   1.  Incisional hernia, incarcerated         PLAN:      The risks, benefits, and alternatives were discussed with the pt. He is willing to accept the risks and proceed with a robotic assisted incisional hernia repair with mesh. The surgical risks included but not limited to bleeding, infection, perforation, recurrence, risk of needing further surgery, etc. The anesthetic risks included heart attacks, strokes, pneumonia, phlebitis, etc.  He is willing to accept all risks and proceed with surgery. No guarantees have been given. Return for Post-operative Visit.     DO Miky 6/8/9524 11:13 AM

## 2021-08-04 NOTE — LETTER
SCHEDULING INSTRUCTIONS:     Patient: Dema Brunner  : 1955    Hospital[facility] Hospital    Admitting Physician:  Dr. Yoli Warren    Diagnosis: Incarcerated Incisional Hernia     Procedure: Laparoscopic Assisted Robotic Incisional hernia Repair with Mesh     ALLOW ADDITIONAL 30 MINS FOR TURNOVER EXCEPT FOR PHYSICIAN'S BOUNCE DAY    Anesthesia: GEN     Admission:Outpatient     Date: 2021               NOT TO BE USED OUTSIDE OF THE OFFICE

## 2021-08-04 NOTE — H&P (VIEW-ONLY)
111 [facility] Surgery History and Physical   SUBJECTIVE:  Mr. Hilda Mota is a 73 y.o. male who presents today with about an eight year history of a bulge in his central abdomen next to his incision from his lap cm. He notes there really isn't any pain in the area but it is unsightly. He is able to push it in but it doesn't go away. It is soft. Denies obstructive symptoms. Past Medical History:   Diagnosis Date    Anxiety and depression     Atypical chest pain     Elevated lipids     Gastritis     GERD (gastroesophageal reflux disease)     Hepatitis A     Hiatal hernia     History of kidney stones     HTN (hypertension)     Hyperlipidemia     Hypertension     Irregular Z line of esophagus     Libido, decreased     Migraines      Past Surgical History:   Procedure Laterality Date    CHOLECYSTECTOMY      [facility]  11-    [facility]    COLONOSCOPY      COLONOSCOPY  10-3-06    Dr Katarzyna Villalobos    COLONOSCOPY  15-19-12    Dr Neeraj Beckford ARTHROSCOPY Bilateral     KNEE SURGERY Bilateral     2 X'S ON LEFT ONCE ON THE RIGHT    UPPER GASTROINTESTINAL ENDOSCOPY  11-     [facility]    UPPER GASTROINTESTINAL ENDOSCOPY      UPPER GASTROINTESTINAL ENDOSCOPY  8-30-01    Dr Tonny Reece  10-10-06    Dr Tonny Reece  11-18-10    Dr Katarzyna Villalobos     Current Outpatient Medications   Medication Sig Dispense Refill    gabapentin (NEURONTIN) 100 MG capsule Take 3 capsules by mouth 3 times daily for 90 days. 90 capsule 2    LORazepam (ATIVAN) 1 MG tablet Take 1 tablet by mouth 2 times daily for 90 days.  60 tablet 2    amLODIPine (NORVASC) 10 MG tablet Take 1 tablet by mouth daily 90 tablet 1    potassium chloride (KLOR-CON M) 20 MEQ extended release tablet Take 1 tablet by mouth daily (Patient taking differently: Take 20 mEq by mouth daily Taking every other day) 90 tablet 1    irbesartan (AVAPRO) 300 MG tablet Take 1 tablet by mouth nightly 90 tablet 3    simvastatin (ZOCOR) 20 MG tablet Take 1 tablet by mouth nightly 90 tablet 3    furosemide (LASIX) 20 MG tablet Take 1 tablet by mouth daily 180 tablet 3    buPROPion (WELLBUTRIN) 75 MG tablet Take 1 tablet twice daily for 3 days, then 2 in am and 1 pm for 3 days, than 2 tabs BID (Patient taking differently: Take 75 mg by mouth daily ) 360 tablet 3    acetaminophen-codeine (TYLENOL #4) 300-60 MG per tablet Take 1 tablet by mouth 4 times daily as needed for Pain for up to 30 days. 120 tablet 0    meclizine (ANTIVERT) 25 MG tablet Take 1 tablet by mouth 3 times daily as needed for Dizziness (Patient not taking: Reported on 8/4/2021) 270 tablet 1    cloNIDine (CATAPRES) 0.1 MG tablet Take 1 tablet by mouth as needed for High Blood Pressure (Patient not taking: Reported on 8/4/2021) 180 tablet 3     No current facility-administered medications for this visit. Allergies: Nitroglycerin and Nitroglycerin    Family History   Problem Relation Age of Onset    Heart Disease Father     Heart Disease Sister     Heart Disease Brother     Stomach Cancer Mother     Cancer Sister         hodgkins    Cancer Mother        Social History     Tobacco Use    Smoking status: Never Smoker    Smokeless tobacco: Never Used   Substance Use Topics    Alcohol use: Yes       Review of Systems   Constitutional: Negative for fatigue, fever and unexpected weight change. HENT: Negative for hearing loss, nosebleeds and sore throat. Eyes: Negative for pain, redness and visual disturbance. Respiratory: Negative for cough, chest tightness and shortness of breath. Cardiovascular: Negative for chest pain, palpitations and leg swelling. Gastrointestinal: Negative for abdominal distention, abdominal pain, constipation, diarrhea, nausea and vomiting.    Endocrine: Negative for cold intolerance, heat intolerance and polydipsia. Genitourinary: Negative for difficulty urinating, frequency and urgency. Musculoskeletal: Negative for back pain, joint swelling and neck pain. Skin: Negative for color change, rash and wound. Allergic/Immunologic: Negative for environmental allergies and food allergies. Neurological: Negative for seizures, light-headedness and headaches. Hematological: Negative for adenopathy. Does not bruise/bleed easily. Psychiatric/Behavioral: Negative for confusion, sleep disturbance and suicidal ideas. OBJECTIVE:  /80 (Site: Right Upper Arm, Position: Sitting, Cuff Size: Medium Adult)   Pulse 77   Temp 97.7 °F (36.5 °C) (Temporal)   Ht 5' 6\" (1.676 m)   Wt 212 lb (96.2 kg)   SpO2 98%   BMI 34.22 kg/m²   Physical Exam  Vitals reviewed. Constitutional:       Appearance: He is well-developed. HENT:      Head: Normocephalic and atraumatic. Eyes:      Pupils: Pupils are equal, round, and reactive to light. Cardiovascular:      Rate and Rhythm: Normal rate and regular rhythm. Heart sounds: Normal heart sounds. Pulmonary:      Effort: Pulmonary effort is normal.      Breath sounds: Normal breath sounds. No wheezing or rales. Abdominal:      General: A surgical scar is present. There is no distension. Palpations: Abdomen is soft. There is no mass. Tenderness: There is no abdominal tenderness. There is no guarding or rebound. Hernia: A hernia (left to midline incisional scar) is present. Musculoskeletal:         General: Normal range of motion. Cervical back: Normal range of motion and neck supple. Lymphadenopathy:      Cervical: No cervical adenopathy. Skin:     General: Skin is warm and dry. Neurological:      Mental Status: He is alert and oriented to person, place, and time. Psychiatric:         Behavior: Behavior normal.         Thought Content:  Thought content normal.         Judgment: Judgment normal. ASSESSMENT:   Diagnosis Orders   1. Incisional hernia, incarcerated         PLAN:      The risks, benefits, and alternatives were discussed with the pt. He is willing to accept the risks and proceed with a robotic assisted incisional hernia repair with mesh. The surgical risks included but not limited to bleeding, infection, perforation, recurrence, risk of needing further surgery, etc. The anesthetic risks included heart attacks, strokes, pneumonia, phlebitis, etc.  He is willing to accept all risks and proceed with surgery. No guarantees have been given. Return for Post-operative Visit.     Arlyn Jo DO 9/6/9473 11:13 AM

## 2021-08-06 ENCOUNTER — HOSPITAL ENCOUNTER (OUTPATIENT)
Dept: PREADMISSION TESTING | Age: 66
Discharge: HOME | End: 2021-08-10
Payer: MEDICARE

## 2021-08-06 VITALS — HEIGHT: 66 IN | WEIGHT: 212 LBS | BODY MASS INDEX: 34.07 KG/M2

## 2021-08-06 LAB
EKG P AXIS: 60 DEGREES
EKG P-R INTERVAL: 132 MS
EKG Q-T INTERVAL: 412 MS
EKG QRS DURATION: 80 MS
EKG QTC CALCULATION (BAZETT): 412 MS
EKG T AXIS: 22 DEGREES
MRSA SCREEN RT-PCR: NOT DETECTED

## 2021-08-06 PROCEDURE — 87641 MR-STAPH DNA AMP PROBE: CPT

## 2021-08-06 PROCEDURE — 93005 ELECTROCARDIOGRAM TRACING: CPT | Performed by: ANESTHESIOLOGY

## 2021-08-06 RX ORDER — BUPROPION HYDROCHLORIDE 75 MG/1
75 TABLET ORAL DAILY PRN
COMMUNITY

## 2021-08-06 RX ORDER — POTASSIUM CHLORIDE 20 MEQ/1
20 TABLET, EXTENDED RELEASE ORAL EVERY OTHER DAY
COMMUNITY
End: 2022-01-01 | Stop reason: SDUPTHER

## 2021-08-09 ENCOUNTER — HOSPITAL ENCOUNTER (OUTPATIENT)
Age: 66
Setting detail: OUTPATIENT SURGERY
Discharge: HOME | End: 2021-08-09
Attending: SURGERY | Admitting: SURGERY
Payer: MEDICARE

## 2021-08-09 ENCOUNTER — ANESTHESIA (OUTPATIENT)
Dept: OPERATING ROOM | Age: 66
End: 2021-08-09
Payer: MEDICARE

## 2021-08-09 ENCOUNTER — ANESTHESIA EVENT (OUTPATIENT)
Dept: OPERATING ROOM | Age: 66
End: 2021-08-09
Payer: MEDICARE

## 2021-08-09 VITALS
TEMPERATURE: 97.6 F | SYSTOLIC BLOOD PRESSURE: 110 MMHG | DIASTOLIC BLOOD PRESSURE: 73 MMHG | OXYGEN SATURATION: 94 % | WEIGHT: 210 LBS | RESPIRATION RATE: 14 BRPM | HEART RATE: 69 BPM | BODY MASS INDEX: 41.23 KG/M2 | HEIGHT: 60 IN

## 2021-08-09 VITALS — TEMPERATURE: 97 F | SYSTOLIC BLOOD PRESSURE: 108 MMHG | OXYGEN SATURATION: 100 % | DIASTOLIC BLOOD PRESSURE: 67 MMHG

## 2021-08-09 DIAGNOSIS — K43.0 INCISIONAL HERNIA, INCARCERATED: Primary | ICD-10-CM

## 2021-08-09 LAB
HIV-1 P24 AG: NORMAL
RAPID HIV 1&2: NORMAL

## 2021-08-09 PROCEDURE — 6360000002 HC RX W HCPCS: Performed by: ANESTHESIOLOGY

## 2021-08-09 PROCEDURE — 36415 COLL VENOUS BLD VENIPUNCTURE: CPT

## 2021-08-09 PROCEDURE — 3600000009 HC SURGERY ROBOT BASE: Performed by: SURGERY

## 2021-08-09 PROCEDURE — 3700000001 HC ADD 15 MINUTES (ANESTHESIA): Performed by: SURGERY

## 2021-08-09 PROCEDURE — 2500000003 HC RX 250 WO HCPCS: Performed by: SURGERY

## 2021-08-09 PROCEDURE — 6370000000 HC RX 637 (ALT 250 FOR IP): Performed by: ANESTHESIOLOGY

## 2021-08-09 PROCEDURE — 7100000000 HC PACU RECOVERY - FIRST 15 MIN: Performed by: SURGERY

## 2021-08-09 PROCEDURE — S2900 ROBOTIC SURGICAL SYSTEM: HCPCS | Performed by: SURGERY

## 2021-08-09 PROCEDURE — 49655 PR LAP, INCISIONAL HERNIA REPAIR,INCARCERATED: CPT | Performed by: SURGERY

## 2021-08-09 PROCEDURE — 7100000001 HC PACU RECOVERY - ADDTL 15 MIN: Performed by: SURGERY

## 2021-08-09 PROCEDURE — 3700000000 HC ANESTHESIA ATTENDED CARE: Performed by: SURGERY

## 2021-08-09 PROCEDURE — 2580000003 HC RX 258: Performed by: SURGERY

## 2021-08-09 PROCEDURE — 6360000002 HC RX W HCPCS: Performed by: SURGERY

## 2021-08-09 PROCEDURE — 6360000002 HC RX W HCPCS: Performed by: NURSE ANESTHETIST, CERTIFIED REGISTERED

## 2021-08-09 PROCEDURE — 2709999900 HC NON-CHARGEABLE SUPPLY: Performed by: SURGERY

## 2021-08-09 PROCEDURE — 2500000003 HC RX 250 WO HCPCS: Performed by: NURSE ANESTHETIST, CERTIFIED REGISTERED

## 2021-08-09 PROCEDURE — 87806 HIV AG W/HIV1&2 ANTB W/OPTIC: CPT

## 2021-08-09 PROCEDURE — 7100000010 HC PHASE II RECOVERY - FIRST 15 MIN: Performed by: SURGERY

## 2021-08-09 PROCEDURE — 3600000019 HC SURGERY ROBOT ADDTL 15MIN: Performed by: SURGERY

## 2021-08-09 PROCEDURE — C9290 INJ, BUPIVACAINE LIPOSOME: HCPCS | Performed by: SURGERY

## 2021-08-09 PROCEDURE — C1781 MESH (IMPLANTABLE): HCPCS | Performed by: SURGERY

## 2021-08-09 PROCEDURE — 6370000000 HC RX 637 (ALT 250 FOR IP): Performed by: SURGERY

## 2021-08-09 DEVICE — MESH SURG DIA4.5IN CIR W/ ECHO 2 POS SYS VENTRALIGHT: Type: IMPLANTABLE DEVICE | Site: ABDOMEN | Status: FUNCTIONAL

## 2021-08-09 RX ORDER — BUPIVACAINE HYDROCHLORIDE 2.5 MG/ML
INJECTION, SOLUTION INFILTRATION; PERINEURAL PRN
Status: DISCONTINUED | OUTPATIENT
Start: 2021-08-09 | End: 2021-08-09 | Stop reason: ALTCHOICE

## 2021-08-09 RX ORDER — SODIUM CHLORIDE, SODIUM LACTATE, POTASSIUM CHLORIDE, CALCIUM CHLORIDE 600; 310; 30; 20 MG/100ML; MG/100ML; MG/100ML; MG/100ML
INJECTION, SOLUTION INTRAVENOUS CONTINUOUS
Status: DISCONTINUED | OUTPATIENT
Start: 2021-08-09 | End: 2021-08-09 | Stop reason: HOSPADM

## 2021-08-09 RX ORDER — SODIUM CHLORIDE 9 MG/ML
25 INJECTION, SOLUTION INTRAVENOUS PRN
Status: DISCONTINUED | OUTPATIENT
Start: 2021-08-09 | End: 2021-08-09 | Stop reason: HOSPADM

## 2021-08-09 RX ORDER — LIDOCAINE HYDROCHLORIDE 10 MG/ML
1 INJECTION, SOLUTION EPIDURAL; INFILTRATION; INTRACAUDAL; PERINEURAL
Status: DISCONTINUED | OUTPATIENT
Start: 2021-08-09 | End: 2021-08-09 | Stop reason: HOSPADM

## 2021-08-09 RX ORDER — ROCURONIUM BROMIDE 10 MG/ML
INJECTION, SOLUTION INTRAVENOUS PRN
Status: DISCONTINUED | OUTPATIENT
Start: 2021-08-09 | End: 2021-08-09 | Stop reason: SDUPTHER

## 2021-08-09 RX ORDER — OXYCODONE HYDROCHLORIDE AND ACETAMINOPHEN 5; 325 MG/1; MG/1
1 TABLET ORAL EVERY 4 HOURS PRN
Qty: 18 TABLET | Refills: 0 | Status: SHIPPED | OUTPATIENT
Start: 2021-08-09 | End: 2021-08-12

## 2021-08-09 RX ORDER — SODIUM CHLORIDE 0.9 % (FLUSH) 0.9 %
10 SYRINGE (ML) INJECTION PRN
Status: DISCONTINUED | OUTPATIENT
Start: 2021-08-09 | End: 2021-08-09 | Stop reason: HOSPADM

## 2021-08-09 RX ORDER — SODIUM CHLORIDE 0.9 % (FLUSH) 0.9 %
5-40 SYRINGE (ML) INJECTION EVERY 12 HOURS SCHEDULED
Status: DISCONTINUED | OUTPATIENT
Start: 2021-08-09 | End: 2021-08-09 | Stop reason: HOSPADM

## 2021-08-09 RX ORDER — OXYCODONE HYDROCHLORIDE AND ACETAMINOPHEN 5; 325 MG/1; MG/1
1 TABLET ORAL ONCE
Status: COMPLETED | OUTPATIENT
Start: 2021-08-09 | End: 2021-08-09

## 2021-08-09 RX ORDER — HEPARIN SODIUM 5000 [USP'U]/ML
5000 INJECTION, SOLUTION INTRAVENOUS; SUBCUTANEOUS ONCE
Status: COMPLETED | OUTPATIENT
Start: 2021-08-09 | End: 2021-08-09

## 2021-08-09 RX ORDER — FENTANYL CITRATE 50 UG/ML
50 INJECTION, SOLUTION INTRAMUSCULAR; INTRAVENOUS
Status: DISCONTINUED | OUTPATIENT
Start: 2021-08-09 | End: 2021-08-09 | Stop reason: HOSPADM

## 2021-08-09 RX ORDER — LIDOCAINE HYDROCHLORIDE 10 MG/ML
INJECTION, SOLUTION INFILTRATION; PERINEURAL PRN
Status: DISCONTINUED | OUTPATIENT
Start: 2021-08-09 | End: 2021-08-09 | Stop reason: SDUPTHER

## 2021-08-09 RX ORDER — ONDANSETRON 2 MG/ML
INJECTION INTRAMUSCULAR; INTRAVENOUS PRN
Status: DISCONTINUED | OUTPATIENT
Start: 2021-08-09 | End: 2021-08-09 | Stop reason: SDUPTHER

## 2021-08-09 RX ORDER — SCOLOPAMINE TRANSDERMAL SYSTEM 1 MG/1
1 PATCH, EXTENDED RELEASE TRANSDERMAL ONCE
Status: DISCONTINUED | OUTPATIENT
Start: 2021-08-09 | End: 2021-08-09 | Stop reason: HOSPADM

## 2021-08-09 RX ORDER — PROMETHAZINE HYDROCHLORIDE 25 MG/ML
6.25 INJECTION, SOLUTION INTRAMUSCULAR; INTRAVENOUS
Status: DISCONTINUED | OUTPATIENT
Start: 2021-08-09 | End: 2021-08-09 | Stop reason: HOSPADM

## 2021-08-09 RX ORDER — MORPHINE SULFATE 4 MG/ML
2 INJECTION, SOLUTION INTRAMUSCULAR; INTRAVENOUS EVERY 5 MIN PRN
Status: DISCONTINUED | OUTPATIENT
Start: 2021-08-09 | End: 2021-08-09 | Stop reason: HOSPADM

## 2021-08-09 RX ORDER — HYDROMORPHONE HYDROCHLORIDE 1 MG/ML
0.5 INJECTION, SOLUTION INTRAMUSCULAR; INTRAVENOUS; SUBCUTANEOUS EVERY 5 MIN PRN
Status: DISCONTINUED | OUTPATIENT
Start: 2021-08-09 | End: 2021-08-09 | Stop reason: HOSPADM

## 2021-08-09 RX ORDER — SODIUM CHLORIDE 0.9 % (FLUSH) 0.9 %
10 SYRINGE (ML) INJECTION EVERY 12 HOURS SCHEDULED
Status: DISCONTINUED | OUTPATIENT
Start: 2021-08-09 | End: 2021-08-09 | Stop reason: HOSPADM

## 2021-08-09 RX ORDER — DEXAMETHASONE SODIUM PHOSPHATE 10 MG/ML
INJECTION, SOLUTION INTRAMUSCULAR; INTRAVENOUS PRN
Status: DISCONTINUED | OUTPATIENT
Start: 2021-08-09 | End: 2021-08-09 | Stop reason: SDUPTHER

## 2021-08-09 RX ORDER — DIPHENHYDRAMINE HYDROCHLORIDE 50 MG/ML
12.5 INJECTION INTRAMUSCULAR; INTRAVENOUS
Status: DISCONTINUED | OUTPATIENT
Start: 2021-08-09 | End: 2021-08-09 | Stop reason: HOSPADM

## 2021-08-09 RX ORDER — SODIUM CHLORIDE 0.9 % (FLUSH) 0.9 %
5-40 SYRINGE (ML) INJECTION PRN
Status: DISCONTINUED | OUTPATIENT
Start: 2021-08-09 | End: 2021-08-09 | Stop reason: HOSPADM

## 2021-08-09 RX ORDER — LABETALOL HYDROCHLORIDE 5 MG/ML
5 INJECTION, SOLUTION INTRAVENOUS EVERY 10 MIN PRN
Status: DISCONTINUED | OUTPATIENT
Start: 2021-08-09 | End: 2021-08-09 | Stop reason: HOSPADM

## 2021-08-09 RX ORDER — HYDROMORPHONE HYDROCHLORIDE 1 MG/ML
0.25 INJECTION, SOLUTION INTRAMUSCULAR; INTRAVENOUS; SUBCUTANEOUS EVERY 5 MIN PRN
Status: DISCONTINUED | OUTPATIENT
Start: 2021-08-09 | End: 2021-08-09 | Stop reason: HOSPADM

## 2021-08-09 RX ORDER — MEPERIDINE HYDROCHLORIDE 25 MG/ML
12.5 INJECTION INTRAMUSCULAR; INTRAVENOUS; SUBCUTANEOUS EVERY 5 MIN PRN
Status: DISCONTINUED | OUTPATIENT
Start: 2021-08-09 | End: 2021-08-09 | Stop reason: HOSPADM

## 2021-08-09 RX ORDER — HYDRALAZINE HYDROCHLORIDE 20 MG/ML
5 INJECTION INTRAMUSCULAR; INTRAVENOUS EVERY 10 MIN PRN
Status: DISCONTINUED | OUTPATIENT
Start: 2021-08-09 | End: 2021-08-09 | Stop reason: HOSPADM

## 2021-08-09 RX ORDER — PROPOFOL 10 MG/ML
INJECTION, EMULSION INTRAVENOUS PRN
Status: DISCONTINUED | OUTPATIENT
Start: 2021-08-09 | End: 2021-08-09 | Stop reason: SDUPTHER

## 2021-08-09 RX ORDER — MIDAZOLAM HYDROCHLORIDE 1 MG/ML
2 INJECTION INTRAMUSCULAR; INTRAVENOUS
Status: DISCONTINUED | OUTPATIENT
Start: 2021-08-09 | End: 2021-08-09 | Stop reason: HOSPADM

## 2021-08-09 RX ORDER — METOCLOPRAMIDE HYDROCHLORIDE 5 MG/ML
10 INJECTION INTRAMUSCULAR; INTRAVENOUS
Status: DISCONTINUED | OUTPATIENT
Start: 2021-08-09 | End: 2021-08-09 | Stop reason: HOSPADM

## 2021-08-09 RX ORDER — MIDAZOLAM HYDROCHLORIDE 1 MG/ML
INJECTION INTRAMUSCULAR; INTRAVENOUS PRN
Status: DISCONTINUED | OUTPATIENT
Start: 2021-08-09 | End: 2021-08-09 | Stop reason: SDUPTHER

## 2021-08-09 RX ORDER — MORPHINE SULFATE 4 MG/ML
4 INJECTION, SOLUTION INTRAMUSCULAR; INTRAVENOUS EVERY 5 MIN PRN
Status: DISCONTINUED | OUTPATIENT
Start: 2021-08-09 | End: 2021-08-09 | Stop reason: HOSPADM

## 2021-08-09 RX ORDER — FENTANYL CITRATE 50 UG/ML
INJECTION, SOLUTION INTRAMUSCULAR; INTRAVENOUS PRN
Status: DISCONTINUED | OUTPATIENT
Start: 2021-08-09 | End: 2021-08-09 | Stop reason: SDUPTHER

## 2021-08-09 RX ORDER — MORPHINE SULFATE 4 MG/ML
4 INJECTION, SOLUTION INTRAMUSCULAR; INTRAVENOUS
Status: DISCONTINUED | OUTPATIENT
Start: 2021-08-09 | End: 2021-08-09 | Stop reason: HOSPADM

## 2021-08-09 RX ADMIN — SODIUM CHLORIDE, SODIUM LACTATE, POTASSIUM CHLORIDE, AND CALCIUM CHLORIDE: 600; 310; 30; 20 INJECTION, SOLUTION INTRAVENOUS at 08:06

## 2021-08-09 RX ADMIN — ROCURONIUM BROMIDE 70 MG: 10 INJECTION, SOLUTION INTRAVENOUS at 09:46

## 2021-08-09 RX ADMIN — PROPOFOL 200 MG: 10 INJECTION, EMULSION INTRAVENOUS at 09:45

## 2021-08-09 RX ADMIN — DEXAMETHASONE SODIUM PHOSPHATE 10 MG: 10 INJECTION, SOLUTION INTRAMUSCULAR; INTRAVENOUS at 09:53

## 2021-08-09 RX ADMIN — FENTANYL CITRATE 50 MCG: 50 INJECTION, SOLUTION INTRAMUSCULAR; INTRAVENOUS at 10:18

## 2021-08-09 RX ADMIN — MORPHINE SULFATE 4 MG: 4 INJECTION, SOLUTION INTRAMUSCULAR; INTRAVENOUS at 12:10

## 2021-08-09 RX ADMIN — HEPARIN SODIUM 5000 UNITS: 5000 INJECTION INTRAVENOUS; SUBCUTANEOUS at 08:08

## 2021-08-09 RX ADMIN — OXYCODONE HYDROCHLORIDE AND ACETAMINOPHEN 1 TABLET: 5; 325 TABLET ORAL at 13:08

## 2021-08-09 RX ADMIN — MEPERIDINE HYDROCHLORIDE 12.5 MG: 25 INJECTION, SOLUTION INTRAMUSCULAR; INTRAVENOUS; SUBCUTANEOUS at 11:47

## 2021-08-09 RX ADMIN — MIDAZOLAM 2 MG: 1 INJECTION INTRAMUSCULAR; INTRAVENOUS at 09:40

## 2021-08-09 RX ADMIN — MORPHINE SULFATE 4 MG: 4 INJECTION, SOLUTION INTRAMUSCULAR; INTRAVENOUS at 11:32

## 2021-08-09 RX ADMIN — ONDANSETRON HYDROCHLORIDE 4 MG: 2 INJECTION, SOLUTION INTRAMUSCULAR; INTRAVENOUS at 10:51

## 2021-08-09 RX ADMIN — SUGAMMADEX 200 MG: 100 INJECTION, SOLUTION INTRAVENOUS at 11:00

## 2021-08-09 RX ADMIN — Medication 2000 MG: at 09:53

## 2021-08-09 RX ADMIN — LIDOCAINE HYDROCHLORIDE 50 MG: 10 INJECTION, SOLUTION INFILTRATION; PERINEURAL at 09:45

## 2021-08-09 RX ADMIN — FENTANYL CITRATE 100 MCG: 50 INJECTION, SOLUTION INTRAMUSCULAR; INTRAVENOUS at 09:43

## 2021-08-09 RX ADMIN — SODIUM CHLORIDE, SODIUM LACTATE, POTASSIUM CHLORIDE, AND CALCIUM CHLORIDE: 600; 310; 30; 20 INJECTION, SOLUTION INTRAVENOUS at 10:29

## 2021-08-09 ASSESSMENT — PAIN SCALES - GENERAL
PAINLEVEL_OUTOF10: 6
PAINLEVEL_OUTOF10: 8
PAINLEVEL_OUTOF10: 9
PAINLEVEL_OUTOF10: 8
PAINLEVEL_OUTOF10: 8

## 2021-08-09 ASSESSMENT — PAIN DESCRIPTION - LOCATION
LOCATION: ABDOMEN

## 2021-08-09 ASSESSMENT — PAIN DESCRIPTION - DESCRIPTORS
DESCRIPTORS: STABBING;SHARP
DESCRIPTORS: ACHING

## 2021-08-09 ASSESSMENT — PAIN DESCRIPTION - ORIENTATION
ORIENTATION: RIGHT
ORIENTATION: RIGHT

## 2021-08-09 ASSESSMENT — PAIN DESCRIPTION - FREQUENCY
FREQUENCY: CONTINUOUS
FREQUENCY: CONTINUOUS

## 2021-08-09 ASSESSMENT — PAIN DESCRIPTION - PAIN TYPE
TYPE: SURGICAL PAIN

## 2021-08-09 ASSESSMENT — ENCOUNTER SYMPTOMS: SHORTNESS OF BREATH: 0

## 2021-08-09 ASSESSMENT — LIFESTYLE VARIABLES: SMOKING_STATUS: 0

## 2021-08-09 NOTE — OP NOTE
Operative Note      Patient: Jose Sahu  YOB: 1955  MRN: [id number]    Date of Procedure: 8/9/2021    Pre-Op Diagnosis: INCARCERATED INCISIONAL HERNIA    Post-Op Diagnosis: Same       Procedure(s):  LAPAROSCOPIC ASSISTED ROBOTIC INCARCERATED INCISIONAL HERNIA REPAIR WITH MESH    Surgeon(s):  Kalie Love DO    Assistant:   First Assistant: Etienne Wallis    Anesthesia: General    Estimated Blood Loss (mL): Minimal    Complications: None    Specimens:   * No specimens in log *    Implants:  Implant Name Type Inv. Item Serial No.  Lot No. LRB No. Used Action   MESH SURG DIA4. 5IN CIR W/ ECHO 2 POS SYS VENTRALIGHT  MESH SURG DIA4. 5IN CIR W/ ECHO 2 POS SYS VENTRALIGHT  BARD DAVOL-WD [id number] N/A 1 Implanted         Drains: * No LDAs found *    Detailed Description of Procedure:   Mr. Melani Soares was taken to the main operating room and placed on the operating  table supine with the right side raised slightly on a bump and slightly jackknifed. After the induction of adequate general endotracheal anesthesia, the abdomen was prepped and draped to a sterile field with I-O band. A time-out was undertaken. A small incision was made two fingerbreadths below the costal margin on the right side after instilling local anesthesic, and through this an optiview trocar was inserted, the abdomen was entered, and a pneumoperitoneum created. A laparoscope was advanced and inspection undertaken. No evidence of trauma from the trocar site insertion was appreciated. The hernia was seen just to the left of the midline with overlying fat pads adherent in the area and incarcerated omentum in the defect. Two additional 8 mm ports were placed along the right anterior axillary line and just superior to the ASIS. The camera was moved to the middle port, and the subcostal incision was up sized to a 12 mm airseal port. The [facility] system was brought into the field and trocars were secured.   The camera was inserted and working instruments were advanced. Traction was placed on the incarcerated omentum and with traction and a small amount of cautery at the defect, the omentum was reduced. The Falciform ligament and the fat pads along the anterior abdominal wall were  from the fascia with cautery and a plane was created to place the mesh just beneath the peritoneum using cautery luciano. Once the abdominal fat pad was completely down, the hernia defects were apparent. The exposed a defects measured 2.5  x 3 cm. Thus, the selected mesh was Bard ECHO max 11 cm round mesh. The defect were approximated with 0-Stratifix suture in a running fashion, taking care to grasp the hernia sac but not the skin. The mesh was then inserted and pulled up through the center of the suture line. The mesh was circumferentially secured with Stratifix 00 six inch suture circumferentially. The retaining device was removed from the mesh and it laid smoothly against the abdominal wall. A ROBERT block was then performed via direct laparoscopic visualization bilaterally with Exparel. The operative site was then checked and hemostasis assured. The 12 mm port site fascial defect was closed with an 0-Vicryl suture laparoscopically. The remaining working ports were removed with no bleeding noted. The pneumoperitoneum was released and the camera port removed. The port locations were closed with interrupted 4-0 monocryl subcuticular suture followed by Dermabond. Sponge, needle, instrument counts correct on 2 occasions. Estimated intraoperative blood loss minimal.     Mr. Melani Soares tolerated her surgery well, and she was taken to PACU in satisfactory condition.     ________________________________  Tyshawn Fitzgerald DO      Electronically signed by Tyshawn Fitzgerald DO on 2/7/1545 at 11:02 AM

## 2021-08-09 NOTE — ANESTHESIA PRE PROCEDURE
Department of Anesthesiology  Preprocedure Note       Name:  Domi Bernal   Age:  73 y.o.  :  1955                                          MRN:  [id number]         Date:  2021      Surgeon: Melissa Bergeron):  Ellyn Gutierres DO    Procedure: Procedure(s):  LAPAROSCOPIC ASSISTED ROBOTIC INCISIONAL HERNIA REPAIR WITH MESH    Medications prior to admission:   Prior to Admission medications    Medication Sig Start Date End Date Taking? Authorizing Provider   potassium chloride (KLOR-CON M) 20 MEQ extended release tablet Take 20 mEq by mouth every other day   Yes Historical Provider, MD   buPROPion (WELLBUTRIN) 75 MG tablet Take 75 mg by mouth daily as needed   Yes Historical Provider, MD   gabapentin (NEURONTIN) 100 MG capsule Take 3 capsules by mouth 3 times daily for 90 days. 7/29/21 10/27/21 Yes EMILIA Morataya   LORazepam (ATIVAN) 1 MG tablet Take 1 tablet by mouth 2 times daily for 90 days. 7/29/21 10/27/21 Yes EMILIA Morataya   amLODIPine (NORVASC) 10 MG tablet Take 1 tablet by mouth daily 6/15/21 9/13/21 Yes EMILIA oMrataya   irbesartan (AVAPRO) 300 MG tablet Take 1 tablet by mouth nightly 6/15/21  Yes EMILIA Morataya   simvastatin (ZOCOR) 20 MG tablet Take 1 tablet by mouth nightly 21  Yes EMILIA Morataya   meclizine (ANTIVERT) 25 MG tablet Take 1 tablet by mouth 3 times daily as needed for Dizziness 21  Yes EMILIA Morataya   furosemide (LASIX) 20 MG tablet Take 1 tablet by mouth daily 10/26/20  Yes EMILIA Morataya   cloNIDine (CATAPRES) 0.1 MG tablet Take 1 tablet by mouth as needed for High Blood Pressure 18  Yes EMILIA Morataya   acetaminophen-codeine (TYLENOL #4) 300-60 MG per tablet Take 1 tablet by mouth 4 times daily as needed for Pain for up to 30 days.  6/15/21 7/29/21  EMILIA Morataya       Current medications:    Current Facility-Administered Medications   Medication Dose Route Frequency Provider Last Rate Last Admin    0.9 % sodium chloride infusion  25 mL Intravenous PRN Charley Veloz, DO        ceFAZolin (ANCEF) 2000 mg in 0.9% sodium chloride 50 mL IVPB  2,000 mg Intravenous On Call to [address], DO        lactated ringers infusion   Intravenous Continuous Charley Veloz,  mL/hr at 08/09/21 0806 New Bag at 08/09/21 0806    sodium chloride flush 0.9 % injection 10 mL  10 mL Intravenous 2 times per day Charley Veloz, DO        sodium chloride flush 0.9 % injection 10 mL  10 mL Intravenous PRN Charley Veloz, DO           Allergies: Allergies   Allergen Reactions    Nitroglycerin Other (See Comments)     Blood pressure problems, very bad, happened in [facility]. Blood pressure problems, very bad, happened in [facility]. Blood pressure problems, very bad, happened in [facility].        Problem List:    Patient Active Problem List   Diagnosis Code    Ventral hernia K43.9    S/P Nissen fundoplication (without gastrostomy tube) procedure Z98.890    Family history of gastric cancer M40.2    Helicobacter pylori (H. pylori) infection A04.8    Vertigo R42    Mixed hyperlipidemia E78.2    Chronic pain syndrome G89.4    Anxiety F41.9    Benign essential HTN I10    Hypokalemia E87.6    Memory loss R41.3    Peripheral polyneuropathy G62.9    Incisional hernia, incarcerated K43.0       Past Medical History:        Diagnosis Date    Anxiety and depression     Atypical chest pain     Elevated lipids     Gastritis     GERD (gastroesophageal reflux disease)     Hepatitis A     Hiatal hernia     History of kidney stones     HTN (hypertension)     Hyperlipidemia     Hypertension     Irregular Z line of esophagus     Libido, decreased     Migraines        Past Surgical History:        Procedure Laterality Date    CHOLECYSTECTOMY      [facility]    COLONOSCOPY      COLONOSCOPY  10-3-06    Dr Roque Kwan    COLONOSCOPY  46-21-34    Dr Angelita Avelar 04/08/2011    GFRAA >59 07/28/2021    LABGLOM >60 07/28/2021    GLUCOSE 92 07/28/2021    PROT 7.1 07/28/2021    PROT 6.5 10/23/2012    CALCIUM 9.5 07/28/2021    BILITOT 0.6 07/28/2021    ALKPHOS 87 07/28/2021    ALKPHOS 71 04/08/2011    AST 29 07/28/2021    ALT 36 07/28/2021       POC Tests: No results for input(s): POCGLU, POCNA, POCK, POCCL, POCBUN, POCHEMO, POCHCT in the last 72 hours. Coags:   Lab Results   Component Value Date    PROTIME 12.8 10/23/2012    PROTIME 10.20 04/08/2011    INR 1.00 10/23/2012       HCG (If Applicable): No results found for: PREGTESTUR, PREGSERUM, HCG, HCGQUANT     ABGs: No results found for: PHART, PO2ART, ZZG9BAD, EXO3TIR, BEART, A3PPDYED     Type & Screen (If Applicable):  No results found for: LABABO, LABRH    Drug/Infectious Status (If Applicable):  No results found for: HIV, HEPCAB    COVID-19 Screening (If Applicable):   Lab Results   Component Value Date    COVID19 DETECTED 08/22/2020           Anesthesia Evaluation  Patient summary reviewed and Nursing notes reviewed no history of anesthetic complications:   Airway: Mallampati: II  TM distance: >3 FB   Neck ROM: full  Mouth opening: > = 3 FB Dental: normal exam         Pulmonary:Negative Pulmonary ROS and normal exam  breath sounds clear to auscultation      (-) shortness of breath and not a current smoker          Patient did not smoke on day of surgery. Cardiovascular:    (+) hypertension:,     (-) CAD,  angina and  CHF    NYHA Classification: I  ECG reviewed  Rhythm: regular  Rate: normal           Beta Blocker:  Not on Beta Blocker         Neuro/Psych:   Negative Neuro/Psych ROS  (+) neuromuscular disease:, headaches: migraine headaches, psychiatric history:depression/anxiety    (-) seizures and CVA           GI/Hepatic/Renal: Neg GI/Hepatic/Renal ROS  (+) hiatal hernia, GERD:, hepatitis: B, liver disease:,           Endo/Other: Negative Endo/Other ROS             Pt had PAT visit.        Abdominal: (+) obese,     Abdomen: soft. Vascular: Other Findings:             Anesthesia Plan      general     ASA 3     (Iv zofran within 30 min of closing )  Induction: intravenous. BIS  MIPS: Postoperative opioids intended and Prophylactic antiemetics administered. Anesthetic plan and risks discussed with patient. Use of blood products discussed with patient whom. Plan discussed with CRNA.     Attending anesthesiologist reviewed and agrees with Pre Eval content              Kasi Collazo MD   8/9/2021

## 2021-08-09 NOTE — INTERVAL H&P NOTE
Update History & Physical    The patient's History and Physical of August 4, 2021 was reviewed with the patient and I examined the patient. There was no change. The surgical site was confirmed by the patient and me. Plan: The risks, benefits, expected outcome, and alternative to the recommended procedure have been discussed with the patient. Patient understands and wants to proceed with the procedure.      Electronically signed by Atif Zepeda DO on 2/8/3529 at 9:08 AM

## 2021-08-09 NOTE — ANESTHESIA POSTPROCEDURE EVALUATION
Department of Anesthesiology  Postprocedure Note    Patient: Colleen Belle  MRN: [id number]  YOB: 1955  Date of evaluation: 8/9/2021  Time:  11:17 AM     Procedure Summary     Date: 08/09/21 Room / Location: [address]    Anesthesia Start: 0940 Anesthesia Stop: 1115    Procedure: [address] WITH MESH (N/A ) Diagnosis: (INCARCERATED INCISIONAL HERNIA)    Surgeons: Ervin Gonzalez DO Responsible Provider: EMILIA Noel CRNA    Anesthesia Type: general ASA Status: 3          Anesthesia Type: general    Ronnell Phase I: Ronnell Score: 10    Ronnell Phase II:      Last vitals: Reviewed and per EMR flowsheets.        Anesthesia Post Evaluation    Patient location during evaluation: PACU  Patient participation: complete - patient participated  Level of consciousness: sleepy but conscious  Pain score: 0  Airway patency: patent  Nausea & Vomiting: no nausea and no vomiting  Complications: no  Cardiovascular status: hemodynamically stable  Respiratory status: acceptable and nasal cannula  Hydration status: stable

## 2021-08-20 ENCOUNTER — OFFICE VISIT (OUTPATIENT)
Dept: SURGERY | Age: 66
End: 2021-08-20

## 2021-08-20 VITALS
DIASTOLIC BLOOD PRESSURE: 78 MMHG | HEIGHT: 66 IN | BODY MASS INDEX: 33.59 KG/M2 | SYSTOLIC BLOOD PRESSURE: 124 MMHG | WEIGHT: 209 LBS | TEMPERATURE: 97.6 F

## 2021-08-20 DIAGNOSIS — Z09 POSTOPERATIVE EXAMINATION: Primary | ICD-10-CM

## 2021-08-20 PROCEDURE — 99024 POSTOP FOLLOW-UP VISIT: CPT | Performed by: SURGERY

## 2021-09-02 ENCOUNTER — PATIENT MESSAGE (OUTPATIENT)
Dept: INTERNAL MEDICINE | Age: 66
End: 2021-09-02

## 2021-09-02 DIAGNOSIS — F41.9 ANXIETY: ICD-10-CM

## 2021-09-02 DIAGNOSIS — K43.0 INCISIONAL HERNIA, INCARCERATED: ICD-10-CM

## 2021-09-02 DIAGNOSIS — G89.4 CHRONIC PAIN SYNDROME: ICD-10-CM

## 2021-09-02 RX ORDER — AMLODIPINE BESYLATE 10 MG/1
10 TABLET ORAL DAILY
Qty: 90 TABLET | Refills: 1 | Status: SHIPPED | OUTPATIENT
Start: 2021-09-02 | End: 2022-01-01 | Stop reason: SDUPTHER

## 2021-09-02 RX ORDER — FUROSEMIDE 20 MG/1
20 TABLET ORAL DAILY
Qty: 180 TABLET | Refills: 3 | Status: SHIPPED | OUTPATIENT
Start: 2021-09-02 | End: 2022-01-01 | Stop reason: SDUPTHER

## 2021-09-02 RX ORDER — LORAZEPAM 1 MG/1
1 TABLET ORAL 2 TIMES DAILY
Qty: 60 TABLET | Refills: 2 | Status: SHIPPED | OUTPATIENT
Start: 2021-09-02 | End: 2021-10-10 | Stop reason: SDUPTHER

## 2021-09-02 NOTE — TELEPHONE ENCOUNTER
Sherman Quiñones called to request a refill on his medication. Last office visit : 7/29/2021   Next office visit : 11/2/2021     Last UDS:   Amphetamine Screen, Urine   Date Value Ref Range Status   07/29/2021 NEG  Final     Barbiturate Screen, Urine   Date Value Ref Range Status   07/29/2021 NEG  Final     Benzodiazepine Screen, Urine   Date Value Ref Range Status   07/29/2021 NEG  Final     Buprenorphine Urine   Date Value Ref Range Status   07/29/2021 NEG  Final     Cocaine Metabolite Screen, Urine   Date Value Ref Range Status   07/29/2021 NEG  Final     Gabapentin Screen, Urine   Date Value Ref Range Status   07/29/2021 NEG  Final     MDMA, Urine   Date Value Ref Range Status   07/29/2021 NEG  Final     Methamphetamine, Urine   Date Value Ref Range Status   07/29/2021 NEG  Final     Opiate Scrn, Ur   Date Value Ref Range Status   07/29/2021 NEG  Final     Oxycodone Screen, Ur   Date Value Ref Range Status   07/29/2021 NEG  Final     PCP Screen, Urine   Date Value Ref Range Status   07/29/2021 NEG  Final     Propoxyphene Screen, Urine   Date Value Ref Range Status   07/29/2021 NEG  Final     THC Screen, Urine   Date Value Ref Range Status   07/29/2021 NEG  Final     Tricyclic Antidepressants, Urine   Date Value Ref Range Status   07/29/2021 NEG  Final       Last Isha Youngmarcela: 7/29/21  Medication Contract: 7/29/21    Requested Prescriptions     Pending Prescriptions Disp Refills    furosemide (LASIX) 20 MG tablet 180 tablet 3     Sig: Take 1 tablet by mouth daily    amLODIPine (NORVASC) 10 MG tablet 90 tablet 1     Sig: Take 1 tablet by mouth daily    LORazepam (ATIVAN) 1 MG tablet 60 tablet 2     Sig: Take 1 tablet by mouth 2 times daily for 90 days. Please approve or refuse this medication.    Dre Lemus MA

## 2021-09-03 NOTE — TELEPHONE ENCOUNTER
Colleen Belle called to request a refill on his medication. Last office visit : 7/29/2021   Next office visit : 11/2/2021     Last UDS:   Amphetamine Screen, Urine   Date Value Ref Range Status   07/29/2021 NEG  Final     Barbiturate Screen, Urine   Date Value Ref Range Status   07/29/2021 NEG  Final     Benzodiazepine Screen, Urine   Date Value Ref Range Status   07/29/2021 NEG  Final     Buprenorphine Urine   Date Value Ref Range Status   07/29/2021 NEG  Final     Cocaine Metabolite Screen, Urine   Date Value Ref Range Status   07/29/2021 NEG  Final     Gabapentin Screen, Urine   Date Value Ref Range Status   07/29/2021 NEG  Final     MDMA, Urine   Date Value Ref Range Status   07/29/2021 NEG  Final     Methamphetamine, Urine   Date Value Ref Range Status   07/29/2021 NEG  Final     Opiate Scrn, Ur   Date Value Ref Range Status   07/29/2021 NEG  Final     Oxycodone Screen, Ur   Date Value Ref Range Status   07/29/2021 NEG  Final     PCP Screen, Urine   Date Value Ref Range Status   07/29/2021 NEG  Final     Propoxyphene Screen, Urine   Date Value Ref Range Status   07/29/2021 NEG  Final     THC Screen, Urine   Date Value Ref Range Status   07/29/2021 NEG  Final     Tricyclic Antidepressants, Urine   Date Value Ref Range Status   07/29/2021 NEG  Final       Last Celester Adeel: 07/29/2021  Medication Contract: 07/29/2021    Requested Prescriptions     Pending Prescriptions Disp Refills    acetaminophen-codeine (TYLENOL #4) 300-60 MG per tablet 120 tablet 0     Sig: Take 1 tablet by mouth 4 times daily as needed for Pain for up to 30 days. Please approve or refuse this medication.    Yaquelin Mendez

## 2021-09-07 DIAGNOSIS — G89.4 CHRONIC PAIN SYNDROME: ICD-10-CM

## 2021-09-07 RX ORDER — ACETAMINOPHEN AND CODEINE PHOSPHATE 60; 300 MG/1; MG/1
1 TABLET ORAL 4 TIMES DAILY PRN
Qty: 120 TABLET | Refills: 0 | Status: SHIPPED | OUTPATIENT
Start: 2021-09-07 | End: 2021-10-11 | Stop reason: SDUPTHER

## 2021-09-07 RX ORDER — ACETAMINOPHEN AND CODEINE PHOSPHATE 60; 300 MG/1; MG/1
1 TABLET ORAL 4 TIMES DAILY PRN
Qty: 120 TABLET | Refills: 0 | OUTPATIENT
Start: 2021-09-07 | End: 2021-10-07

## 2021-09-07 RX ORDER — ACETAMINOPHEN AND CODEINE PHOSPHATE 60; 300 MG/1; MG/1
1 TABLET ORAL 4 TIMES DAILY PRN
Qty: 120 TABLET | Refills: 0 | Status: SHIPPED | OUTPATIENT
Start: 2021-09-07 | End: 2021-09-07 | Stop reason: SDUPTHER

## 2021-09-07 NOTE — TELEPHONE ENCOUNTER
Jose Luis Alberto called to request a refill on his medication. Last office visit : 7/29/2021   Next office visit : 11/2/2021     Last UDS:   Amphetamine Screen, Urine   Date Value Ref Range Status   07/29/2021 NEG  Final     Barbiturate Screen, Urine   Date Value Ref Range Status   07/29/2021 NEG  Final     Benzodiazepine Screen, Urine   Date Value Ref Range Status   07/29/2021 NEG  Final     Buprenorphine Urine   Date Value Ref Range Status   07/29/2021 NEG  Final     Cocaine Metabolite Screen, Urine   Date Value Ref Range Status   07/29/2021 NEG  Final     Gabapentin Screen, Urine   Date Value Ref Range Status   07/29/2021 NEG  Final     MDMA, Urine   Date Value Ref Range Status   07/29/2021 NEG  Final     Methamphetamine, Urine   Date Value Ref Range Status   07/29/2021 NEG  Final     Opiate Scrn, Ur   Date Value Ref Range Status   07/29/2021 NEG  Final     Oxycodone Screen, Ur   Date Value Ref Range Status   07/29/2021 NEG  Final     PCP Screen, Urine   Date Value Ref Range Status   07/29/2021 NEG  Final     Propoxyphene Screen, Urine   Date Value Ref Range Status   07/29/2021 NEG  Final     THC Screen, Urine   Date Value Ref Range Status   07/29/2021 NEG  Final     Tricyclic Antidepressants, Urine   Date Value Ref Range Status   07/29/2021 NEG  Final       Last Negrito Hernandez: 7/29/21  Medication Contract: 7/29/21    Requested Prescriptions     Pending Prescriptions Disp Refills    acetaminophen-codeine (TYLENOL #4) 300-60 MG per tablet 120 tablet 0     Sig: Take 1 tablet by mouth 4 times daily as needed for Pain for up to 30 days. Please approve or refuse this medication.    Fidel Barton MA

## 2021-09-17 ENCOUNTER — OFFICE VISIT (OUTPATIENT)
Dept: SURGERY | Age: 66
End: 2021-09-17

## 2021-09-17 VITALS
DIASTOLIC BLOOD PRESSURE: 74 MMHG | TEMPERATURE: 97.3 F | WEIGHT: 213 LBS | BODY MASS INDEX: 34.23 KG/M2 | SYSTOLIC BLOOD PRESSURE: 112 MMHG | HEIGHT: 66 IN

## 2021-09-17 DIAGNOSIS — Z09 POSTOPERATIVE EXAMINATION: Primary | ICD-10-CM

## 2021-09-17 PROCEDURE — 99024 POSTOP FOLLOW-UP VISIT: CPT | Performed by: SURGERY

## 2021-09-17 NOTE — PROGRESS NOTES
Postop Progress Note    Subjective    Marcell Ramachandran presents to the office for postop follow up 6 weeks/p Incisional hernia repair with mesh. He has resumed normal activity and is feeling very well. Objective    Vitals:    09/17/21 0924   BP: 112/74   Temp: 97.3 °F (36.3 °C)     General: alert, cooperative and no distress  Incision: healing well. No hernia appreciated on midline with valsalva. Assessment  Doing well postoperatively. Plan  1. Continue any current medications  2. Wound care discussed  3. Pt is to increase activities as tolerated  4. Usual diet  5.  Follow up: as needed    Electronically signed by DO Miky on 1/22/6861 at 9:31 AM

## 2021-10-06 NOTE — TELEPHONE ENCOUNTER
10/6/2021    HPI:  Chief complaint and history of present illness as per medical assistant/nurse documented today in the Flu/COVID-19 clinic. Patient is here with complaints of fever/chills, cough, chest/nasal congestion, chest tightness with cough, headache, sore throat and loss of smell/taste x 7 days. Patient states she has been in close contact with her roommate that has recently tested positive for covid. Patient states her roommate recently passed away from covid. Patient states on 9/27 she went to an Urgent Care and was tested for covid - states at that time she did not really have symptoms - states her test came back negative. Patient states her symptoms are improving. MEDICATIONS:  Prior to Visit Medications    Medication Sig Taking? Authorizing Provider   vitamin D (ERGOCALCIFEROL) 1.25 MG (06920 UT) CAPS capsule TAKE 1 CAPSULE BY MOUTH ONCE A WEEK FOR 4 DOSES  Ej Gallegos MD       Allergies   Allergen Reactions    Penicillins      Unknown reaction. Was told that she had a reaction when she was younger. ,   Past Medical History:   Diagnosis Date    Back pain     Headache(784.0)     Neck pain     Lots of pressure.  Reflux     Right upper extremity numbness     Spasm     severe generalized muscle spasms   ,   Past Surgical History:   Procedure Laterality Date    BACK SURGERY  2017    Dr. Dorie Titus: L4-S1 Laminectomy, Facetectomy, PosteroLateral Arthrodesis, Interbody      BLADDER SUSPENSION  early 2000's.  ENDOSCOPY, COLON, DIAGNOSTIC      OTHER SURGICAL HISTORY  5/22/12    right transposition of ulnar nerve.     OTHER SURGICAL HISTORY  [id number]    left cubital tunnel release    TUBAL LIGATION  1996    VEIN SURGERY  5/15/12    right leg   ,   Social History     Tobacco Use    Smoking status: Never Smoker    Smokeless tobacco: Never Used   Vaping Use    Vaping Use: Never used   Substance Use Topics    Alcohol use: No    Drug use: No   ,   Family History   Problem Relation Age From: Candy Milan  Sent: 9/3/2021 2:21 PM CDT  To: [facility] Clinical Staff  Subject: RE: Prescription Question    The pain medicine that I take is Acetaminophen-Codeine 300-60mg, Qty : 120 of Onset    Arthritis Father     Diabetes Father     Cancer Father     Other Father         DVT, on O2    Heart Disease Father     Other Mother         heavy smoker    Arthritis Sister     Other Brother         muscle spasms, Smoker, rotten teeth    Other Sister         anemia, Hx of DVT's    Other Son         complications from MVA    High Cholesterol Son     High Blood Pressure Son     Other Son         fatty liver, ADHD   ,   Immunization History   Administered Date(s) Administered    Tdap (Boostrix, Adacel) 09/19/2021   ,   Health Maintenance   Topic Date Due    Hepatitis C screen  Never done    Pneumococcal 0-64 years Vaccine (1 of 2 - PPSV23) Never done    Diabetic foot exam  Never done    Diabetic retinal exam  Never done    COVID-19 Vaccine (1) Never done    Diabetic microalbuminuria test  Never done    Hepatitis B vaccine (1 of 3 - Risk 3-dose series) Never done    Cervical cancer screen  Never done    Colon cancer screen colonoscopy  Never done    Flu vaccine (1) Never done    A1C test (Diabetic or Prediabetic)  03/25/2022    Lipid screen  03/25/2022    DTaP/Tdap/Td vaccine (2 - Td or Tdap) 09/19/2031    HIV screen  Completed    Hepatitis A vaccine  Aged Out    Hib vaccine  Aged Out    Meningococcal (ACWY) vaccine  Aged Out       PHYSICAL EXAM:  Physical Exam  Constitutional:       Appearance: Normal appearance. HENT:      Head: Normocephalic. Right Ear: Tympanic membrane, ear canal and external ear normal.      Left Ear: Tympanic membrane, ear canal and external ear normal.      Nose: Nose normal.      Mouth/Throat:      Lips: Pink. Mouth: Mucous membranes are moist.      Pharynx: Oropharynx is clear. Cardiovascular:      Rate and Rhythm: Normal rate and regular rhythm. Heart sounds: Normal heart sounds. Pulmonary:      Effort: Pulmonary effort is normal.      Breath sounds: Normal breath sounds. Musculoskeletal:      Cervical back: Neck supple.

## 2021-10-10 DIAGNOSIS — E78.2 MIXED HYPERLIPIDEMIA: Primary | ICD-10-CM

## 2021-10-10 DIAGNOSIS — F41.9 ANXIETY: ICD-10-CM

## 2021-10-10 DIAGNOSIS — R42 VERTIGO: ICD-10-CM

## 2021-10-11 DIAGNOSIS — G89.4 CHRONIC PAIN SYNDROME: ICD-10-CM

## 2021-10-11 RX ORDER — MECLIZINE HYDROCHLORIDE 25 MG/1
25 TABLET ORAL 3 TIMES DAILY PRN
Qty: 270 TABLET | Refills: 1 | Status: SHIPPED | OUTPATIENT
Start: 2021-10-11 | End: 2022-01-01 | Stop reason: SDUPTHER

## 2021-10-11 RX ORDER — LORAZEPAM 1 MG/1
1 TABLET ORAL 2 TIMES DAILY
Qty: 60 TABLET | Refills: 2 | Status: SHIPPED | OUTPATIENT
Start: 2021-10-11 | End: 2021-01-01 | Stop reason: SDUPTHER

## 2021-10-11 RX ORDER — SIMVASTATIN 20 MG
20 TABLET ORAL NIGHTLY
Qty: 90 TABLET | Refills: 3 | Status: SHIPPED | OUTPATIENT
Start: 2021-10-11 | End: 2022-01-01 | Stop reason: SDUPTHER

## 2021-10-11 RX ORDER — ACETAMINOPHEN AND CODEINE PHOSPHATE 60; 300 MG/1; MG/1
1 TABLET ORAL 4 TIMES DAILY PRN
Qty: 120 TABLET | Refills: 0 | Status: SHIPPED | OUTPATIENT
Start: 2021-10-11 | End: 2022-01-01 | Stop reason: SDUPTHER

## 2021-10-11 NOTE — TELEPHONE ENCOUNTER
Madhav Chu called to request a refill on his medication. Last office visit : 7/29/2021   Next office visit : 11/2/2021     Last UDS:   Amphetamine Screen, Urine   Date Value Ref Range Status   07/29/2021 NEG  Final     Barbiturate Screen, Urine   Date Value Ref Range Status   07/29/2021 NEG  Final     Benzodiazepine Screen, Urine   Date Value Ref Range Status   07/29/2021 NEG  Final     Buprenorphine Urine   Date Value Ref Range Status   07/29/2021 NEG  Final     Cocaine Metabolite Screen, Urine   Date Value Ref Range Status   07/29/2021 NEG  Final     Gabapentin Screen, Urine   Date Value Ref Range Status   07/29/2021 NEG  Final     MDMA, Urine   Date Value Ref Range Status   07/29/2021 NEG  Final     Methamphetamine, Urine   Date Value Ref Range Status   07/29/2021 NEG  Final     Opiate Scrn, Ur   Date Value Ref Range Status   07/29/2021 NEG  Final     Oxycodone Screen, Ur   Date Value Ref Range Status   07/29/2021 NEG  Final     PCP Screen, Urine   Date Value Ref Range Status   07/29/2021 NEG  Final     Propoxyphene Screen, Urine   Date Value Ref Range Status   07/29/2021 NEG  Final     THC Screen, Urine   Date Value Ref Range Status   07/29/2021 NEG  Final     Tricyclic Antidepressants, Urine   Date Value Ref Range Status   07/29/2021 NEG  Final       [place] 7/29/21  Drug Contract 7/29/21  UDS 7/29/2021  Last Fill: 9/02/21    Requested Prescriptions     Pending Prescriptions Disp Refills    meclizine (ANTIVERT) 25 MG tablet 270 tablet 1     Sig: Take 1 tablet by mouth 3 times daily as needed for Dizziness    simvastatin (ZOCOR) 20 MG tablet 90 tablet 3     Sig: Take 1 tablet by mouth nightly    LORazepam (ATIVAN) 1 MG tablet 60 tablet 2     Sig: Take 1 tablet by mouth 2 times daily for 90 days. Please approve or refuse this medication.    Liliana Laird MA

## 2021-11-01 NOTE — PROGRESS NOTES
[facility] PHYSICIAN SERVICES  [facility] INTERNAL MEDICINE  [id number] [facility] [address]  [address] [address]  Dept: [phone]  Dept Fax: [phone]: [phone]    Yg Rodriguez (:  1955) is a 73 y.o. male,Established patient, here for evaluation of the following chief complaint(s): 3 Month Follow-Up and Hypertension      Yg Rodriguez is a 73 y.o. male who presents today for his medical conditions/complaints as noted below. Yg Rodriguez is c/kait 3 Month Follow-Up and Hypertension        HPI:     Chief Complaint   Patient presents with    3 Month Follow-Up    Hypertension     HPI   #1 hypertension stable with as needed clonidine amlodipine 5 mg 10 Lasix 20 and irbesartan 300  #2 peripheral neuropathy he is stable on 300 mg of gabapentin 3 times daily  3. Hyperlipidemia stable with Zocor 20 daily no side effects of meds he takes as directed  #4 anxiety stable on current dose of lorazepam no side effects of the meds takes as directed  #5 chronic pain syndrome he is stable on Tylenol No. 4 4 times a day as needed. Controlled Substance Monitoring:    Acute and Chronic Pain Monitoring:   RX Monitoring 2021   Attestation The Prescription Monitoring Report for this patient was reviewed today. Periodic Controlled Substance Monitoring Possible medication side effects, risk of tolerance/dependence & alternative treatments discussed. ;No signs of potential drug abuse or diversion identified. Chronic Pain > 50 MEDD Re-evaluated the status of the patient's underlying condition causing pain.        CONTROLLED SUBSTANCE  Drug lorazepam 1 mg, gabapentin 300 mg, Tylenol No. 4  Diagnosis anxiety, peripheral neuropathy, chronic back and knee pain  Last Alexis Hensen 10/29/2021  Last UDS 2021  Pain contract last date 2021  Effective dose   yes      Changes this visit   none       Past Medical History:   Diagnosis Date    Anxiety and depression     Atypical chest pain     Elevated lipids     Gastritis     GERD (gastroesophageal reflux disease)     Hepatitis A     Hiatal hernia     History of kidney stones     HTN (hypertension)     Hyperlipidemia     Hypertension     Irregular Z line of esophagus     Libido, decreased     Migraines       Past Surgical History:   Procedure Laterality Date    CHOLECYSTECTOMY      [facility]    COLONOSCOPY      COLONOSCOPY  10-3-06    Dr Nubia Mandel    COLONOSCOPY  68-90-94    Dr Akanksha Mason N/A 8/9/2021    Sheela Benites performed by Dixie Alvarez DO at [facility] ARTHROSCOPY Bilateral     KNEE SURGERY Bilateral     2 X'S ON LEFT ONCE ON THE RIGHT    UPPER GASTROINTESTINAL ENDOSCOPY  11-     [facility]    UPPER GASTROINTESTINAL ENDOSCOPY      UPPER GASTROINTESTINAL ENDOSCOPY  8-30-01    Dr Fransisco Cooper  10-10-06    Dr Fransisco Cooper  11-18-10    Dr Lynne Dobbins 11/2/2021 9/17/2021 8/20/2021 8/9/2021 8/9/2021 3/1/1728   SYSTOLIC 372 986 526 - - -   DIASTOLIC 68 74 78 - - -   Site - Right Upper Arm Right Upper Arm - - -   Position - Sitting Sitting - - -   Cuff Size - Large Adult Large Adult - - -   Pulse 80 - - - - -   Temp 98.1 97.3 97.6 97 - -   Resp - - - - - -   SpO2 99 - - - 100 100   Weight 210 lb 213 lb 209 lb - - -   Height 5' 6\" 5' 6\" 5' 6\" - - -   Body mass index 33.89 kg/m2 34.38 kg/m2 33.73 kg/m2 - - -   Some recent data might be hidden       Family History   Problem Relation Age of Onset    Heart Disease Father     Heart Disease Sister     Heart Disease Brother     Stomach Cancer Mother     Cancer Sister         hodgkins    Cancer Mother        Social History     Tobacco Use    Smoking status: Never Smoker    Smokeless tobacco: Never Used 10/14/2029    Colon cancer screen colonoscopy  09/07/2031    COVID-19 Vaccine  Completed    Hepatitis C screen  Completed    HIV screen  Completed    Hepatitis A vaccine  Aged Out    Hepatitis B vaccine  Aged Out    Hib vaccine  Aged Out    Meningococcal (ACWY) vaccine  Aged Out       No results found for: LABA1C  Lab Results   Component Value Date    PSA 3.50 08/19/2020    PSA 1.71 01/14/2019     TSH   Date Value Ref Range Status   07/28/2021 1.430 0.270 - 4.200 uIU/mL Final   ]  Lab Results   Component Value Date     07/28/2021    K 3.8 07/28/2021     07/28/2021    CO2 30 (H) 07/28/2021    BUN 10 07/28/2021    CREATININE 0.8 07/28/2021    GLUCOSE 92 07/28/2021    CALCIUM 9.5 07/28/2021    PROT 7.1 07/28/2021    LABALBU 4.3 07/28/2021    BILITOT 0.6 07/28/2021    ALKPHOS 87 07/28/2021    AST 29 07/28/2021    ALT 36 07/28/2021    LABGLOM >60 07/28/2021    GFRAA >59 07/28/2021     Lab Results   Component Value Date    CHOL 187 07/28/2021    CHOL 132 (L) 08/19/2020    CHOL 220 (H) 01/14/2019     Lab Results   Component Value Date    TRIG 117 07/29/2021    TRIG 340 (H) 07/28/2021    TRIG 146 08/19/2020     Lab Results   Component Value Date    HDL 72 07/28/2021    HDL 40 (L) 08/19/2020    HDL 87 01/14/2019     Lab Results   Component Value Date    LDLCALC 47 07/28/2021    LDLCALC 63 08/19/2020    LDLCALC 80 01/14/2019     Lab Results   Component Value Date     07/28/2021     04/08/2011    K 3.8 07/28/2021    K 4.2 04/08/2011     07/28/2021     04/08/2011    CO2 30 07/28/2021    BUN 10 07/28/2021    CREATININE 0.8 07/28/2021    CREATININE 1.0 04/08/2011    GLUCOSE 92 07/28/2021    CALCIUM 9.5 07/28/2021      Lab Results   Component Value Date    WBC 7.1 07/28/2021    HGB 15.6 07/28/2021    HCT 45.3 07/28/2021    .7 (H) 07/28/2021     07/28/2021    LABLYMP 2.15 02/11/2015    LYMPHOPCT 39.6 07/28/2021    RBC 4.50 (L) 07/28/2021    MCH 34.7 (H) 07/28/2021    MCHC 34.4 07/28/2021    RDW 13.5 07/28/2021     Lab Results   Component Value Date    VITD25 83.1 07/28/2021         Subjective:      Review of Systems   Constitutional: Negative for fatigue, fever and unexpected weight change. HENT: Negative for ear discharge, ear pain, mouth sores, sore throat and trouble swallowing. Eyes: Negative for discharge, itching and visual disturbance. Respiratory: Negative for cough, choking, shortness of breath, wheezing and stridor. Cardiovascular: Negative for chest pain, palpitations and leg swelling. Gastrointestinal: Negative for abdominal distention, abdominal pain, blood in stool, constipation, diarrhea, nausea and vomiting. Endocrine: Negative for cold intolerance, polydipsia and polyuria. Genitourinary: Negative for difficulty urinating, dysuria, frequency and urgency. Musculoskeletal: Positive for arthralgias. Negative for gait problem. Skin: Negative for color change and rash. Allergic/Immunologic: Negative for food allergies and immunocompromised state. Neurological: Negative for dizziness, tremors, syncope, speech difficulty, weakness and headaches. Hematological: Negative for adenopathy. Does not bruise/bleed easily. Psychiatric/Behavioral: Negative for confusion and hallucinations. The patient is nervous/anxious. Objective:     Physical Exam  Constitutional:       General: He is not in acute distress. Appearance: He is well-developed. HENT:      Head: Normocephalic and atraumatic. Eyes:      General: No scleral icterus. Right eye: No discharge. Left eye: No discharge. Pupils: Pupils are equal, round, and reactive to light. Neck:      Thyroid: No thyromegaly. Vascular: No JVD. Cardiovascular:      Rate and Rhythm: Normal rate and regular rhythm. Heart sounds: Normal heart sounds. No murmur heard. Pulmonary:      Effort: Pulmonary effort is normal. No respiratory distress.       Breath sounds: Normal breath sounds. No wheezing or rales. Abdominal:      General: Bowel sounds are normal. There is no distension. Palpations: Abdomen is soft. There is no mass. Tenderness: There is no abdominal tenderness. There is no guarding or rebound. Musculoskeletal:         General: No tenderness. Normal range of motion. Cervical back: Normal range of motion and neck supple. Skin:     General: Skin is warm and dry. Findings: No erythema or rash. Neurological:      Mental Status: He is alert and oriented to person, place, and time. Cranial Nerves: No cranial nerve deficit. Coordination: Coordination normal.      Deep Tendon Reflexes: Reflexes are normal and symmetric. Reflexes normal.   Psychiatric:         Mood and Affect: Mood is not depressed. Behavior: Behavior normal.         Thought Content: Thought content normal.         Judgment: Judgment normal.       /68   Pulse 80   Temp 98.1 °F (36.7 °C)   Ht 5' 6\" (1.676 m)   Wt 210 lb (95.3 kg)   SpO2 99%   BMI 33.89 kg/m²           Assessment:      Problem List     Anxiety     Stable with current dose of lorazepam 1 mg 1 twice daily as needed          Relevant Medications    buPROPion (WELLBUTRIN) 75 MG tablet    LORazepam (ATIVAN) 1 MG tablet    Benign essential HTN     Stable with losartan 100 Lasix 20 amlodipine 5 daily has as needed clonidine          Chronic pain syndrome     He is stable on call #4 4 times a day.           Relevant Medications    buPROPion (WELLBUTRIN) 75 MG tablet    acetaminophen-codeine (TYLENOL #4) 300-60 MG per tablet    gabapentin (NEURONTIN) 300 MG capsule    Mixed hyperlipidemia     Stable with Zocor 20 daily          Relevant Medications    cloNIDine (CATAPRES) 0.1 MG tablet    irbesartan (AVAPRO) 300 MG tablet    furosemide (LASIX) 20 MG tablet    amLODIPine (NORVASC) 10 MG tablet    simvastatin (ZOCOR) 20 MG tablet    Peripheral polyneuropathy     He is stable with gabapentin for 300 mg 3 times a day          Relevant Medications    buPROPion (WELLBUTRIN) 75 MG tablet    LORazepam (ATIVAN) 1 MG tablet    gabapentin (NEURONTIN) 300 MG capsule          Plan:        Patient given educational materials - see patient instructions. Discussed use, benefit, and side effects of prescribed medications. Allpatient questions answered. Pt voiced understanding. Reviewed health maintenance. Instructed to continue current medications, diet and exercise. Patient agreed with treatment plan. Follow up as directed. MEDICATIONS:  Orders Placed This Encounter   Medications    gabapentin (NEURONTIN) 300 MG capsule     Sig: Take 1 capsule by mouth 3 times daily for 180 days. Dispense:  270 capsule     Refill:  1         ORDERS:  No orders of the defined types were placed in this encounter. Follow-up:  Return in about 3 months (around 2/2/2022) for have labs done prior to appt. PATIENT INSTRUCTIONS:  Patient Instructions   1. Hypertension stable on current meds no changes are necessary  2. Peripheral neuropathy restart gabapentin 303 times a day  3. Hyperlipidemia stable with Zocor  4. Anxiety stable with lorazepam 1 mg twice daily as needed  5. Chronic pain syndrome stable with home #4 4 times a day as needed    Electronically signed by EMILIA Delarosa on 11/2/2021 at 9:12 AM    @On this date 11/2/2021 I have spent 24 minutes reviewing previous notes, test results and face to face with the patient discussing the diagnosis and importance of compliance with the treatment plan as well as documenting on the day of the visit. EMRDragon/transcription disclaimer:  Much of this encounter note is electronic transcription/translation of spoken language to printed texts. The electronic translation of spoken language may be erroneous, or at times,nonsensical words or phrases may be inadvertently transcribed.   Although I have reviewed the note for such errors, some may still exist.

## 2021-11-02 NOTE — PATIENT INSTRUCTIONS
1.  Hypertension stable on current meds no changes are necessary  2. Peripheral neuropathy restart gabapentin 303 times a day  3. Hyperlipidemia stable with Zocor  4. Anxiety stable with lorazepam 1 mg twice daily as needed  5.   Chronic pain syndrome stable with home #4 4 times a day as needed

## 2021-12-21 NOTE — TELEPHONE ENCOUNTER
Jen called requesting a refill of the below medication which has been pended for you:     Requested Prescriptions     Pending Prescriptions Disp Refills    LORazepam (ATIVAN) 1 MG tablet 60 tablet 2     Sig: Take 1 tablet by mouth 2 times daily for 90 days. Last Appointment Date: 11/2/2021  Next Appointment Date: 2/2/2022    Allergies   Allergen Reactions    Nitroglycerin Other (See Comments)     Blood pressure problems, very bad, happened in [facility]. Blood pressure problems, very bad, happened in [facility]. Blood pressure problems, very bad, happened in [facility].

## 2022-01-01 ENCOUNTER — APPOINTMENT (OUTPATIENT)
Dept: GENERAL RADIOLOGY | Age: 67
DRG: 871 | End: 2022-01-01
Payer: MEDICARE

## 2022-01-01 ENCOUNTER — OFFICE VISIT (OUTPATIENT)
Dept: INTERNAL MEDICINE | Age: 67
End: 2022-01-01
Payer: MEDICARE

## 2022-01-01 ENCOUNTER — HOSPITAL ENCOUNTER (OUTPATIENT)
Dept: INFUSION THERAPY | Age: 67
Discharge: HOME | End: 2022-06-28
Payer: MEDICARE

## 2022-01-01 ENCOUNTER — APPOINTMENT (OUTPATIENT)
Dept: CT IMAGING | Age: 67
DRG: 871 | End: 2022-01-01
Payer: MEDICARE

## 2022-01-01 ENCOUNTER — OFFICE VISIT (OUTPATIENT)
Dept: HEMATOLOGY | Age: 67
End: 2022-01-01
Payer: MEDICARE

## 2022-01-01 ENCOUNTER — APPOINTMENT (OUTPATIENT)
Dept: NUCLEAR MEDICINE | Age: 67
DRG: 871 | End: 2022-01-01
Payer: MEDICARE

## 2022-01-01 ENCOUNTER — HOSPITAL ENCOUNTER (INPATIENT)
Age: 67
LOS: 1 days | Discharge: HOSPICE | DRG: 871 | End: 2022-10-22
Attending: PEDIATRICS | Admitting: INTERNAL MEDICINE
Payer: MEDICARE

## 2022-01-01 ENCOUNTER — HOSPITAL ENCOUNTER (OUTPATIENT)
Dept: INFUSION THERAPY | Age: 67
Discharge: HOME | DRG: 871 | End: 2022-10-18
Payer: MEDICARE

## 2022-01-01 VITALS
SYSTOLIC BLOOD PRESSURE: 112 MMHG | HEIGHT: 66 IN | SYSTOLIC BLOOD PRESSURE: 120 MMHG | WEIGHT: 215 LBS | BODY MASS INDEX: 34.55 KG/M2 | BODY MASS INDEX: 35.15 KG/M2 | WEIGHT: 218.7 LBS | OXYGEN SATURATION: 94 % | DIASTOLIC BLOOD PRESSURE: 72 MMHG | HEART RATE: 76 BPM | DIASTOLIC BLOOD PRESSURE: 70 MMHG | OXYGEN SATURATION: 98 % | HEIGHT: 66 IN | HEART RATE: 79 BPM

## 2022-01-01 VITALS
SYSTOLIC BLOOD PRESSURE: 91 MMHG | DIASTOLIC BLOOD PRESSURE: 49 MMHG | HEIGHT: 66 IN | WEIGHT: 215 LBS | BODY MASS INDEX: 34.55 KG/M2 | HEART RATE: 80 BPM | RESPIRATION RATE: 26 BRPM | OXYGEN SATURATION: 96 % | TEMPERATURE: 99 F

## 2022-01-01 VITALS
WEIGHT: 214.1 LBS | HEIGHT: 66 IN | HEART RATE: 81 BPM | BODY MASS INDEX: 34.41 KG/M2 | SYSTOLIC BLOOD PRESSURE: 136 MMHG | DIASTOLIC BLOOD PRESSURE: 76 MMHG | OXYGEN SATURATION: 98 %

## 2022-01-01 VITALS
DIASTOLIC BLOOD PRESSURE: 78 MMHG | OXYGEN SATURATION: 98 % | WEIGHT: 215 LBS | HEART RATE: 68 BPM | SYSTOLIC BLOOD PRESSURE: 128 MMHG | BODY MASS INDEX: 34.55 KG/M2 | HEIGHT: 66 IN

## 2022-01-01 VITALS
HEIGHT: 66 IN | OXYGEN SATURATION: 98 % | BODY MASS INDEX: 34.76 KG/M2 | WEIGHT: 216.3 LBS | HEART RATE: 65 BPM | SYSTOLIC BLOOD PRESSURE: 120 MMHG | DIASTOLIC BLOOD PRESSURE: 82 MMHG

## 2022-01-01 DIAGNOSIS — G62.9 PERIPHERAL POLYNEUROPATHY: ICD-10-CM

## 2022-01-01 DIAGNOSIS — R65.21 SEPSIS WITH ACUTE RENAL FAILURE AND SEPTIC SHOCK, DUE TO UNSPECIFIED ORGANISM, UNSPECIFIED ACUTE RENAL FAILURE TYPE (HCC): ICD-10-CM

## 2022-01-01 DIAGNOSIS — A41.9 SEPSIS WITH ACUTE RENAL FAILURE AND SEPTIC SHOCK, DUE TO UNSPECIFIED ORGANISM, UNSPECIFIED ACUTE RENAL FAILURE TYPE (HCC): ICD-10-CM

## 2022-01-01 DIAGNOSIS — R41.3 MEMORY LOSS: ICD-10-CM

## 2022-01-01 DIAGNOSIS — R42 VERTIGO: ICD-10-CM

## 2022-01-01 DIAGNOSIS — E78.2 MIXED HYPERLIPIDEMIA: ICD-10-CM

## 2022-01-01 DIAGNOSIS — F33.41 RECURRENT MAJOR DEPRESSIVE DISORDER, IN PARTIAL REMISSION (HCC): ICD-10-CM

## 2022-01-01 DIAGNOSIS — J18.9 PNEUMONIA OF BOTH LUNGS DUE TO INFECTIOUS ORGANISM, UNSPECIFIED PART OF LUNG: ICD-10-CM

## 2022-01-01 DIAGNOSIS — Z00.00 MEDICARE ANNUAL WELLNESS VISIT, SUBSEQUENT: ICD-10-CM

## 2022-01-01 DIAGNOSIS — F41.9 ANXIETY: ICD-10-CM

## 2022-01-01 DIAGNOSIS — C61 PROSTATE CANCER (HCC): ICD-10-CM

## 2022-01-01 DIAGNOSIS — R09.02 HYPOXEMIA: ICD-10-CM

## 2022-01-01 DIAGNOSIS — Z12.5 ENCOUNTER FOR PROSTATE CANCER SCREENING: ICD-10-CM

## 2022-01-01 DIAGNOSIS — I10 BENIGN ESSENTIAL HTN: ICD-10-CM

## 2022-01-01 DIAGNOSIS — G89.4 CHRONIC PAIN SYNDROME: ICD-10-CM

## 2022-01-01 DIAGNOSIS — C61 PROSTATE CANCER (HCC): Primary | ICD-10-CM

## 2022-01-01 DIAGNOSIS — E55.9 VITAMIN D DEFICIENCY: Primary | ICD-10-CM

## 2022-01-01 DIAGNOSIS — E66.01 SEVERE OBESITY (BMI 35.0-35.9 WITH COMORBIDITY) (HCC): ICD-10-CM

## 2022-01-01 DIAGNOSIS — E78.2 MIXED HYPERLIPIDEMIA: Primary | ICD-10-CM

## 2022-01-01 DIAGNOSIS — R97.20 ELEVATED PSA: ICD-10-CM

## 2022-01-01 DIAGNOSIS — I95.9 HYPOTENSION, UNSPECIFIED HYPOTENSION TYPE: ICD-10-CM

## 2022-01-01 DIAGNOSIS — N17.9 ACUTE RENAL FAILURE, UNSPECIFIED ACUTE RENAL FAILURE TYPE (HCC): ICD-10-CM

## 2022-01-01 DIAGNOSIS — K43.0 INCISIONAL HERNIA, INCARCERATED: ICD-10-CM

## 2022-01-01 DIAGNOSIS — R41.0 DELIRIUM: Primary | ICD-10-CM

## 2022-01-01 DIAGNOSIS — N17.9 SEPSIS WITH ACUTE RENAL FAILURE AND SEPTIC SHOCK, DUE TO UNSPECIFIED ORGANISM, UNSPECIFIED ACUTE RENAL FAILURE TYPE (HCC): ICD-10-CM

## 2022-01-01 DIAGNOSIS — G89.4 CHRONIC PAIN SYNDROME: Primary | ICD-10-CM

## 2022-01-01 LAB
ADENOVIRUS BY PCR: NOT DETECTED
ALBUMIN SERPL-MCNC: 3.4 G/DL (ref 3.5–5.2)
ALBUMIN SERPL-MCNC: 4.2 G/DL (ref 3.5–5.2)
ALBUMIN SERPL-MCNC: 4.7 G/DL (ref 3.5–5.2)
ALLENS TEST: ABNORMAL
ALP BLD-CCNC: 100 U/L (ref 40–130)
ALP BLD-CCNC: 71 U/L (ref 40–130)
ALP BLD-CCNC: 91 U/L (ref 40–130)
ALT SERPL-CCNC: 20 U/L (ref 5–41)
ALT SERPL-CCNC: 27 U/L (ref 5–41)
ALT SERPL-CCNC: 28 U/L (ref 5–41)
ANION GAP SERPL CALCULATED.3IONS-SCNC: 12 MMOL/L (ref 7–19)
ANION GAP SERPL CALCULATED.3IONS-SCNC: 16 MMOL/L (ref 7–19)
ANION GAP SERPL CALCULATED.3IONS-SCNC: 17 MMOL/L (ref 7–19)
AST SERPL-CCNC: 12 U/L (ref 5–40)
AST SERPL-CCNC: 21 U/L (ref 5–40)
AST SERPL-CCNC: 9 U/L (ref 5–40)
BACTERIA: NEGATIVE /HPF
BASE EXCESS ARTERIAL: -12.7 MMOL/L (ref -2–2)
BASE EXCESS ARTERIAL: -2.5 MMOL/L (ref -2–2)
BASE EXCESS ARTERIAL: -4 MMOL/L (ref -2–2)
BASE EXCESS ARTERIAL: -7 MMOL/L (ref -2–2)
BASE EXCESS ARTERIAL: -7.9 MMOL/L (ref -2–2)
BASOPHILS ABSOLUTE: 0 K/UL (ref 0–0.2)
BASOPHILS ABSOLUTE: 0.03 K/UL (ref 0.01–0.08)
BASOPHILS ABSOLUTE: 0.04 K/UL (ref 0.01–0.08)
BASOPHILS RELATIVE PERCENT: 0.1 % (ref 0–1)
BASOPHILS RELATIVE PERCENT: 0.2 % (ref 0–1)
BASOPHILS RELATIVE PERCENT: 0.5 % (ref 0–1)
BASOPHILS RELATIVE PERCENT: 0.6 % (ref 0.1–1.2)
BASOPHILS RELATIVE PERCENT: 0.7 % (ref 0.1–1.2)
BILIRUB SERPL-MCNC: 0.3 MG/DL (ref 0.2–1.2)
BILIRUB SERPL-MCNC: 0.4 MG/DL (ref 0.2–1.2)
BILIRUB SERPL-MCNC: 0.6 MG/DL (ref 0.2–1.2)
BILIRUBIN URINE: NEGATIVE
BLOOD, URINE: NEGATIVE
BORDETELLA PARAPERTUSSIS BY PCR: NOT DETECTED
BORDETELLA PERTUSSIS BY PCR: NOT DETECTED
BUN BLDV-MCNC: 11 MG/DL (ref 8–23)
BUN BLDV-MCNC: 45 MG/DL (ref 8–23)
BUN BLDV-MCNC: 51 MG/DL (ref 8–23)
CALCIUM SERPL-MCNC: 8 MG/DL (ref 8.8–10.2)
CALCIUM SERPL-MCNC: 9.5 MG/DL (ref 8.8–10.2)
CALCIUM SERPL-MCNC: 9.6 MG/DL (ref 8.8–10.2)
CARBOXYHEMOGLOBIN ARTERIAL: 0.3 % (ref 0–5)
CARBOXYHEMOGLOBIN ARTERIAL: 0.4 % (ref 0–5)
CARBOXYHEMOGLOBIN ARTERIAL: 0.9 % (ref 0–5)
CARBOXYHEMOGLOBIN ARTERIAL: 1.1 % (ref 0–5)
CARBOXYHEMOGLOBIN ARTERIAL: 1.6 % (ref 0–5)
CHLAMYDOPHILIA PNEUMONIAE BY PCR: NOT DETECTED
CHLORIDE BLD-SCNC: 100 MMOL/L (ref 98–111)
CHLORIDE BLD-SCNC: 102 MMOL/L (ref 98–111)
CHLORIDE BLD-SCNC: 99 MMOL/L (ref 98–111)
CHOLESTEROL, TOTAL: 214 MG/DL (ref 160–199)
CLARITY: ABNORMAL
CO2: 20 MMOL/L (ref 22–29)
CO2: 21 MMOL/L (ref 22–29)
CO2: 29 MMOL/L (ref 22–29)
COLOR: ABNORMAL
CORONAVIRUS 229E BY PCR: NOT DETECTED
CORONAVIRUS HKU1 BY PCR: NOT DETECTED
CORONAVIRUS NL63 BY PCR: NOT DETECTED
CORONAVIRUS OC43 BY PCR: NOT DETECTED
CREAT SERPL-MCNC: 0.9 MG/DL (ref 0.5–1.2)
CREAT SERPL-MCNC: 6.3 MG/DL (ref 0.5–1.2)
CREAT SERPL-MCNC: 7 MG/DL (ref 0.5–1.2)
CRYSTALS, UA: ABNORMAL /HPF
EOSINOPHILS ABSOLUTE: 0 K/UL (ref 0–0.6)
EOSINOPHILS ABSOLUTE: 0 K/UL (ref 0–0.6)
EOSINOPHILS ABSOLUTE: 0.08 K/UL (ref 0.04–0.54)
EOSINOPHILS ABSOLUTE: 0.1 K/UL (ref 0.04–0.54)
EOSINOPHILS ABSOLUTE: 0.1 K/UL (ref 0–0.6)
EOSINOPHILS RELATIVE PERCENT: 0 % (ref 0–5)
EOSINOPHILS RELATIVE PERCENT: 0 % (ref 0–5)
EOSINOPHILS RELATIVE PERCENT: 1.2 % (ref 0.7–7)
EOSINOPHILS RELATIVE PERCENT: 1.7 % (ref 0–5)
EOSINOPHILS RELATIVE PERCENT: 2.3 % (ref 0.7–7)
EPITHELIAL CELLS, UA: 4 /HPF (ref 0–5)
FIO2: 100 %
FIO2: 100 %
FIO2: 80 %
FIO2: 90 %
GFR AFRICAN AMERICAN: >59
GFR NON-AFRICAN AMERICAN: >60
GFR SERPL CREATININE-BSD FRML MDRD: 8 ML/MIN/{1.73_M2}
GFR SERPL CREATININE-BSD FRML MDRD: 9 ML/MIN/{1.73_M2}
GLUCOSE BLD-MCNC: 111 MG/DL (ref 74–109)
GLUCOSE BLD-MCNC: 142 MG/DL (ref 74–109)
GLUCOSE BLD-MCNC: 176 MG/DL (ref 74–109)
GLUCOSE URINE: NEGATIVE MG/DL
HCO3 ARTERIAL: 14.5 MMOL/L (ref 22–26)
HCO3 ARTERIAL: 18.6 MMOL/L (ref 22–26)
HCO3 ARTERIAL: 18.9 MMOL/L (ref 22–26)
HCO3 ARTERIAL: 20.8 MMOL/L (ref 22–26)
HCO3 ARTERIAL: 21.2 MMOL/L (ref 22–26)
HCT VFR BLD CALC: 29.8 % (ref 42–52)
HCT VFR BLD CALC: 33.9 % (ref 42–52)
HCT VFR BLD CALC: 44.3 % (ref 40.1–51)
HCT VFR BLD CALC: 48.2 % (ref 42–52)
HCT VFR BLD CALC: 49.5 % (ref 40.1–51)
HDLC SERPL-MCNC: 67 MG/DL (ref 55–121)
HEMOGLOBIN, ART, EXTENDED: 10.6 G/DL (ref 14–18)
HEMOGLOBIN, ART, EXTENDED: 10.7 G/DL (ref 14–18)
HEMOGLOBIN, ART, EXTENDED: 10.8 G/DL (ref 14–18)
HEMOGLOBIN, ART, EXTENDED: 11.1 G/DL (ref 14–18)
HEMOGLOBIN, ART, EXTENDED: 11.1 G/DL (ref 14–18)
HEMOGLOBIN: 10.2 G/DL (ref 14–18)
HEMOGLOBIN: 11.5 G/DL (ref 14–18)
HEMOGLOBIN: 14.2 G/DL (ref 13.7–17.5)
HEMOGLOBIN: 16 G/DL (ref 14–18)
HEMOGLOBIN: 16.3 G/DL (ref 13.7–17.5)
HUMAN METAPNEUMOVIRUS BY PCR: NOT DETECTED
HUMAN RHINOVIRUS/ENTEROVIRUS BY PCR: NOT DETECTED
HYALINE CASTS: 7 /HPF (ref 0–8)
IMMATURE GRANULOCYTES #: 0 K/UL
IMMATURE GRANULOCYTES #: 0 K/UL
IMMATURE GRANULOCYTES #: 0.1 K/UL
INFLUENZA A BY PCR: NOT DETECTED
INFLUENZA B BY PCR: NOT DETECTED
KETONES, URINE: ABNORMAL MG/DL
LACTIC ACID, SEPSIS: 3.7 MG/DL (ref 0.5–1.9)
LACTIC ACID, SEPSIS: 5.7 MG/DL (ref 0.5–1.9)
LACTIC ACID: 1.4 MMOL/L (ref 0.5–1.9)
LDL CHOLESTEROL CALCULATED: 116 MG/DL
LEUKOCYTE ESTERASE, URINE: NEGATIVE
LIPASE: 13 U/L (ref 13–60)
LYMPHOCYTES ABSOLUTE: 0.4 K/UL (ref 1.1–4.5)
LYMPHOCYTES ABSOLUTE: 0.4 K/UL (ref 1.1–4.5)
LYMPHOCYTES ABSOLUTE: 0.83 K/UL (ref 1.18–3.74)
LYMPHOCYTES ABSOLUTE: 2.19 K/UL (ref 1.18–3.74)
LYMPHOCYTES ABSOLUTE: 2.3 K/UL (ref 1.1–4.5)
LYMPHOCYTES RELATIVE PERCENT: 19.1 % (ref 19.3–53.1)
LYMPHOCYTES RELATIVE PERCENT: 33.3 % (ref 19.3–53.1)
LYMPHOCYTES RELATIVE PERCENT: 38 % (ref 20–40)
LYMPHOCYTES RELATIVE PERCENT: 5.4 % (ref 20–40)
LYMPHOCYTES RELATIVE PERCENT: 8.4 % (ref 20–40)
MAGNESIUM: 1.5 MG/DL (ref 1.6–2.4)
MCH RBC QN AUTO: 32.7 PG (ref 25.7–32.2)
MCH RBC QN AUTO: 33.3 PG (ref 27–31)
MCH RBC QN AUTO: 34.4 PG (ref 25.7–32.2)
MCH RBC QN AUTO: 34.6 PG (ref 27–31)
MCH RBC QN AUTO: 34.7 PG (ref 27–31)
MCHC RBC AUTO-ENTMCNC: 32.1 G/DL (ref 32.3–36.5)
MCHC RBC AUTO-ENTMCNC: 32.9 G/DL (ref 32.3–36.5)
MCHC RBC AUTO-ENTMCNC: 33.2 G/DL (ref 33–37)
MCHC RBC AUTO-ENTMCNC: 33.9 G/DL (ref 33–37)
MCHC RBC AUTO-ENTMCNC: 34.2 G/DL (ref 33–37)
MCV RBC AUTO: 100.2 FL (ref 80–94)
MCV RBC AUTO: 101.4 FL (ref 80–94)
MCV RBC AUTO: 102.1 FL (ref 80–94)
MCV RBC AUTO: 107.3 FL (ref 79–92.2)
MCV RBC AUTO: 99.4 FL (ref 79–92.2)
MECHANICAL RATE IN BPM: 14
MECHANICAL RATE IN BPM: 22
MECHANICAL RATE IN BPM: 26
METHEMOGLOBIN ARTERIAL: 0.2 %
METHEMOGLOBIN ARTERIAL: 0.3 %
METHEMOGLOBIN ARTERIAL: 0.6 %
MODE: ABNORMAL
MONOCYTES ABSOLUTE: 0.39 K/UL (ref 0.24–0.82)
MONOCYTES ABSOLUTE: 0.45 K/UL (ref 0.24–0.82)
MONOCYTES ABSOLUTE: 0.5 K/UL (ref 0–0.9)
MONOCYTES ABSOLUTE: 0.6 K/UL (ref 0–0.9)
MONOCYTES ABSOLUTE: 0.7 K/UL (ref 0–0.9)
MONOCYTES RELATIVE PERCENT: 10.3 % (ref 4.7–12.5)
MONOCYTES RELATIVE PERCENT: 15.4 % (ref 0–10)
MONOCYTES RELATIVE PERCENT: 5.9 % (ref 4.7–12.5)
MONOCYTES RELATIVE PERCENT: 8 % (ref 0–10)
MONOCYTES RELATIVE PERCENT: 9 % (ref 0–10)
MYCOPLASMA PNEUMONIAE BY PCR: NOT DETECTED
NEUTROPHILS ABSOLUTE: 2.91 K/UL (ref 1.56–6.13)
NEUTROPHILS ABSOLUTE: 3.1 K/UL (ref 1.5–7.5)
NEUTROPHILS ABSOLUTE: 3.4 K/UL (ref 1.5–7.5)
NEUTROPHILS ABSOLUTE: 3.85 K/UL (ref 1.56–6.13)
NEUTROPHILS ABSOLUTE: 5.7 K/UL (ref 1.5–7.5)
NEUTROPHILS RELATIVE PERCENT: 51.3 % (ref 50–65)
NEUTROPHILS RELATIVE PERCENT: 58.5 % (ref 34–71.1)
NEUTROPHILS RELATIVE PERCENT: 66.9 % (ref 34–71.1)
NEUTROPHILS RELATIVE PERCENT: 74.9 % (ref 50–65)
NEUTROPHILS RELATIVE PERCENT: 85.2 % (ref 50–65)
NITRITE, URINE: NEGATIVE
O2 CONTENT ARTERIAL: 10.3 ML/DL
O2 CONTENT ARTERIAL: 13.5 ML/DL
O2 CONTENT ARTERIAL: 13.6 ML/DL
O2 CONTENT ARTERIAL: 14.5 ML/DL
O2 CONTENT ARTERIAL: 14.6 ML/DL
O2 DELIVERY DEVICE: ABNORMAL
O2 DELIVERY DEVICE: ABNORMAL
O2 SAT, ARTERIAL: 66.2 %
O2 SAT, ARTERIAL: 86.5 %
O2 SAT, ARTERIAL: 90.4 %
O2 SAT, ARTERIAL: 95.3 %
O2 SAT, ARTERIAL: 96.5 %
O2 THERAPY: ABNORMAL
OXYGEN FLOW: 4
PARAINFLUENZA VIRUS 1 BY PCR: NOT DETECTED
PARAINFLUENZA VIRUS 2 BY PCR: NOT DETECTED
PARAINFLUENZA VIRUS 3 BY PCR: NOT DETECTED
PARAINFLUENZA VIRUS 4 BY PCR: NOT DETECTED
PCO2 ARTERIAL: 32 MMHG (ref 35–45)
PCO2 ARTERIAL: 36 MMHG (ref 35–45)
PCO2 ARTERIAL: 37 MMHG (ref 35–45)
PCO2 ARTERIAL: 37 MMHG (ref 35–45)
PCO2 ARTERIAL: 43 MMHG (ref 35–45)
PDW BLD-RTO: 12.6 % (ref 11.6–14.4)
PDW BLD-RTO: 13.3 % (ref 11.5–14.5)
PDW BLD-RTO: 13.8 % (ref 11.5–14.5)
PDW BLD-RTO: 14 % (ref 11.5–14.5)
PDW BLD-RTO: 14.2 % (ref 11.6–14.4)
PH ARTERIAL: 7.2 (ref 7.35–7.45)
PH ARTERIAL: 7.25 (ref 7.35–7.45)
PH ARTERIAL: 7.31 (ref 7.35–7.45)
PH ARTERIAL: 7.37 (ref 7.35–7.45)
PH ARTERIAL: 7.43 (ref 7.35–7.45)
PH UA: 5 (ref 5–8)
PHOSPHORUS: 3.2 MG/DL (ref 2.5–4.5)
PLATELET # BLD: 183 K/UL (ref 163–337)
PLATELET # BLD: 218 K/UL (ref 163–337)
PLATELET # BLD: 232 K/UL (ref 130–400)
PLATELET # BLD: 244 K/UL (ref 130–400)
PLATELET # BLD: 269 K/UL (ref 130–400)
PMV BLD AUTO: 10.1 FL (ref 9.4–12.4)
PMV BLD AUTO: 10.2 FL (ref 9.4–12.4)
PMV BLD AUTO: 9.5 FL (ref 7.4–10.4)
PMV BLD AUTO: 9.6 FL (ref 9.4–12.4)
PMV BLD AUTO: 9.9 FL (ref 7.4–10.4)
PO2 ARTERIAL: 35 MMHG (ref 80–100)
PO2 ARTERIAL: 51 MMHG (ref 80–100)
PO2 ARTERIAL: 62 MMHG (ref 80–100)
PO2 ARTERIAL: 84 MMHG (ref 80–100)
PO2 ARTERIAL: 96 MMHG (ref 80–100)
POSITIVE END EXP PRESS: 5
POSITIVE END EXP PRESS: 6
POSITIVE END EXP PRESS: 7
POTASSIUM SERPL-SCNC: 3.5 MMOL/L (ref 3.5–5)
POTASSIUM SERPL-SCNC: 4 MMOL/L (ref 3.5–5)
POTASSIUM SERPL-SCNC: 4.4 MMOL/L (ref 3.5–5)
POTASSIUM, WHOLE BLOOD: 3.5
POTASSIUM, WHOLE BLOOD: 3.9
POTASSIUM, WHOLE BLOOD: 4.1
POTASSIUM, WHOLE BLOOD: 4.3
POTASSIUM, WHOLE BLOOD: 4.4
PROSTATE SPECIFIC ANTIGEN: 0.29 NG/ML (ref 0–4)
PROSTATE SPECIFIC ANTIGEN: 52.3 NG/ML (ref 0–4)
PROTEIN UA: 30 MG/DL
RBC # BLD: 2.94 M/UL (ref 4.7–6.1)
RBC # BLD: 3.32 M/UL (ref 4.7–6.1)
RBC # BLD: 4.13 M/UL (ref 4.63–6.08)
RBC # BLD: 4.81 M/UL (ref 4.7–6.1)
RBC # BLD: 4.98 M/UL (ref 4.63–6.08)
RBC UA: 1 /HPF (ref 0–4)
RESPIRATORY SYNCYTIAL VIRUS BY PCR: NOT DETECTED
SAMPLE SOURCE: ABNORMAL
SARS-COV-2, NAAT: NOT DETECTED
SARS-COV-2, PCR: NOT DETECTED
SODIUM BLD-SCNC: 136 MMOL/L (ref 136–145)
SODIUM BLD-SCNC: 137 MMOL/L (ref 136–145)
SODIUM BLD-SCNC: 143 MMOL/L (ref 136–145)
SPECIFIC GRAVITY UA: 1.01 (ref 1–1.03)
TOTAL PROTEIN: 5.7 G/DL (ref 6.6–8.7)
TOTAL PROTEIN: 7 G/DL (ref 6.6–8.7)
TOTAL PROTEIN: 7.1 G/DL (ref 6.6–8.7)
TRIGL SERPL-MCNC: 156 MG/DL (ref 0–149)
TROPONIN: 0.02 NG/ML (ref 0–0.03)
UROBILINOGEN, URINE: 0.2 E.U./DL
VT MECHANICAL: 450 %
VT MECHANICAL: 500 %
WBC # BLD: 4.35 K/UL (ref 4.23–9.07)
WBC # BLD: 4.6 K/UL (ref 4.8–10.8)
WBC # BLD: 6 K/UL (ref 4.8–10.8)
WBC # BLD: 6.58 K/UL (ref 4.23–9.07)
WBC # BLD: 6.7 K/UL (ref 4.8–10.8)
WBC UA: 6 /HPF (ref 0–5)

## 2022-01-01 PROCEDURE — 6360000002 HC RX W HCPCS: Performed by: HOSPITALIST

## 2022-01-01 PROCEDURE — 83605 ASSAY OF LACTIC ACID: CPT

## 2022-01-01 PROCEDURE — 1123F ACP DISCUSS/DSCN MKR DOCD: CPT | Performed by: NURSE PRACTITIONER

## 2022-01-01 PROCEDURE — 3017F COLORECTAL CA SCREEN DOC REV: CPT | Performed by: NURSE PRACTITIONER

## 2022-01-01 PROCEDURE — 3430000000 HC RX DIAGNOSTIC RADIOPHARMACEUTICAL: Performed by: PEDIATRICS

## 2022-01-01 PROCEDURE — 71045 X-RAY EXAM CHEST 1 VIEW: CPT | Performed by: RADIOLOGY

## 2022-01-01 PROCEDURE — 6360000002 HC RX W HCPCS: Performed by: INTERNAL MEDICINE

## 2022-01-01 PROCEDURE — G8427 DOCREV CUR MEDS BY ELIG CLIN: HCPCS | Performed by: INTERNAL MEDICINE

## 2022-01-01 PROCEDURE — 36600 WITHDRAWAL OF ARTERIAL BLOOD: CPT

## 2022-01-01 PROCEDURE — 6360000002 HC RX W HCPCS: Performed by: PEDIATRICS

## 2022-01-01 PROCEDURE — 99214 OFFICE O/P EST MOD 30 MIN: CPT | Performed by: NURSE PRACTITIONER

## 2022-01-01 PROCEDURE — 2000000000 HC ICU R&B

## 2022-01-01 PROCEDURE — 1036F TOBACCO NON-USER: CPT | Performed by: INTERNAL MEDICINE

## 2022-01-01 PROCEDURE — 94002 VENT MGMT INPAT INIT DAY: CPT

## 2022-01-01 PROCEDURE — 78580 LUNG PERFUSION IMAGING: CPT

## 2022-01-01 PROCEDURE — 2500000003 HC RX 250 WO HCPCS: Performed by: PEDIATRICS

## 2022-01-01 PROCEDURE — 3017F COLORECTAL CA SCREEN DOC REV: CPT | Performed by: INTERNAL MEDICINE

## 2022-01-01 PROCEDURE — 1123F ACP DISCUSS/DSCN MKR DOCD: CPT | Performed by: INTERNAL MEDICINE

## 2022-01-01 PROCEDURE — G8427 DOCREV CUR MEDS BY ELIG CLIN: HCPCS | Performed by: NURSE PRACTITIONER

## 2022-01-01 PROCEDURE — 99291 CRITICAL CARE FIRST HOUR: CPT | Performed by: INTERNAL MEDICINE

## 2022-01-01 PROCEDURE — 74176 CT ABD & PELVIS W/O CONTRAST: CPT | Performed by: RADIOLOGY

## 2022-01-01 PROCEDURE — 4040F PNEUMOC VAC/ADMIN/RCVD: CPT | Performed by: NURSE PRACTITIONER

## 2022-01-01 PROCEDURE — 2500000003 HC RX 250 WO HCPCS

## 2022-01-01 PROCEDURE — 85025 COMPLETE CBC W/AUTO DIFF WBC: CPT

## 2022-01-01 PROCEDURE — 74176 CT ABD & PELVIS W/O CONTRAST: CPT

## 2022-01-01 PROCEDURE — 71250 CT THORAX DX C-: CPT

## 2022-01-01 PROCEDURE — 0202U NFCT DS 22 TRGT SARS-COV-2: CPT

## 2022-01-01 PROCEDURE — G8484 FLU IMMUNIZE NO ADMIN: HCPCS | Performed by: INTERNAL MEDICINE

## 2022-01-01 PROCEDURE — 36415 COLL VENOUS BLD VENIPUNCTURE: CPT

## 2022-01-01 PROCEDURE — 99204 OFFICE O/P NEW MOD 45 MIN: CPT | Performed by: INTERNAL MEDICINE

## 2022-01-01 PROCEDURE — 99213 OFFICE O/P EST LOW 20 MIN: CPT | Performed by: NURSE PRACTITIONER

## 2022-01-01 PROCEDURE — 83690 ASSAY OF LIPASE: CPT

## 2022-01-01 PROCEDURE — G8417 CALC BMI ABV UP PARAM F/U: HCPCS | Performed by: NURSE PRACTITIONER

## 2022-01-01 PROCEDURE — 36556 INSERT NON-TUNNEL CV CATH: CPT

## 2022-01-01 PROCEDURE — G8484 FLU IMMUNIZE NO ADMIN: HCPCS | Performed by: NURSE PRACTITIONER

## 2022-01-01 PROCEDURE — 2580000003 HC RX 258: Performed by: INTERNAL MEDICINE

## 2022-01-01 PROCEDURE — 71045 X-RAY EXAM CHEST 1 VIEW: CPT

## 2022-01-01 PROCEDURE — 84484 ASSAY OF TROPONIN QUANT: CPT

## 2022-01-01 PROCEDURE — 82803 BLOOD GASES ANY COMBINATION: CPT

## 2022-01-01 PROCEDURE — 80053 COMPREHEN METABOLIC PANEL: CPT

## 2022-01-01 PROCEDURE — 1036F TOBACCO NON-USER: CPT | Performed by: NURSE PRACTITIONER

## 2022-01-01 PROCEDURE — G8417 CALC BMI ABV UP PARAM F/U: HCPCS | Performed by: INTERNAL MEDICINE

## 2022-01-01 PROCEDURE — 2500000003 HC RX 250 WO HCPCS: Performed by: INTERNAL MEDICINE

## 2022-01-01 PROCEDURE — 87040 BLOOD CULTURE FOR BACTERIA: CPT

## 2022-01-01 PROCEDURE — 99222 1ST HOSP IP/OBS MODERATE 55: CPT | Performed by: SURGERY

## 2022-01-01 PROCEDURE — G0439 PPPS, SUBSEQ VISIT: HCPCS | Performed by: NURSE PRACTITIONER

## 2022-01-01 PROCEDURE — 78580 LUNG PERFUSION IMAGING: CPT | Performed by: RADIOLOGY

## 2022-01-01 PROCEDURE — 99214 OFFICE O/P EST MOD 30 MIN: CPT | Performed by: INTERNAL MEDICINE

## 2022-01-01 PROCEDURE — 0BH17EZ INSERTION OF ENDOTRACHEAL AIRWAY INTO TRACHEA, VIA NATURAL OR ARTIFICIAL OPENING: ICD-10-PCS | Performed by: PEDIATRICS

## 2022-01-01 PROCEDURE — 99212 OFFICE O/P EST SF 10 MIN: CPT

## 2022-01-01 PROCEDURE — 99211 OFF/OP EST MAY X REQ PHY/QHP: CPT

## 2022-01-01 PROCEDURE — 87635 SARS-COV-2 COVID-19 AMP PRB: CPT

## 2022-01-01 PROCEDURE — 83735 ASSAY OF MAGNESIUM: CPT

## 2022-01-01 PROCEDURE — 2700000000 HC OXYGEN THERAPY PER DAY

## 2022-01-01 PROCEDURE — 84100 ASSAY OF PHOSPHORUS: CPT

## 2022-01-01 PROCEDURE — 99292 CRITICAL CARE ADDL 30 MIN: CPT | Performed by: INTERNAL MEDICINE

## 2022-01-01 PROCEDURE — 31500 INSERT EMERGENCY AIRWAY: CPT

## 2022-01-01 PROCEDURE — A9540 TC99M MAA: HCPCS | Performed by: PEDIATRICS

## 2022-01-01 PROCEDURE — 99285 EMERGENCY DEPT VISIT HI MDM: CPT

## 2022-01-01 PROCEDURE — 81001 URINALYSIS AUTO W/SCOPE: CPT

## 2022-01-01 PROCEDURE — 2580000003 HC RX 258: Performed by: PEDIATRICS

## 2022-01-01 PROCEDURE — 5A1935Z RESPIRATORY VENTILATION, LESS THAN 24 CONSECUTIVE HOURS: ICD-10-PCS | Performed by: PEDIATRICS

## 2022-01-01 PROCEDURE — 06HY33Z INSERTION OF INFUSION DEVICE INTO LOWER VEIN, PERCUTANEOUS APPROACH: ICD-10-PCS | Performed by: PEDIATRICS

## 2022-01-01 RX ORDER — ENOXAPARIN SODIUM 100 MG/ML
30 INJECTION SUBCUTANEOUS DAILY
Status: DISCONTINUED | OUTPATIENT
Start: 2022-01-01 | End: 2022-01-01 | Stop reason: HOSPADM

## 2022-01-01 RX ORDER — SIMVASTATIN 20 MG
20 TABLET ORAL NIGHTLY
Qty: 90 TABLET | Refills: 3 | OUTPATIENT
Start: 2022-01-01

## 2022-01-01 RX ORDER — MEROPENEM AND SODIUM CHLORIDE 1 G/50ML
1000 INJECTION, SOLUTION INTRAVENOUS ONCE
Status: COMPLETED | OUTPATIENT
Start: 2022-01-01 | End: 2022-01-01

## 2022-01-01 RX ORDER — POTASSIUM CHLORIDE 20 MEQ/1
20 TABLET, EXTENDED RELEASE ORAL EVERY OTHER DAY
Qty: 60 TABLET | Refills: 1 | Status: SHIPPED | OUTPATIENT
Start: 2022-01-01

## 2022-01-01 RX ORDER — SODIUM CHLORIDE 9 MG/ML
INJECTION, SOLUTION INTRAVENOUS CONTINUOUS
Status: DISCONTINUED | OUTPATIENT
Start: 2022-01-01 | End: 2022-01-01

## 2022-01-01 RX ORDER — ACETAMINOPHEN AND CODEINE PHOSPHATE 60; 300 MG/1; MG/1
1 TABLET ORAL 4 TIMES DAILY PRN
Qty: 120 TABLET | Refills: 0 | Status: SHIPPED | OUTPATIENT
Start: 2022-01-01 | End: 2022-01-01 | Stop reason: SDUPTHER

## 2022-01-01 RX ORDER — SODIUM CHLORIDE 0.9 % (FLUSH) 0.9 %
5-40 SYRINGE (ML) INJECTION PRN
OUTPATIENT
Start: 2022-01-01

## 2022-01-01 RX ORDER — ACETAMINOPHEN 650 MG/1
650 SUPPOSITORY RECTAL EVERY 6 HOURS PRN
Status: DISCONTINUED | OUTPATIENT
Start: 2022-01-01 | End: 2022-01-01 | Stop reason: HOSPADM

## 2022-01-01 RX ORDER — METRONIDAZOLE 500 MG/100ML
500 INJECTION, SOLUTION INTRAVENOUS EVERY 8 HOURS
OUTPATIENT
Start: 2022-01-01

## 2022-01-01 RX ORDER — SODIUM CHLORIDE, SODIUM LACTATE, POTASSIUM CHLORIDE, AND CALCIUM CHLORIDE .6; .31; .03; .02 G/100ML; G/100ML; G/100ML; G/100ML
30 INJECTION, SOLUTION INTRAVENOUS ONCE
Status: COMPLETED | OUTPATIENT
Start: 2022-01-01 | End: 2022-01-01

## 2022-01-01 RX ORDER — METRONIDAZOLE 500 MG/100ML
500 INJECTION, SOLUTION INTRAVENOUS EVERY 8 HOURS
Status: DISCONTINUED | OUTPATIENT
Start: 2022-01-01 | End: 2022-01-01 | Stop reason: HOSPADM

## 2022-01-01 RX ORDER — PROPOFOL 10 MG/ML
INJECTION, EMULSION INTRAVENOUS
Status: DISPENSED
Start: 2022-01-01 | End: 2022-01-01

## 2022-01-01 RX ORDER — SIMVASTATIN 40 MG
20 TABLET ORAL NIGHTLY
Qty: 90 TABLET | Refills: 1 | Status: SHIPPED | OUTPATIENT
Start: 2022-01-01 | End: 2022-01-01 | Stop reason: SDUPTHER

## 2022-01-01 RX ORDER — 0.9 % SODIUM CHLORIDE 0.9 %
1000 INTRAVENOUS SOLUTION INTRAVENOUS ONCE
Status: COMPLETED | OUTPATIENT
Start: 2022-01-01 | End: 2022-01-01

## 2022-01-01 RX ORDER — AMLODIPINE BESYLATE 10 MG/1
10 TABLET ORAL DAILY
Qty: 90 TABLET | Refills: 1 | Status: SHIPPED | OUTPATIENT
Start: 2022-01-01 | End: 2022-01-01

## 2022-01-01 RX ORDER — AMLODIPINE BESYLATE 10 MG/1
10 TABLET ORAL DAILY
Qty: 90 TABLET | Refills: 1 | Status: SHIPPED | OUTPATIENT
Start: 2022-01-01 | End: 2022-01-01 | Stop reason: SDUPTHER

## 2022-01-01 RX ORDER — MECLIZINE HYDROCHLORIDE 25 MG/1
25 TABLET ORAL 3 TIMES DAILY PRN
Qty: 270 TABLET | Refills: 1 | Status: SHIPPED | OUTPATIENT
Start: 2022-01-01

## 2022-01-01 RX ORDER — DEXMEDETOMIDINE HYDROCHLORIDE 4 UG/ML
.1-1.5 INJECTION, SOLUTION INTRAVENOUS CONTINUOUS
Status: DISCONTINUED | OUTPATIENT
Start: 2022-01-01 | End: 2022-01-01

## 2022-01-01 RX ORDER — LORAZEPAM 1 MG/1
1 TABLET ORAL 2 TIMES DAILY
Qty: 180 TABLET | Refills: 1 | Status: SHIPPED | OUTPATIENT
Start: 2022-01-01 | End: 2022-01-01 | Stop reason: SDUPTHER

## 2022-01-01 RX ORDER — ACETAMINOPHEN 650 MG/1
650 SUPPOSITORY RECTAL EVERY 6 HOURS PRN
OUTPATIENT
Start: 2022-01-01

## 2022-01-01 RX ORDER — HYDROCODONE BITARTRATE AND ACETAMINOPHEN 5; 325 MG/1; MG/1
1 TABLET ORAL EVERY 8 HOURS PRN
COMMUNITY
Start: 2022-01-01 | End: 2022-01-01 | Stop reason: SDUPTHER

## 2022-01-01 RX ORDER — ACETAMINOPHEN AND CODEINE PHOSPHATE 60; 300 MG/1; MG/1
1 TABLET ORAL 4 TIMES DAILY PRN
Qty: 30 TABLET | Refills: 0 | Status: SHIPPED | OUTPATIENT
Start: 2022-01-01 | End: 2022-01-01 | Stop reason: SDUPTHER

## 2022-01-01 RX ORDER — PROPOFOL 10 MG/ML
10 INJECTION, EMULSION INTRAVENOUS
Status: DISCONTINUED | OUTPATIENT
Start: 2022-01-01 | End: 2022-01-01

## 2022-01-01 RX ORDER — HYDROCODONE BITARTRATE AND ACETAMINOPHEN 5; 325 MG/1; MG/1
1 TABLET ORAL EVERY 8 HOURS PRN
Qty: 90 TABLET | Refills: 0 | Status: SHIPPED | OUTPATIENT
Start: 2022-01-01 | End: 2022-01-01 | Stop reason: SDUPTHER

## 2022-01-01 RX ORDER — ACETAMINOPHEN AND CODEINE PHOSPHATE 60; 300 MG/1; MG/1
1 TABLET ORAL 4 TIMES DAILY PRN
Qty: 120 TABLET | Refills: 0 | Status: SHIPPED | OUTPATIENT
Start: 2022-01-01 | End: 2022-01-01

## 2022-01-01 RX ORDER — SIMVASTATIN 20 MG
20 TABLET ORAL NIGHTLY
Qty: 90 TABLET | Refills: 1 | Status: SHIPPED | OUTPATIENT
Start: 2022-01-01 | End: 2022-01-01

## 2022-01-01 RX ORDER — FUROSEMIDE 20 MG/1
20 TABLET ORAL DAILY
Qty: 90 TABLET | Refills: 1 | Status: SHIPPED | OUTPATIENT
Start: 2022-01-01

## 2022-01-01 RX ORDER — SIMVASTATIN 40 MG
20 TABLET ORAL NIGHTLY
Qty: 90 TABLET | Refills: 1 | Status: SHIPPED | OUTPATIENT
Start: 2022-01-01

## 2022-01-01 RX ORDER — NOREPINEPHRINE BIT/0.9 % NACL 16MG/250ML
1-100 INFUSION BOTTLE (ML) INTRAVENOUS CONTINUOUS
OUTPATIENT
Start: 2022-01-01

## 2022-01-01 RX ORDER — 0.9 % SODIUM CHLORIDE 0.9 %
30 INTRAVENOUS SOLUTION INTRAVENOUS ONCE
Status: COMPLETED | OUTPATIENT
Start: 2022-01-01 | End: 2022-01-01

## 2022-01-01 RX ORDER — OXYCODONE HYDROCHLORIDE AND ACETAMINOPHEN 5; 325 MG/1; MG/1
1 TABLET ORAL EVERY 4 HOURS PRN
Qty: 18 TABLET | Refills: 0 | OUTPATIENT
Start: 2022-01-01 | End: 2022-01-01

## 2022-01-01 RX ORDER — VECURONIUM BROMIDE 1 MG/ML
10 INJECTION, POWDER, LYOPHILIZED, FOR SOLUTION INTRAVENOUS ONCE
Status: DISCONTINUED | OUTPATIENT
Start: 2022-01-01 | End: 2022-01-01 | Stop reason: HOSPADM

## 2022-01-01 RX ORDER — ETOMIDATE 2 MG/ML
20 INJECTION INTRAVENOUS ONCE
Status: COMPLETED | OUTPATIENT
Start: 2022-01-01 | End: 2022-01-01

## 2022-01-01 RX ORDER — PROPOFOL 10 MG/ML
5-50 INJECTION, EMULSION INTRAVENOUS CONTINUOUS
OUTPATIENT
Start: 2022-01-01

## 2022-01-01 RX ORDER — IRBESARTAN 300 MG/1
300 TABLET ORAL NIGHTLY
Qty: 90 TABLET | Refills: 1 | Status: SHIPPED | OUTPATIENT
Start: 2022-01-01 | End: 2022-01-01 | Stop reason: SDUPTHER

## 2022-01-01 RX ORDER — VECURONIUM BROMIDE 1 MG/ML
10 INJECTION, POWDER, LYOPHILIZED, FOR SOLUTION INTRAVENOUS ONCE
Status: COMPLETED | OUTPATIENT
Start: 2022-01-01 | End: 2022-01-01

## 2022-01-01 RX ORDER — MEROPENEM AND SODIUM CHLORIDE 500 MG/50ML
500 INJECTION, SOLUTION INTRAVENOUS EVERY 12 HOURS
Status: DISCONTINUED | OUTPATIENT
Start: 2022-01-01 | End: 2022-01-01 | Stop reason: HOSPADM

## 2022-01-01 RX ORDER — SODIUM CHLORIDE, SODIUM LACTATE, POTASSIUM CHLORIDE, CALCIUM CHLORIDE 600; 310; 30; 20 MG/100ML; MG/100ML; MG/100ML; MG/100ML
INJECTION, SOLUTION INTRAVENOUS CONTINUOUS
Status: DISCONTINUED | OUTPATIENT
Start: 2022-01-01 | End: 2022-01-01 | Stop reason: HOSPADM

## 2022-01-01 RX ORDER — MIDAZOLAM HYDROCHLORIDE 1 MG/ML
1-10 INJECTION, SOLUTION INTRAVENOUS CONTINUOUS
OUTPATIENT
Start: 2022-01-01

## 2022-01-01 RX ORDER — SODIUM CHLORIDE 0.9 % (FLUSH) 0.9 %
5-40 SYRINGE (ML) INJECTION EVERY 12 HOURS SCHEDULED
Status: DISCONTINUED | OUTPATIENT
Start: 2022-01-01 | End: 2022-01-01 | Stop reason: HOSPADM

## 2022-01-01 RX ORDER — SODIUM CHLORIDE 0.9 % (FLUSH) 0.9 %
5-40 SYRINGE (ML) INJECTION PRN
Status: DISCONTINUED | OUTPATIENT
Start: 2022-01-01 | End: 2022-01-01 | Stop reason: HOSPADM

## 2022-01-01 RX ORDER — SODIUM CHLORIDE 0.9 % (FLUSH) 0.9 %
5-40 SYRINGE (ML) INJECTION EVERY 12 HOURS SCHEDULED
OUTPATIENT
Start: 2022-01-01

## 2022-01-01 RX ORDER — SODIUM CHLORIDE, SODIUM LACTATE, POTASSIUM CHLORIDE, CALCIUM CHLORIDE 600; 310; 30; 20 MG/100ML; MG/100ML; MG/100ML; MG/100ML
INJECTION, SOLUTION INTRAVENOUS CONTINUOUS
OUTPATIENT
Start: 2022-01-01

## 2022-01-01 RX ORDER — HYDROCODONE BITARTRATE AND ACETAMINOPHEN 5; 325 MG/1; MG/1
1 TABLET ORAL EVERY 8 HOURS PRN
Qty: 90 TABLET | Refills: 0 | Status: SHIPPED | OUTPATIENT
Start: 2022-01-01 | End: 2022-01-01

## 2022-01-01 RX ORDER — SUCCINYLCHOLINE CHLORIDE 20 MG/ML
100 INJECTION INTRAMUSCULAR; INTRAVENOUS ONCE
Status: COMPLETED | OUTPATIENT
Start: 2022-01-01 | End: 2022-01-01

## 2022-01-01 RX ORDER — FENTANYL CITRATE-0.9 % NACL/PF 10 MCG/ML
25-200 PLASTIC BAG, INJECTION (ML) INTRAVENOUS CONTINUOUS
Status: DISCONTINUED | OUTPATIENT
Start: 2022-01-01 | End: 2022-01-01 | Stop reason: HOSPADM

## 2022-01-01 RX ORDER — NOREPINEPHRINE BIT/0.9 % NACL 16MG/250ML
1-100 INFUSION BOTTLE (ML) INTRAVENOUS CONTINUOUS
Status: DISCONTINUED | OUTPATIENT
Start: 2022-01-01 | End: 2022-01-01 | Stop reason: HOSPADM

## 2022-01-01 RX ORDER — LORAZEPAM 1 MG/1
1 TABLET ORAL 2 TIMES DAILY
Qty: 180 TABLET | Refills: 0 | Status: SHIPPED | OUTPATIENT
Start: 2022-01-01 | End: 2022-11-08

## 2022-01-01 RX ORDER — ACETAMINOPHEN 325 MG/1
650 TABLET ORAL EVERY 6 HOURS PRN
Status: DISCONTINUED | OUTPATIENT
Start: 2022-01-01 | End: 2022-01-01 | Stop reason: HOSPADM

## 2022-01-01 RX ORDER — PROPOFOL 10 MG/ML
5-50 INJECTION, EMULSION INTRAVENOUS CONTINUOUS
Status: DISCONTINUED | OUTPATIENT
Start: 2022-01-01 | End: 2022-01-01 | Stop reason: HOSPADM

## 2022-01-01 RX ORDER — DEXMEDETOMIDINE HYDROCHLORIDE 4 UG/ML
INJECTION, SOLUTION INTRAVENOUS
Status: COMPLETED
Start: 2022-01-01 | End: 2022-01-01

## 2022-01-01 RX ORDER — GABAPENTIN 300 MG/1
300 CAPSULE ORAL 3 TIMES DAILY
Qty: 270 CAPSULE | Refills: 1 | OUTPATIENT
Start: 2022-01-01 | End: 2022-01-01

## 2022-01-01 RX ORDER — GABAPENTIN 300 MG/1
300 CAPSULE ORAL 3 TIMES DAILY
Qty: 270 CAPSULE | Refills: 1 | Status: SHIPPED | OUTPATIENT
Start: 2022-01-01 | End: 2022-01-01

## 2022-01-01 RX ORDER — AMLODIPINE BESYLATE 10 MG/1
10 TABLET ORAL DAILY
Qty: 90 TABLET | Refills: 1 | OUTPATIENT
Start: 2022-01-01 | End: 2022-01-01

## 2022-01-01 RX ORDER — LORAZEPAM 1 MG/1
1 TABLET ORAL 2 TIMES DAILY
Qty: 60 TABLET | Refills: 2 | OUTPATIENT
Start: 2022-01-01 | End: 2022-01-01

## 2022-01-01 RX ORDER — FENTANYL CITRATE-0.9 % NACL/PF 10 MCG/ML
25-200 PLASTIC BAG, INJECTION (ML) INTRAVENOUS CONTINUOUS
OUTPATIENT
Start: 2022-01-01

## 2022-01-01 RX ORDER — LORAZEPAM 1 MG/1
1 TABLET ORAL 2 TIMES DAILY
Qty: 180 TABLET | Refills: 1 | OUTPATIENT
Start: 2022-01-01 | End: 2022-01-01

## 2022-01-01 RX ORDER — MIDAZOLAM HYDROCHLORIDE 1 MG/ML
1-10 INJECTION, SOLUTION INTRAVENOUS CONTINUOUS
Status: DISCONTINUED | OUTPATIENT
Start: 2022-01-01 | End: 2022-01-01 | Stop reason: HOSPADM

## 2022-01-01 RX ORDER — ACETAMINOPHEN 325 MG/1
650 TABLET ORAL EVERY 6 HOURS PRN
OUTPATIENT
Start: 2022-01-01

## 2022-01-01 RX ORDER — LORAZEPAM 2 MG/ML
2 INJECTION INTRAMUSCULAR ONCE
Status: COMPLETED | OUTPATIENT
Start: 2022-01-01 | End: 2022-01-01

## 2022-01-01 RX ORDER — IRBESARTAN 300 MG/1
300 TABLET ORAL NIGHTLY
Qty: 90 TABLET | Refills: 1 | Status: SHIPPED | OUTPATIENT
Start: 2022-01-01

## 2022-01-01 RX ORDER — SODIUM CHLORIDE 9 MG/ML
INJECTION, SOLUTION INTRAVENOUS PRN
Status: DISCONTINUED | OUTPATIENT
Start: 2022-01-01 | End: 2022-01-01 | Stop reason: HOSPADM

## 2022-01-01 RX ORDER — SODIUM CHLORIDE 9 MG/ML
INJECTION, SOLUTION INTRAVENOUS PRN
OUTPATIENT
Start: 2022-01-01

## 2022-01-01 RX ORDER — LORAZEPAM 1 MG/1
1 TABLET ORAL 2 TIMES DAILY
Qty: 180 TABLET | Refills: 0 | Status: SHIPPED | OUTPATIENT
Start: 2022-01-01 | End: 2022-01-01 | Stop reason: SDUPTHER

## 2022-01-01 RX ORDER — ENOXAPARIN SODIUM 100 MG/ML
30 INJECTION SUBCUTANEOUS DAILY
OUTPATIENT
Start: 2022-01-01

## 2022-01-01 RX ORDER — SIMVASTATIN 20 MG
20 TABLET ORAL NIGHTLY
Qty: 90 TABLET | Refills: 1 | Status: SHIPPED | OUTPATIENT
Start: 2022-01-01 | End: 2022-01-01 | Stop reason: SDUPTHER

## 2022-01-01 RX ORDER — FUROSEMIDE 20 MG/1
20 TABLET ORAL DAILY
Qty: 180 TABLET | Refills: 1 | Status: SHIPPED | OUTPATIENT
Start: 2022-01-01 | End: 2022-01-01 | Stop reason: SDUPTHER

## 2022-01-01 RX ADMIN — VASOPRESSIN 0.03 UNITS/MIN: 20 INJECTION, SOLUTION INTRAMUSCULAR; SUBCUTANEOUS at 19:43

## 2022-01-01 RX ADMIN — METRONIDAZOLE 500 MG: 500 INJECTION, SOLUTION INTRAVENOUS at 03:00

## 2022-01-01 RX ADMIN — Medication 100 MCG/MIN: at 01:21

## 2022-01-01 RX ADMIN — VASOPRESSIN 0.03 UNITS/MIN: 20 INJECTION, SOLUTION INTRAMUSCULAR; SUBCUTANEOUS at 03:52

## 2022-01-01 RX ADMIN — Medication 50 MCG/HR: at 21:22

## 2022-01-01 RX ADMIN — CEFEPIME HYDROCHLORIDE 1000 MG: 1 INJECTION, POWDER, FOR SOLUTION INTRAMUSCULAR; INTRAVENOUS at 12:59

## 2022-01-01 RX ADMIN — VECURONIUM BROMIDE 10 MG: 1 INJECTION, POWDER, LYOPHILIZED, FOR SOLUTION INTRAVENOUS at 16:00

## 2022-01-01 RX ADMIN — METRONIDAZOLE 500 MG: 500 INJECTION, SOLUTION INTRAVENOUS at 20:48

## 2022-01-01 RX ADMIN — SODIUM BICARBONATE 50 MEQ: 84 INJECTION, SOLUTION INTRAVENOUS at 22:29

## 2022-01-01 RX ADMIN — Medication 100 MCG/MIN: at 04:12

## 2022-01-01 RX ADMIN — SODIUM CHLORIDE, POTASSIUM CHLORIDE, SODIUM LACTATE AND CALCIUM CHLORIDE: 600; 310; 30; 20 INJECTION, SOLUTION INTRAVENOUS at 22:50

## 2022-01-01 RX ADMIN — Medication 100 MCG/MIN: at 09:38

## 2022-01-01 RX ADMIN — MEROPENEM AND SODIUM CHLORIDE 500 MG: 500 INJECTION, SOLUTION INTRAVENOUS at 06:02

## 2022-01-01 RX ADMIN — MIDAZOLAM HYDROCHLORIDE 2 MG/HR: 1 INJECTION, SOLUTION INTRAVENOUS at 21:21

## 2022-01-01 RX ADMIN — SODIUM CHLORIDE 914 ML: 9 INJECTION, SOLUTION INTRAVENOUS at 15:03

## 2022-01-01 RX ADMIN — Medication 1000 MG: at 13:01

## 2022-01-01 RX ADMIN — Medication 50 MEQ: at 00:05

## 2022-01-01 RX ADMIN — SODIUM CHLORIDE 1000 ML: 9 INJECTION, SOLUTION INTRAVENOUS at 12:57

## 2022-01-01 RX ADMIN — Medication 100 MCG/MIN: at 19:27

## 2022-01-01 RX ADMIN — SODIUM CHLORIDE, POTASSIUM CHLORIDE, SODIUM LACTATE AND CALCIUM CHLORIDE: 600; 310; 30; 20 INJECTION, SOLUTION INTRAVENOUS at 04:12

## 2022-01-01 RX ADMIN — SODIUM CHLORIDE, POTASSIUM CHLORIDE, SODIUM LACTATE AND CALCIUM CHLORIDE: 600; 310; 30; 20 INJECTION, SOLUTION INTRAVENOUS at 19:15

## 2022-01-01 RX ADMIN — Medication 100 MCG/MIN: at 21:57

## 2022-01-01 RX ADMIN — DEXMEDETOMIDINE HYDROCHLORIDE 0.2 MCG/KG/HR: 4 INJECTION, SOLUTION INTRAVENOUS at 16:14

## 2022-01-01 RX ADMIN — SODIUM CHLORIDE, POTASSIUM CHLORIDE, SODIUM LACTATE AND CALCIUM CHLORIDE 1000 ML: 600; 310; 30; 20 INJECTION, SOLUTION INTRAVENOUS at 20:00

## 2022-01-01 RX ADMIN — ETOMIDATE 20 MG: 2 INJECTION, SOLUTION INTRAVENOUS at 15:51

## 2022-01-01 RX ADMIN — Medication 100 MCG/MIN: at 06:50

## 2022-01-01 RX ADMIN — PHENYLEPHRINE HYDROCHLORIDE 300 MCG/MIN: 10 INJECTION INTRAVENOUS at 02:51

## 2022-01-01 RX ADMIN — PHENYLEPHRINE HYDROCHLORIDE 300 MCG/MIN: 10 INJECTION INTRAVENOUS at 00:03

## 2022-01-01 RX ADMIN — PHENYLEPHRINE HYDROCHLORIDE 300 MCG/MIN: 10 INJECTION INTRAVENOUS at 06:01

## 2022-01-01 RX ADMIN — PHENYLEPHRINE HYDROCHLORIDE 300 MCG/MIN: 10 INJECTION INTRAVENOUS at 20:46

## 2022-01-01 RX ADMIN — MEROPENEM AND SODIUM CHLORIDE 1000 MG: 1 INJECTION, SOLUTION INTRAVENOUS at 19:53

## 2022-01-01 RX ADMIN — PHENYLEPHRINE HYDROCHLORIDE 300 MCG/MIN: 10 INJECTION INTRAVENOUS at 09:39

## 2022-01-01 RX ADMIN — LORAZEPAM 2 MG: 2 INJECTION, SOLUTION INTRAMUSCULAR; INTRAVENOUS at 18:58

## 2022-01-01 RX ADMIN — SODIUM BICARBONATE 50 MEQ: 84 INJECTION, SOLUTION INTRAVENOUS at 00:05

## 2022-01-01 RX ADMIN — Medication 4 MILLICURIE: at 14:15

## 2022-01-01 RX ADMIN — SUCCINYLCHOLINE CHLORIDE 100 MG: 20 INJECTION, SOLUTION INTRAMUSCULAR; INTRAVENOUS at 15:51

## 2022-01-01 RX ADMIN — Medication 5 MCG/MIN: at 16:15

## 2022-01-01 SDOH — ECONOMIC STABILITY: FOOD INSECURITY: WITHIN THE PAST 12 MONTHS, THE FOOD YOU BOUGHT JUST DIDN'T LAST AND YOU DIDN'T HAVE MONEY TO GET MORE.: NEVER TRUE

## 2022-01-01 SDOH — ECONOMIC STABILITY: FOOD INSECURITY: WITHIN THE PAST 12 MONTHS, YOU WORRIED THAT YOUR FOOD WOULD RUN OUT BEFORE YOU GOT MONEY TO BUY MORE.: NEVER TRUE

## 2022-01-01 ASSESSMENT — PULMONARY FUNCTION TESTS
PIF_VALUE: 24
PIF_VALUE: 24
PIF_VALUE: 23
PIF_VALUE: 23
PIF_VALUE: 24
PIF_VALUE: 29
PIF_VALUE: 20
PIF_VALUE: 24
PIF_VALUE: 21
PIF_VALUE: 21
PIF_VALUE: 24
PIF_VALUE: 21
PIF_VALUE: 18
PIF_VALUE: 24
PIF_VALUE: 29
PIF_VALUE: 27
PIF_VALUE: 21
PIF_VALUE: 28
PIF_VALUE: 24
PIF_VALUE: 41
PIF_VALUE: 24
PIF_VALUE: 20
PIF_VALUE: 21
PIF_VALUE: 23

## 2022-01-01 ASSESSMENT — ENCOUNTER SYMPTOMS
VOMITING: 1
ABDOMINAL PAIN: 0
BACK PAIN: 1
CONSTIPATION: 0
WHEEZING: 0
COLOR CHANGE: 0
SORE THROAT: 0
COUGH: 0
ABDOMINAL PAIN: 0
SORE THROAT: 0
DIARRHEA: 0
DIARRHEA: 0
BLOOD IN STOOL: 0
EYE DISCHARGE: 0
STRIDOR: 0
COLOR CHANGE: 0
ABDOMINAL DISTENTION: 0
SHORTNESS OF BREATH: 0
COUGH: 0
EYE ITCHING: 0
WHEEZING: 0
VOMITING: 0
DIARRHEA: 0
SORE THROAT: 0
CONSTIPATION: 0
ABDOMINAL DISTENTION: 0
COUGH: 0
EYE ITCHING: 0
NAUSEA: 0
EYE DISCHARGE: 0
COLOR CHANGE: 0
STRIDOR: 0
DIARRHEA: 0
ABDOMINAL PAIN: 0
TROUBLE SWALLOWING: 0
EYE DISCHARGE: 0
NAUSEA: 0
CHOKING: 0
SHORTNESS OF BREATH: 0
TROUBLE SWALLOWING: 0
CONSTIPATION: 0
VOMITING: 0
STRIDOR: 0
TROUBLE SWALLOWING: 0
EYE ITCHING: 0
NAUSEA: 0
VOMITING: 0
NAUSEA: 1
ABDOMINAL DISTENTION: 0
CHOKING: 0
CHOKING: 0
WHEEZING: 0
BLOOD IN STOOL: 0
BLOOD IN STOOL: 0
SHORTNESS OF BREATH: 0

## 2022-01-01 ASSESSMENT — PATIENT HEALTH QUESTIONNAIRE - PHQ9
SUM OF ALL RESPONSES TO PHQ QUESTIONS 1-9: 0
SUM OF ALL RESPONSES TO PHQ9 QUESTIONS 1 & 2: 0
7. TROUBLE CONCENTRATING ON THINGS, SUCH AS READING THE NEWSPAPER OR WATCHING TELEVISION: 0
10. IF YOU CHECKED OFF ANY PROBLEMS, HOW DIFFICULT HAVE THESE PROBLEMS MADE IT FOR YOU TO DO YOUR WORK, TAKE CARE OF THINGS AT HOME, OR GET ALONG WITH OTHER PEOPLE: 0
SUM OF ALL RESPONSES TO PHQ QUESTIONS 1-9: 0
2. FEELING DOWN, DEPRESSED OR HOPELESS: 0
4. FEELING TIRED OR HAVING LITTLE ENERGY: 0
8. MOVING OR SPEAKING SO SLOWLY THAT OTHER PEOPLE COULD HAVE NOTICED. OR THE OPPOSITE, BEING SO FIGETY OR RESTLESS THAT YOU HAVE BEEN MOVING AROUND A LOT MORE THAN USUAL: 0
SUM OF ALL RESPONSES TO PHQ QUESTIONS 1-9: 0
6. FEELING BAD ABOUT YOURSELF - OR THAT YOU ARE A FAILURE OR HAVE LET YOURSELF OR YOUR FAMILY DOWN: 0
9. THOUGHTS THAT YOU WOULD BE BETTER OFF DEAD, OR OF HURTING YOURSELF: 0
5. POOR APPETITE OR OVEREATING: 0
SUM OF ALL RESPONSES TO PHQ QUESTIONS 1-9: 0
1. LITTLE INTEREST OR PLEASURE IN DOING THINGS: 0
3. TROUBLE FALLING OR STAYING ASLEEP: 0

## 2022-01-01 ASSESSMENT — LIFESTYLE VARIABLES: HOW OFTEN DO YOU HAVE A DRINK CONTAINING ALCOHOL: NEVER

## 2022-01-01 ASSESSMENT — SOCIAL DETERMINANTS OF HEALTH (SDOH): HOW HARD IS IT FOR YOU TO PAY FOR THE VERY BASICS LIKE FOOD, HOUSING, MEDICAL CARE, AND HEATING?: NOT HARD AT ALL

## 2022-01-24 NOTE — TELEPHONE ENCOUNTER
Misha Conde called to request a refill on his medication. Last office visit : 11/2/2021   Next office visit : 2/2/2022     Last UDS:   Amphetamine Screen, Urine   Date Value Ref Range Status   07/29/2021 NEG  Final     Barbiturate Screen, Urine   Date Value Ref Range Status   07/29/2021 NEG  Final     Benzodiazepine Screen, Urine   Date Value Ref Range Status   07/29/2021 NEG  Final     Buprenorphine Urine   Date Value Ref Range Status   07/29/2021 NEG  Final     Cocaine Metabolite Screen, Urine   Date Value Ref Range Status   07/29/2021 NEG  Final     Gabapentin Screen, Urine   Date Value Ref Range Status   07/29/2021 NEG  Final     MDMA, Urine   Date Value Ref Range Status   07/29/2021 NEG  Final     Methamphetamine, Urine   Date Value Ref Range Status   07/29/2021 NEG  Final     Opiate Scrn, Ur   Date Value Ref Range Status   07/29/2021 NEG  Final     Oxycodone Screen, Ur   Date Value Ref Range Status   07/29/2021 NEG  Final     PCP Screen, Urine   Date Value Ref Range Status   07/29/2021 NEG  Final     Propoxyphene Screen, Urine   Date Value Ref Range Status   07/29/2021 NEG  Final     THC Screen, Urine   Date Value Ref Range Status   07/29/2021 NEG  Final     Tricyclic Antidepressants, Urine   Date Value Ref Range Status   07/29/2021 NEG  Final       Last Grady Leland: 10/29/21  Medication Contract: 7/29/21    Requested Prescriptions     Pending Prescriptions Disp Refills    amLODIPine (NORVASC) 10 MG tablet 90 tablet 1     Sig: Take 1 tablet by mouth daily    simvastatin (ZOCOR) 20 MG tablet 90 tablet 3     Sig: Take 1 tablet by mouth nightly    gabapentin (NEURONTIN) 300 MG capsule 270 capsule 1     Sig: Take 1 capsule by mouth 3 times daily for 180 days.  LORazepam (ATIVAN) 1 MG tablet 60 tablet 2     Sig: Take 1 tablet by mouth 2 times daily for 90 days. Please approve or refuse this medication.    Mark Moon MA

## 2022-01-25 NOTE — TELEPHONE ENCOUNTER
Cheyenne Burch called to request a refill on his medication. Last office visit : 11/2/2021   Next office visit : 2/2/2022     Last UDS:   Amphetamine Screen, Urine   Date Value Ref Range Status   07/29/2021 NEG  Final     Barbiturate Screen, Urine   Date Value Ref Range Status   07/29/2021 NEG  Final     Benzodiazepine Screen, Urine   Date Value Ref Range Status   07/29/2021 NEG  Final     Buprenorphine Urine   Date Value Ref Range Status   07/29/2021 NEG  Final     Cocaine Metabolite Screen, Urine   Date Value Ref Range Status   07/29/2021 NEG  Final     Gabapentin Screen, Urine   Date Value Ref Range Status   07/29/2021 NEG  Final     MDMA, Urine   Date Value Ref Range Status   07/29/2021 NEG  Final     Methamphetamine, Urine   Date Value Ref Range Status   07/29/2021 NEG  Final     Opiate Scrn, Ur   Date Value Ref Range Status   07/29/2021 NEG  Final     Oxycodone Screen, Ur   Date Value Ref Range Status   07/29/2021 NEG  Final     PCP Screen, Urine   Date Value Ref Range Status   07/29/2021 NEG  Final     Propoxyphene Screen, Urine   Date Value Ref Range Status   07/29/2021 NEG  Final     THC Screen, Urine   Date Value Ref Range Status   07/29/2021 NEG  Final     Tricyclic Antidepressants, Urine   Date Value Ref Range Status   07/29/2021 NEG  Final       Last Nafisa Parjapati: 1/24/22  Medication Contract: 7/29/21    Requested Prescriptions     Pending Prescriptions Disp Refills    amLODIPine (NORVASC) 10 MG tablet 90 tablet 1     Sig: Take 1 tablet by mouth daily    gabapentin (NEURONTIN) 300 MG capsule 270 capsule 1     Sig: Take 1 capsule by mouth 3 times daily for 180 days.  LORazepam (ATIVAN) 1 MG tablet 180 tablet 1     Sig: Take 1 tablet by mouth 2 times daily for 90 days.     simvastatin (ZOCOR) 20 MG tablet 90 tablet 1     Sig: Take 1 tablet by mouth nightly    acetaminophen-codeine (TYLENOL #4) 300-60 MG per tablet 30 tablet 0     Sig: Take 1 tablet by mouth 4 times daily as needed for Pain for up to 30 days. Please approve or refuse this medication.    Reza Saldivar MA

## 2022-01-25 NOTE — TELEPHONE ENCOUNTER
Sterling Joshi called to request a refill on his medication. Last office visit : 11/2/2021   Next office visit : 2/2/2022     Last UDS:   Amphetamine Screen, Urine   Date Value Ref Range Status   07/29/2021 NEG  Final     Barbiturate Screen, Urine   Date Value Ref Range Status   07/29/2021 NEG  Final     Benzodiazepine Screen, Urine   Date Value Ref Range Status   07/29/2021 NEG  Final     Buprenorphine Urine   Date Value Ref Range Status   07/29/2021 NEG  Final     Cocaine Metabolite Screen, Urine   Date Value Ref Range Status   07/29/2021 NEG  Final     Gabapentin Screen, Urine   Date Value Ref Range Status   07/29/2021 NEG  Final     MDMA, Urine   Date Value Ref Range Status   07/29/2021 NEG  Final     Methamphetamine, Urine   Date Value Ref Range Status   07/29/2021 NEG  Final     Opiate Scrn, Ur   Date Value Ref Range Status   07/29/2021 NEG  Final     Oxycodone Screen, Ur   Date Value Ref Range Status   07/29/2021 NEG  Final     PCP Screen, Urine   Date Value Ref Range Status   07/29/2021 NEG  Final     Propoxyphene Screen, Urine   Date Value Ref Range Status   07/29/2021 NEG  Final     THC Screen, Urine   Date Value Ref Range Status   07/29/2021 NEG  Final     Tricyclic Antidepressants, Urine   Date Value Ref Range Status   07/29/2021 NEG  Final       Last Stacy Balboa: 1/24/22  Medication Contract: 7/29/21    Requested Prescriptions     Pending Prescriptions Disp Refills    amLODIPine (NORVASC) 10 MG tablet 90 tablet 1     Sig: Take 1 tablet by mouth daily    gabapentin (NEURONTIN) 300 MG capsule 270 capsule 1     Sig: Take 1 capsule by mouth 3 times daily for 180 days.  LORazepam (ATIVAN) 1 MG tablet 60 tablet 2     Sig: Take 1 tablet by mouth 2 times daily for 90 days.  oxyCODONE-acetaminophen (PERCOCET) 5-325 MG per tablet 18 tablet 0     Sig: Take 1 tablet by mouth every 4 hours as needed for Pain for up to 3 days. Intended supply: 3 days.  Take lowest dose possible to manage pain    simvastatin (ZOCOR) 20 MG tablet 90 tablet 3     Sig: Take 1 tablet by mouth nightly         Please approve or refuse this medication.    Sue Campos MA

## 2022-02-02 NOTE — PROGRESS NOTES
Formerly [facility] PHYSICIAN SERVICES  [facility] INTERNAL MEDICINE  [address] [address]  [address] [address]  Dept: [phone]  Dept Fax: [phone]: [phone]    Alina Ervin (:  1955) is a 73 y.o. male,Established patient  with green , here for evaluation of the following chief complaint(s): 3 Month Follow-Up      Alina Ervin is a 73 y.o. male who presents today for his medical conditions/complaints as noted below. Alina Ervin is c/kait 3 Month Follow-Up        HPI:     Chief Complaint   Patient presents with    3 Month Follow-Up     HPI   1.    Chronic osteoarthritis       Past Medical History:   Diagnosis Date    Anxiety and depression     Atypical chest pain     Elevated lipids     Gastritis     GERD (gastroesophageal reflux disease)     Hepatitis A     Hiatal hernia     History of kidney stones     HTN (hypertension)     Hyperlipidemia     Hypertension     Irregular Z line of esophagus     Libido, decreased     Migraines       Past Surgical History:   Procedure Laterality Date    CHOLECYSTECTOMY      [facility]    COLONOSCOPY      COLONOSCOPY  10-3-06    Dr Mary Mauro    COLONOSCOPY      Dr Shane Hutchins N/A 2021    Loisaace Fuel performed by Renuka Murillo DO at [facility] ARTHROSCOPY Bilateral     KNEE SURGERY Bilateral     2 X'S ON LEFT ONCE ON THE RIGHT    UPPER GASTROINTESTINAL ENDOSCOPY  2010     [facility]    UPPER GASTROINTESTINAL ENDOSCOPY      UPPER GASTROINTESTINAL ENDOSCOPY  01    Dr Marian Lopez  10-10-06    Dr Marian Lopez  11-18-10    Dr Fadi Stover 2022   SYSTOLIC [phone] - - DIASTOLIC 72 68 74 78 - -   Site - - Right Upper Arm Right Upper Arm - -   Position - - Sitting Sitting - -   Cuff Size - - Large Adult Large Adult - -   Pulse 79 80 - - - -   Temp - 98.1 97.3 97.6 97 -   Resp - - - - - -   SpO2 98 99 - - - 100   Weight 218 lb 11.2 oz 210 lb 213 lb 209 lb - -   Height 5' 6\" 5' 6\" 5' 6\" 5' 6\" - -   Body mass index 35.3 kg/m2 33.89 kg/m2 34.38 kg/m2 33.73 kg/m2 - -   Some recent data might be hidden       Family History   Problem Relation Age of Onset    Heart Disease Father     Heart Disease Sister     Heart Disease Brother     Stomach Cancer Mother     Cancer Sister         hodgkins    Cancer Mother        Social History     Tobacco Use    Smoking status: Never Smoker    Smokeless tobacco: Never Used   Substance Use Topics    Alcohol use: Yes     Comment: occ. Current Outpatient Medications   Medication Sig Dispense Refill    acetaminophen-codeine (TYLENOL #4) 300-60 MG per tablet Take 1 tablet by mouth 4 times daily as needed for Pain for up to 30 days. 120 tablet 0    amLODIPine (NORVASC) 10 MG tablet Take 1 tablet by mouth daily 90 tablet 1    gabapentin (NEURONTIN) 300 MG capsule Take 1 capsule by mouth 3 times daily for 180 days. 270 capsule 1    LORazepam (ATIVAN) 1 MG tablet Take 1 tablet by mouth 2 times daily for 90 days.  180 tablet 1    simvastatin (ZOCOR) 20 MG tablet Take 1 tablet by mouth nightly 90 tablet 1    irbesartan (AVAPRO) 300 MG tablet Take 1 tablet by mouth nightly 90 tablet 3    meclizine (ANTIVERT) 25 MG tablet Take 1 tablet by mouth 3 times daily as needed for Dizziness 270 tablet 1    furosemide (LASIX) 20 MG tablet Take 1 tablet by mouth daily 180 tablet 3    potassium chloride (KLOR-CON M) 20 MEQ extended release tablet Take 20 mEq by mouth every other day      buPROPion (WELLBUTRIN) 75 MG tablet Take 75 mg by mouth daily as needed      cloNIDine (CATAPRES) 0.1 MG tablet Take 1 tablet by mouth as needed for High Blood Pressure 180 tablet 3     No current facility-administered medications for this visit. Allergies   Allergen Reactions    Nitroglycerin Other (See Comments)     Blood pressure problems, very bad, happened in [place] [facility]. Blood pressure problems, very bad, happened in [place] [facility]. Blood pressure problems, very bad, happened in [place] [facility].        Health Maintenance   Topic Date Due    COVID-19 Vaccine (3 - Booster for Moderna series) 08/16/2021    Pneumococcal 65+ years Vaccine (1 of 1 - PPSV23) 07/28/2022 (Originally 6/5/2021)    Shingles Vaccine (1 of 2) 07/29/2022 (Originally 6/5/2006)    Lipid screen  07/28/2022    Potassium monitoring  07/28/2022    Creatinine monitoring  07/28/2022    Depression Screen  07/29/2022    Annual Wellness Visit (AWV)  07/30/2022    DTaP/Tdap/Td vaccine (2 - Td or Tdap) 10/14/2029    Colon cancer screen colonoscopy  09/07/2031    Flu vaccine  Completed    Hepatitis C screen  Completed    HIV screen  Completed    Hepatitis A vaccine  Aged Out    Hepatitis B vaccine  Aged Out    Hib vaccine  Aged Out    Meningococcal (ACWY) vaccine  Aged Out       No results found for: LABA1C  Lab Results   Component Value Date    PSA 3.50 08/19/2020    PSA 1.71 01/14/2019     TSH   Date Value Ref Range Status   07/28/2021 1.430 0.270 - 4.200 uIU/mL Final   ]  Lab Results   Component Value Date     07/28/2021    K 3.8 07/28/2021     07/28/2021    CO2 30 (H) 07/28/2021    BUN 10 07/28/2021    CREATININE 0.8 07/28/2021    GLUCOSE 92 07/28/2021    CALCIUM 9.5 07/28/2021    PROT 7.1 07/28/2021    LABALBU 4.3 07/28/2021    BILITOT 0.6 07/28/2021    ALKPHOS 87 07/28/2021    AST 29 07/28/2021    ALT 36 07/28/2021    LABGLOM >60 07/28/2021    GFRAA >59 07/28/2021     Lab Results   Component Value Date    CHOL 187 07/28/2021    CHOL 132 (L) 08/19/2020    CHOL 220 (H) 01/14/2019     Lab Results   Component Value Date    TRIG 117 07/29/2021    TRIG 340 (H) 07/28/2021    TRIG 146 08/19/2020 Lab Results   Component Value Date    HDL 72 07/28/2021    HDL 40 (L) 08/19/2020    HDL 87 01/14/2019     Lab Results   Component Value Date    LDLCALC 47 07/28/2021    LDLCALC 63 08/19/2020    LDLCALC 80 01/14/2019     Lab Results   Component Value Date     07/28/2021     04/08/2011    K 3.8 07/28/2021    K 4.2 04/08/2011     07/28/2021     04/08/2011    CO2 30 07/28/2021    BUN 10 07/28/2021    CREATININE 0.8 07/28/2021    CREATININE 1.0 04/08/2011    GLUCOSE 92 07/28/2021    CALCIUM 9.5 07/28/2021      Lab Results   Component Value Date    WBC 7.1 07/28/2021    HGB 15.6 07/28/2021    HCT 45.3 07/28/2021    .7 (H) 07/28/2021     07/28/2021    LABLYMP 2.15 02/11/2015    LYMPHOPCT 39.6 07/28/2021    RBC 4.50 (L) 07/28/2021    MCH 34.7 (H) 07/28/2021    MCHC 34.4 07/28/2021    RDW 13.5 07/28/2021     Lab Results   Component Value Date    VITD25 83.1 07/28/2021       Subjective:      Review of Systems   Constitutional: Negative for fatigue, fever and unexpected weight change. HENT: Negative for ear discharge, ear pain, mouth sores, sore throat and trouble swallowing. Eyes: Negative for discharge, itching and visual disturbance. Respiratory: Negative for cough, choking, shortness of breath, wheezing and stridor. Cardiovascular: Negative for chest pain, palpitations and leg swelling. Gastrointestinal: Negative for abdominal distention, abdominal pain, blood in stool, constipation, diarrhea, nausea and vomiting. Endocrine: Negative for cold intolerance, polydipsia and polyuria. Genitourinary: Negative for difficulty urinating, dysuria, frequency and urgency. Musculoskeletal: Positive for arthralgias. Negative for gait problem. Skin: Negative for color change and rash. Allergic/Immunologic: Negative for food allergies and immunocompromised state. Neurological: Negative for dizziness, tremors, syncope, speech difficulty, weakness and headaches. Hematological: Negative for adenopathy. Does not bruise/bleed easily. Psychiatric/Behavioral: Negative for confusion and hallucinations. Objective:     Physical Exam  Constitutional:       General: He is not in acute distress. Appearance: He is well-developed. HENT:      Head: Normocephalic and atraumatic. Eyes:      General: No scleral icterus. Right eye: No discharge. Left eye: No discharge. Pupils: Pupils are equal, round, and reactive to light. Neck:      Thyroid: No thyromegaly. Vascular: No JVD. Cardiovascular:      Rate and Rhythm: Normal rate and regular rhythm. Heart sounds: Normal heart sounds. No murmur heard. Pulmonary:      Effort: Pulmonary effort is normal. No respiratory distress. Breath sounds: Normal breath sounds. No wheezing or rales. Abdominal:      General: Bowel sounds are normal. There is no distension. Palpations: Abdomen is soft. There is no mass. Tenderness: There is no abdominal tenderness. There is no guarding or rebound. Musculoskeletal:         General: No tenderness. Normal range of motion. Cervical back: Normal range of motion and neck supple. Skin:     General: Skin is warm and dry. Findings: No erythema or rash. Neurological:      Mental Status: He is alert and oriented to person, place, and time. Cranial Nerves: No cranial nerve deficit. Coordination: Coordination normal.      Deep Tendon Reflexes: Reflexes are normal and symmetric. Reflexes normal.   Psychiatric:         Mood and Affect: Mood is not depressed. Behavior: Behavior normal.         Thought Content:  Thought content normal.         Judgment: Judgment normal.       /72   Pulse 79   Ht 5' 6\" (1.676 m)   Wt 218 lb 11.2 oz (99.2 kg)   SpO2 98%   BMI 35.30 kg/m²           Assessment:      Problem List     Chronic pain syndrome    Relevant Medications    buPROPion (WELLBUTRIN) 75 MG tablet    gabapentin (NEURONTIN) 300 MG capsule    acetaminophen-codeine (TYLENOL #4) 300-60 MG per tablet          Plan:        Patient given educational materials - see patient instructions. Discussed use, benefit, and side effects of prescribed medications. Allpatient questions answered. Pt voiced understanding. Reviewed health maintenance. Instructed to continue current medications, diet and exercise. Patient agreed with treatment plan. Follow up as directed. MEDICATIONS:  Orders Placed This Encounter   Medications    acetaminophen-codeine (TYLENOL #4) 300-60 MG per tablet     Sig: Take 1 tablet by mouth 4 times daily as needed for Pain for up to 30 days. Dispense:  120 tablet     Refill:  0     Reduce doses taken as pain becomes manageable         ORDERS:  No orders of the defined types were placed in this encounter. Follow-up:  No follow-ups on file. PATIENT INSTRUCTIONS:  There are no Patient Instructions on file for this visit. Electronically signed by EMILIA Duenas on 2/2/2022 at 8:59 AM    @On this date 2/2/2022 I have spent 30 minutes reviewing previous notes, test results and face to face with the patient discussing the diagnosis and importance of compliance with the treatment plan as well as documenting on the day of the visit. EMR[employer]agon/transcription disclaimer:  Much of this encounter note is electronic transcription/translation of spoken language to printed texts. The electronic translation of spoken language may be erroneous, or at times,nonsensical words or phrases may be inadvertently transcribed.   Although I have reviewed the note for such errors, some may still exist.

## 2022-04-18 NOTE — TELEPHONE ENCOUNTER
TISHA 1/24/22 7/29/21  Jen called requesting a refill of the below medication which has been pended for you:     Requested Prescriptions     Pending Prescriptions Disp Refills    furosemide (LASIX) 20 MG tablet 180 tablet 1     Sig: Take 1 tablet by mouth daily    irbesartan (AVAPRO) 300 MG tablet 90 tablet 1     Sig: Take 1 tablet by mouth nightly    amLODIPine (NORVASC) 10 MG tablet 90 tablet 1     Sig: Take 1 tablet by mouth daily    LORazepam (ATIVAN) 1 MG tablet 180 tablet 1     Sig: Take 1 tablet by mouth 2 times daily for 90 days.  simvastatin (ZOCOR) 20 MG tablet 90 tablet 1     Sig: Take 1 tablet by mouth nightly       Last Appointment Date: 2/2/2022  Next Appointment Date: 5/3/2022    Allergies   Allergen Reactions    Nitroglycerin Other (See Comments)     Blood pressure problems, very bad, happened in [place] [facility]. Blood pressure problems, very bad, happened in [place] [facility]. Blood pressure problems, very bad, happened in [place] [facility].

## 2022-04-24 ENCOUNTER — TRANSCRIBE ORDERS (OUTPATIENT)
Dept: ADMINISTRATIVE | Facility: HOSPITAL | Age: 67
End: 2022-04-24

## 2022-04-24 DIAGNOSIS — Z01.812 ENCOUNTER FOR PREPROCEDURAL LABORATORY EXAMINATION: ICD-10-CM

## 2022-04-24 DIAGNOSIS — M17.12 UNILATERAL PRIMARY OSTEOARTHRITIS, LEFT KNEE: ICD-10-CM

## 2022-04-24 DIAGNOSIS — E78.5 HYPERLIPIDEMIA, UNSPECIFIED HYPERLIPIDEMIA TYPE: Primary | ICD-10-CM

## 2022-04-24 DIAGNOSIS — I10 ESSENTIAL (PRIMARY) HYPERTENSION: ICD-10-CM

## 2022-04-24 DIAGNOSIS — Z01.810 ENCOUNTER FOR PREPROCEDURAL CARDIOVASCULAR EXAMINATION: ICD-10-CM

## 2022-04-24 DIAGNOSIS — Z01.811 ENCOUNTER FOR PREPROCEDURAL RESPIRATORY EXAMINATION: ICD-10-CM

## 2022-05-02 ENCOUNTER — LAB (OUTPATIENT)
Dept: LAB | Facility: HOSPITAL | Age: 67
End: 2022-05-02

## 2022-05-02 ENCOUNTER — HOSPITAL ENCOUNTER (OUTPATIENT)
Dept: GENERAL RADIOLOGY | Facility: HOSPITAL | Age: 67
Discharge: HOME | End: 2022-05-02

## 2022-05-02 ENCOUNTER — HOSPITAL ENCOUNTER (OUTPATIENT)
Dept: CARDIOLOGY | Facility: HOSPITAL | Age: 67
Discharge: HOME | End: 2022-05-02

## 2022-05-02 DIAGNOSIS — Z01.811 ENCOUNTER FOR PREPROCEDURAL RESPIRATORY EXAMINATION: ICD-10-CM

## 2022-05-02 DIAGNOSIS — Z01.812 ENCOUNTER FOR PREPROCEDURAL LABORATORY EXAMINATION: ICD-10-CM

## 2022-05-02 DIAGNOSIS — M17.12 UNILATERAL PRIMARY OSTEOARTHRITIS, LEFT KNEE: ICD-10-CM

## 2022-05-02 DIAGNOSIS — Z01.810 ENCOUNTER FOR PREPROCEDURAL CARDIOVASCULAR EXAMINATION: ICD-10-CM

## 2022-05-02 DIAGNOSIS — E78.5 HYPERLIPIDEMIA, UNSPECIFIED HYPERLIPIDEMIA TYPE: ICD-10-CM

## 2022-05-02 DIAGNOSIS — I10 ESSENTIAL (PRIMARY) HYPERTENSION: ICD-10-CM

## 2022-05-02 LAB
ALBUMIN SERPL-MCNC: 4.6 G/DL (ref 3.5–5.2)
ALBUMIN/GLOB SERPL: 1.9 G/DL
ALP SERPL-CCNC: 94 U/L (ref 39–117)
ALT SERPL W P-5'-P-CCNC: 22 U/L (ref 1–41)
ANION GAP SERPL CALCULATED.3IONS-SCNC: 12 MMOL/L (ref 5–15)
APTT PPP: 29.5 SECONDS (ref 24.1–35)
AST SERPL-CCNC: 19 U/L (ref 1–40)
BASOPHILS # BLD AUTO: 0.03 10*3/MM3 (ref 0–0.2)
BASOPHILS NFR BLD AUTO: 0.5 % (ref 0–1.5)
BILIRUB SERPL-MCNC: 0.5 MG/DL (ref 0–1.2)
BUN SERPL-MCNC: 11 MG/DL (ref 8–23)
BUN/CREAT SERPL: 14.7 (ref 7–25)
CALCIUM SPEC-SCNC: 9.6 MG/DL (ref 8.6–10.5)
CHLORIDE SERPL-SCNC: 105 MMOL/L (ref 98–107)
CO2 SERPL-SCNC: 28 MMOL/L (ref 22–29)
CREAT SERPL-MCNC: 0.75 MG/DL (ref 0.76–1.27)
DEPRECATED RDW RBC AUTO: 46.9 FL (ref 37–54)
EGFRCR SERPLBLD CKD-EPI 2021: 100.1 ML/MIN/1.73
EOSINOPHIL # BLD AUTO: 0.1 10*3/MM3 (ref 0–0.4)
EOSINOPHIL NFR BLD AUTO: 1.7 % (ref 0.3–6.2)
ERYTHROCYTE [DISTWIDTH] IN BLOOD BY AUTOMATED COUNT: 13.1 % (ref 12.3–15.4)
GLOBULIN UR ELPH-MCNC: 2.4 GM/DL
GLUCOSE SERPL-MCNC: 102 MG/DL (ref 65–99)
HCT VFR BLD AUTO: 44.5 % (ref 37.5–51)
HGB BLD-MCNC: 15.2 G/DL (ref 13–17.7)
IMM GRANULOCYTES # BLD AUTO: 0.02 10*3/MM3 (ref 0–0.05)
IMM GRANULOCYTES NFR BLD AUTO: 0.3 % (ref 0–0.5)
INR PPP: 0.91 (ref 0.91–1.09)
LYMPHOCYTES # BLD AUTO: 2.37 10*3/MM3 (ref 0.7–3.1)
LYMPHOCYTES NFR BLD AUTO: 40.4 % (ref 19.6–45.3)
MCH RBC QN AUTO: 33.5 PG (ref 26.6–33)
MCHC RBC AUTO-ENTMCNC: 34.2 G/DL (ref 31.5–35.7)
MCV RBC AUTO: 98 FL (ref 79–97)
MONOCYTES # BLD AUTO: 0.47 10*3/MM3 (ref 0.1–0.9)
MONOCYTES NFR BLD AUTO: 8 % (ref 5–12)
NEUTROPHILS NFR BLD AUTO: 2.87 10*3/MM3 (ref 1.7–7)
NEUTROPHILS NFR BLD AUTO: 49.1 % (ref 42.7–76)
NRBC BLD AUTO-RTO: 0 /100 WBC (ref 0–0.2)
PLATELET # BLD AUTO: 243 10*3/MM3 (ref 140–450)
PMV BLD AUTO: 10.3 FL (ref 6–12)
POTASSIUM SERPL-SCNC: 4 MMOL/L (ref 3.5–5.2)
PROT SERPL-MCNC: 7 G/DL (ref 6–8.5)
PROTHROMBIN TIME: 11.9 SECONDS (ref 11.9–14.6)
RBC # BLD AUTO: 4.54 10*6/MM3 (ref 4.14–5.8)
SODIUM SERPL-SCNC: 145 MMOL/L (ref 136–145)
WBC NRBC COR # BLD: 5.86 10*3/MM3 (ref 3.4–10.8)

## 2022-05-02 PROCEDURE — 36415 COLL VENOUS BLD VENIPUNCTURE: CPT

## 2022-05-02 PROCEDURE — 85610 PROTHROMBIN TIME: CPT

## 2022-05-02 PROCEDURE — 80053 COMPREHEN METABOLIC PANEL: CPT

## 2022-05-02 PROCEDURE — 71046 X-RAY EXAM CHEST 2 VIEWS: CPT

## 2022-05-02 PROCEDURE — 93005 ELECTROCARDIOGRAM TRACING: CPT | Performed by: ORTHOPAEDIC SURGERY

## 2022-05-02 PROCEDURE — 87081 CULTURE SCREEN ONLY: CPT

## 2022-05-02 PROCEDURE — 85025 COMPLETE CBC W/AUTO DIFF WBC: CPT

## 2022-05-02 PROCEDURE — 85730 THROMBOPLASTIN TIME PARTIAL: CPT

## 2022-05-02 PROCEDURE — 81003 URINALYSIS AUTO W/O SCOPE: CPT

## 2022-05-02 PROCEDURE — 93010 ELECTROCARDIOGRAM REPORT: CPT | Performed by: INTERNAL MEDICINE

## 2022-05-03 PROBLEM — F33.41 RECURRENT MAJOR DEPRESSIVE DISORDER, IN PARTIAL REMISSION (HCC): Status: ACTIVE | Noted: 2022-01-01

## 2022-05-03 PROBLEM — R97.20 ELEVATED PSA: Status: ACTIVE | Noted: 2022-01-01

## 2022-05-03 LAB
BILIRUB UR QL STRIP: NEGATIVE
CLARITY UR: CLEAR
COLOR UR: YELLOW
GLUCOSE UR STRIP-MCNC: NEGATIVE MG/DL
HGB UR QL STRIP.AUTO: NEGATIVE
KETONES UR QL STRIP: ABNORMAL
LEUKOCYTE ESTERASE UR QL STRIP.AUTO: NEGATIVE
NITRITE UR QL STRIP: NEGATIVE
PH UR STRIP.AUTO: 6 [PH] (ref 5–8)
PROT UR QL STRIP: NEGATIVE
QT INTERVAL: 408 MS
QTC INTERVAL: 393 MS
SP GR UR STRIP: 1.02 (ref 1–1.03)
UROBILINOGEN UR QL STRIP: ABNORMAL

## 2022-05-03 NOTE — ASSESSMENT & PLAN NOTE
Levels are a little bit with cholesterol 214 triglycerides 156 we will increase the Zocor to 40 at bedtime

## 2022-05-03 NOTE — PATIENT INSTRUCTIONS
#1 hyperlipidemia increase Zocor to 40 at bedtime repeat labs 6 months  J hypertension stable on current dose of meds no changes are necessary  3. Elevated PSA we are referring to urology someone will call you with that appt   4. Anxiety stable with current dose of lorazepam no changes  5. Depression stable on current dose of Wellbutrin  6. Neuropathy stable with current dose of gabapentin  7.   Chronic pain syndrome;  Stable with tylenol #3

## 2022-05-03 NOTE — PROGRESS NOTES
Formerly [facility] PHYSICIAN SERVICES  [facility] INTERNAL MEDICINE  [address] [facility] [address] [address]  Dept: [phone]  Dept Fax: [phone]: [phone]    Serjio Jewell (:  1955) is a 73 y.o. male,Established patient  with green, here for evaluation of the following chief complaint(s): 3 Month Follow-Up      Serjio Jewell is a 73 y.o. male who presents today for his medical conditions/complaints as noted below. Serjio Jewell is c/kait 3 Month Follow-Up        HPI:     Chief Complaint   Patient presents with    3 Month Follow-Up     HPI   1. Elevated PSA; Over 50 with last year normal at 3.5  2. Hypertension   he is on amlodipine 10 mg daily clonidine as needed Lasix 20 daily irbesartan 300 at bedtime  #3 hyperlipidemia his cholesterol is 214 triglycerides are 156 he is on Zocor 20 at bedtime  #4 anxiety he is on Ativan 1 mg twice daily as needed  5. Depression he is stable on Wellbutrin 75 daily  6. Peripheral neuropathy he is stable with gabapentin 303 times a day\  7. Chronic back pain; And knee pain ; has left TKA pendind   Controlled Substance Monitoring:    Acute and Chronic Pain Monitoring:   RX Monitoring 2021   Attestation The Prescription Monitoring Report for this patient was reviewed today. Periodic Controlled Substance Monitoring Possible medication side effects, risk of tolerance/dependence & alternative treatments discussed. ;No signs of potential drug abuse or diversion identified. Chronic Pain > 50 MEDD Re-evaluated the status of the patient's underlying condition causing pain.        CONTROLLED SUBSTANCE  Drug lorazepam, gabapentin  Diagnosis anxiety, neuropathy   last Myra Greaser 2022  Last UDS 2021  Pain contract last date 2021  Effective dose   yes      Changes this visit   none     Past Medical History:   Diagnosis Date    Anxiety and depression     Atypical chest pain     Elevated lipids     Gastritis     GERD (gastroesophageal reflux disease)     Hepatitis A     Hiatal hernia     History of kidney stones     HTN (hypertension)     Hyperlipidemia     Hypertension     Irregular Z line of esophagus     Libido, decreased     Migraines       Past Surgical History:   Procedure Laterality Date    CHOLECYSTECTOMY      [facility]    COLONOSCOPY      COLONOSCOPY  10-3-06    Dr Bruna Verde  36-43-05    Dr Jeffery Li N/A 8/9/2021    Kike Rojas performed by Edmundo Hendricks DO at [facility] ARTHROSCOPY Bilateral     KNEE SURGERY Bilateral     2 X'S ON LEFT ONCE ON THE RIGHT    UPPER GASTROINTESTINAL ENDOSCOPY  11-     [facility]    UPPER GASTROINTESTINAL ENDOSCOPY      UPPER GASTROINTESTINAL ENDOSCOPY  8-30-01    Dr Bassam Dawson  10-10-06    Dr Bassam Dawson  11-18-10    Dr Bragg [facility] 5/3/2022 2/2/2022 11/2/2021 9/17/2021 8/20/2021 5/9/6265   SYSTOLIC 705 [phone] -   DIASTOLIC 78 72 68 74 78 -   Site - - - Right Upper Arm Right Upper Arm -   Position - - - Sitting Sitting -   Cuff Size - - - Large Adult Large Adult -   Pulse 68 79 80 - - -   Temp - - 98.1 97.3 97.6 97   Resp - - - - - -   SpO2 98 98 99 - - -   Weight 215 lb 218 lb 11.2 oz 210 lb 213 lb 209 lb -   Height 5' 6\" 5' 6\" 5' 6\" 5' 6\" 5' 6\" -   Body mass index 34.7 kg/m2 35.3 kg/m2 33.89 kg/m2 34.38 kg/m2 33.73 kg/m2 -   Some recent data might be hidden       Family History   Problem Relation Age of Onset    Heart Disease Father     Heart Disease Sister     Heart Disease Brother     Stomach Cancer Mother     Cancer Sister         hodgkins    Cancer Mother        Social History     Tobacco Use    Smoking status: Never Smoker    Smokeless tobacco: Never Used   Substance Use Topics    Alcohol use: Yes     Comment: occ. Current Outpatient Medications   Medication Sig Dispense Refill    simvastatin (ZOCOR) 40 MG tablet Take 0.5 tablets by mouth nightly 90 tablet 1    acetaminophen-codeine (TYLENOL #4) 300-60 MG per tablet Take 1 tablet by mouth 4 times daily as needed for Pain for up to 30 days. 120 tablet 0    furosemide (LASIX) 20 MG tablet Take 1 tablet by mouth daily 180 tablet 1    irbesartan (AVAPRO) 300 MG tablet Take 1 tablet by mouth nightly 90 tablet 1    amLODIPine (NORVASC) 10 MG tablet Take 1 tablet by mouth daily 90 tablet 1    LORazepam (ATIVAN) 1 MG tablet Take 1 tablet by mouth 2 times daily for 90 days. 180 tablet 1    meclizine (ANTIVERT) 25 MG tablet Take 1 tablet by mouth 3 times daily as needed for Dizziness 270 tablet 1    potassium chloride (KLOR-CON M) 20 MEQ extended release tablet Take 20 mEq by mouth every other day      buPROPion (WELLBUTRIN) 75 MG tablet Take 75 mg by mouth daily as needed      cloNIDine (CATAPRES) 0.1 MG tablet Take 1 tablet by mouth as needed for High Blood Pressure 180 tablet 3     No current facility-administered medications for this visit. Allergies   Allergen Reactions    Nitroglycerin Other (See Comments)     Blood pressure problems, very bad, happened in [facility] ER. Blood pressure problems, very bad, happened in [facility] ER. Blood pressure problems, very bad, happened in [facility] ER.        Health Maintenance   Topic Date Due    COVID-19 Vaccine (3 - Booster for Moderna series) 08/16/2021    Pneumococcal 65+ years Vaccine (1 - PCV) 07/28/2022 (Originally 6/5/2021)    Shingles vaccine (1 of 2) 07/29/2022 (Originally 6/5/2006)    Diabetes screen  05/03/2023 (Originally 6/5/1991)    Depression Monitoring  07/29/2022    Annual Wellness Visit (AWV)  07/30/2022    Lipids  04/27/2023    Potassium  04/27/2023    Creatinine  04/27/2023    Prostate Specific Antigen (PSA) Screening or Monitoring  04/27/2023    DTaP/Tdap/Td vaccine (2 - Td or Tdap) 10/14/2029    Colorectal Cancer Screen  09/07/2031    Flu vaccine  Completed    Hepatitis C screen  Completed    HIV screen  Completed    Hepatitis A vaccine  Aged Out    Hepatitis B vaccine  Aged Out    Hib vaccine  Aged Out    Meningococcal (ACWY) vaccine  Aged Out       No results found for: LABA1C  Lab Results   Component Value Date    PSA 52.30 (H) 04/27/2022    PSA 3.50 08/19/2020    PSA 1.71 01/14/2019     TSH   Date Value Ref Range Status   07/28/2021 1.430 0.270 - 4.200 uIU/mL Final   ]  Lab Results   Component Value Date     04/27/2022    K 4.0 04/27/2022     04/27/2022    CO2 29 04/27/2022    BUN 11 04/27/2022    CREATININE 0.9 04/27/2022    GLUCOSE 111 (H) 04/27/2022    CALCIUM 9.6 04/27/2022    PROT 7.1 04/27/2022    LABALBU 4.7 04/27/2022    BILITOT 0.6 04/27/2022    ALKPHOS 100 04/27/2022    AST 21 04/27/2022    ALT 27 04/27/2022    LABGLOM >60 04/27/2022    GFRAA >59 04/27/2022     Lab Results   Component Value Date    CHOL 214 (H) 04/27/2022    CHOL 187 07/28/2021    CHOL 132 (L) 08/19/2020     Lab Results   Component Value Date    TRIG 156 (H) 04/27/2022    TRIG 117 07/29/2021    TRIG 340 (H) 07/28/2021     Lab Results   Component Value Date    HDL 67 04/27/2022    HDL 72 07/28/2021    HDL 40 (L) 08/19/2020     Lab Results   Component Value Date    LDLCALC 116 04/27/2022    LDLCALC 47 07/28/2021    LDLCALC 63 08/19/2020     Lab Results   Component Value Date     04/27/2022     04/08/2011    K 4.0 04/27/2022    K 4.2 04/08/2011     04/27/2022     04/08/2011    CO2 29 04/27/2022    BUN 11 04/27/2022    CREATININE 0.9 04/27/2022    CREATININE 1.0 04/08/2011    GLUCOSE 111 04/27/2022    CALCIUM 9.6 04/27/2022      Lab Results   Component Value Date    WBC 6.0 04/27/2022    HGB 16.0 04/27/2022    HCT 48.2 04/27/2022    .2 (H) 04/27/2022     04/27/2022    LABLYMP 2.15 02/11/2015    LYMPHOPCT 38.0 04/27/2022    RBC 4.81 04/27/2022    MCH 33.3 (H) 04/27/2022    MCHC 33.2 04/27/2022    RDW 13.3 04/27/2022     Lab Results   Component Value Date    VITD25 83.1 07/28/2021     Labs reviewed from 4/27/2022    Subjective:      Review of Systems   Constitutional: Negative for fatigue, fever and unexpected weight change. HENT: Negative for ear discharge, ear pain, mouth sores, sore throat and trouble swallowing. Eyes: Negative for discharge, itching and visual disturbance. Respiratory: Negative for cough, choking, shortness of breath, wheezing and stridor. Cardiovascular: Negative for chest pain, palpitations and leg swelling. Gastrointestinal: Negative for abdominal distention, abdominal pain, blood in stool, constipation, diarrhea, nausea and vomiting. Endocrine: Negative for cold intolerance, polydipsia and polyuria. Genitourinary: Negative for difficulty urinating, dysuria, frequency and urgency. Musculoskeletal: Negative for arthralgias and gait problem. Skin: Negative for color change and rash. Allergic/Immunologic: Negative for food allergies and immunocompromised state. Neurological: Negative for dizziness, tremors, syncope, speech difficulty, weakness and headaches. Neuropathy   Hematological: Negative for adenopathy. Does not bruise/bleed easily. Psychiatric/Behavioral: Negative for confusion and hallucinations. The patient is nervous/anxious. Depression       Objective:     Physical Exam  Constitutional:       General: He is not in acute distress. Appearance: He is well-developed. HENT:      Head: Normocephalic and atraumatic. Eyes:      General: No scleral icterus. Right eye: No discharge. Left eye: No discharge. Pupils: Pupils are equal, round, and reactive to light. Neck:      Thyroid: No thyromegaly. Vascular: No JVD. Cardiovascular:      Rate and Rhythm: Normal rate and regular rhythm.       Heart sounds: Normal heart sounds. No murmur heard. Pulmonary:      Effort: Pulmonary effort is normal. No respiratory distress. Breath sounds: Normal breath sounds. No wheezing or rales. Abdominal:      General: Bowel sounds are normal. There is no distension. Palpations: Abdomen is soft. There is no mass. Tenderness: There is no abdominal tenderness. There is no guarding or rebound. Musculoskeletal:         General: No tenderness. Normal range of motion. Cervical back: Normal range of motion and neck supple. Skin:     General: Skin is warm and dry. Findings: No erythema or rash. Neurological:      Mental Status: He is alert and oriented to person, place, and time. Cranial Nerves: No cranial nerve deficit. Coordination: Coordination normal.      Deep Tendon Reflexes: Reflexes are normal and symmetric. Reflexes normal.   Psychiatric:         Mood and Affect: Mood is not depressed. Behavior: Behavior normal.         Thought Content: Thought content normal.         Judgment: Judgment normal.       /78   Pulse 68   Ht 5' 6\" (1.676 m)   Wt 215 lb (97.5 kg)   SpO2 98%   BMI 34.70 kg/m²           Assessment:      Problem List     Anxiety     Stable with current dose of lorazepam no changes          Relevant Medications    buPROPion (WELLBUTRIN) 75 MG tablet    LORazepam (ATIVAN) 1 MG tablet    Benign essential HTN     Stable with amlodipine 10.   Clonidine Lasix 20 irbesartan 300          Chronic pain syndrome    Relevant Medications    buPROPion (WELLBUTRIN) 75 MG tablet    acetaminophen-codeine (TYLENOL #4) 300-60 MG per tablet    Elevated PSA     His PSA has risen to 52 we will go ahead and do a urology referral          Relevant Orders    External Referral To Urology    Mixed hyperlipidemia     Levels are a little bit with cholesterol 214 triglycerides 156 we will increase the Zocor to 40 at bedtime          Relevant Medications    cloNIDine (CATAPRES) 0.1 MG tablet    furosemide (LASIX) 20 MG tablet    irbesartan (AVAPRO) 300 MG tablet    amLODIPine (NORVASC) 10 MG tablet    simvastatin (ZOCOR) 40 MG tablet    Peripheral polyneuropathy     He is taking gabapentin 303 times a day and is stable with that dose          Relevant Medications    buPROPion (WELLBUTRIN) 75 MG tablet    LORazepam (ATIVAN) 1 MG tablet    Recurrent major depressive disorder, in partial remission (HCC)     Stable on current dose of Wellbutrin 75 daily          Relevant Medications    buPROPion (WELLBUTRIN) 75 MG tablet    LORazepam (ATIVAN) 1 MG tablet          Plan:        Patient given educational materials - see patient instructions. Discussed use, benefit, and side effects of prescribed medications. Allpatient questions answered. Pt voiced understanding. Reviewed health maintenance. Instructed to continue current medications, diet and exercise. Patient agreed with treatment plan. Follow up as directed. MEDICATIONS:  Orders Placed This Encounter   Medications    simvastatin (ZOCOR) 40 MG tablet     Sig: Take 0.5 tablets by mouth nightly     Dispense:  90 tablet     Refill:  1    acetaminophen-codeine (TYLENOL #4) 300-60 MG per tablet     Sig: Take 1 tablet by mouth 4 times daily as needed for Pain for up to 30 days. Dispense:  120 tablet     Refill:  0     Reduce doses taken as pain becomes manageable         ORDERS:  Orders Placed This Encounter   Procedures    External Referral To Urology       Follow-up:  Return in about 3 months (around 8/3/2022) for s. PATIENT INSTRUCTIONS:  #1 hyperlipidemia increase Zocor to 40 at bedtime repeat labs 6 months  J hypertension stable on current dose of meds no changes are necessary  3. Elevated PSA we are referring to urology  4. Anxiety stable with current dose of lorazepam no changes  5. Depression stable on current dose of Wellbutrin  6. Neuropathy stable with current dose of gabapentin  7.   Chronic pain syndrome;  Stable with tylenol #3      Patient Instructions   #1 hyperlipidemia increase Zocor to 40 at bedtime repeat labs 6 months  J hypertension stable on current dose of meds no changes are necessary  3. Elevated PSA we are referring to urology someone will call you with that appt   4. Anxiety stable with current dose of lorazepam no changes  5. Depression stable on current dose of Wellbutrin  6. Neuropathy stable with current dose of gabapentin  7. Chronic pain syndrome;  Stable with tylenol #3          Electronically signed by EMILIA Conrad on 5/3/2022 at 8:29 AM      EMRDragon/transcription disclaimer:  Much of this encounter note is electronic transcription/translation of spoken language to printed texts. The electronic translation of spoken language may be erroneous, or at times,nonsensical words or phrases may be inadvertently transcribed.   Although I have reviewed the note for such errors, some may still exist.

## 2022-05-04 LAB — MRSA SPEC QL CULT: NORMAL

## 2022-05-06 NOTE — H&P (VIEW-ONLY)
"Chief Complaint  Elevated PSA    Subjective          Megan Garcia presents to [facility] UROLOGY for elevated PSA.  This gentleman has a PSA of 52.3.  This was drawn in the context of routine screening. No significant LUTS. No familial history of prostate cancer. Patient denies any possible systemic symptoms of prostate cancer such as weight loss, lower extremity edema, or skeletal pain that could be worrisome for systemic disease.  .   Previous PSAs include  PSA 3.50 08/19/2020   PSA 1.71 01/14/2019     Previous incarcerated incisional hernia repair 8/9/2021   LAPAROSCOPIC ASSISTED ROBOTIC INCARCERATED INCISIONAL HERNIA REPAIR WITH MESH performed by Mary Wylie,  at [facility] OR     Current Outpatient Medications:   •  acetaminophen-codeine (TYLENOL #4) 300-60 MG per tablet, Take  by mouth., Disp: , Rfl:   •  amLODIPine (NORVASC) 10 MG tablet, Take 1 tablet by mouth Daily., Disp: , Rfl:   •  furosemide (LASIX) 20 MG tablet, Take  by mouth., Disp: , Rfl:   •  irbesartan (AVAPRO) 300 MG tablet, Take  by mouth., Disp: , Rfl:   •  LORazepam (ATIVAN) 1 MG tablet, Take  by mouth., Disp: , Rfl:   •  potassium chloride (K-DUR,KLOR-CON) 20 MEQ CR tablet, Take 20 mEq by mouth., Disp: , Rfl:   •  simvastatin (ZOCOR) 20 MG tablet, Take  by mouth., Disp: , Rfl:   •  ciprofloxacin (Cipro) 500 MG tablet, Take 1 tablet by mouth 2 (Two) Times a Day. Begin day before the biopsy for two days including dose on morning of procedure, Disp: 4 tablet, Rfl: 0  Past Medical History:   Diagnosis Date   • Hyperlipidemia    • Hypertension    • Neuropathic pain      Past Surgical History:   Procedure Laterality Date   • KNEE ARTHROSCOPY       Review  of systems  Constitutional: Negative for chills or fever.   Gastrointestinal: Negative for abdominal pain, anal bleeding or blood in stool.     Objective   PHYSICAL EXAM  Vital Signs:   Temp 97.6 °F (36.4 °C)   Ht 167.6 cm (66\")   Wt 97.1 kg (214 lb)   BMI 34.54 kg/m²   "   Constitutional: Well nourished, Well developed; No apparent distress.  His vital signs are reviewed  Psychiatric: Appropriate affect; Alert and oriented  Eyes: Unremarkable  Musculoskeletal: Normal gait and station  GI: Abdomen is soft, nontender  Respiratory: No distress; Unlabored movement; No accessory musculature needed with symmetric movements  Skin: No pallor or diaphoresis  : Penis and testicles are normal; Prostate 20 mL , very firm throughout with right side being larger than the left.      DATA  Result Review :                Lab Results   Component Value Date    PSA 52.30 (H) 04/27/2022    PSA 3.50 08/19/2020    PSA 1.71 01/14/2019        ASSESSMENT AND PLAN          Problem List Items Addressed This Visit    None     Visit Diagnoses     Elevated PSA    -  Primary    Relevant Medications    ciprofloxacin (Cipro) 500 MG tablet    Other Relevant Orders    Case Request (Completed)    COVID PRE-OP / PRE-PROCEDURE SCREENING ORDER (NO ISOLATION) - Swab, Nasopharynx        I discussed elevated PSA with him today. We discussed that PSA is a protein measured in the bloodstream that comes exclusively from the prostate gland.  I mentioned to him that all men with a prostate gland will have a certain PSA level. We discussed that this number can be compared to all men and age-specific PSA as well as PSA velocity. We discussed that Prostate Cancer is a possible cause of PSA elevaton, but benign etiologies such as infection, enlargement, aging, and inflammation should also be considered. We discussed that some patients with a normal PSA may also have prostate cancer. The necessity of digital rectal examination is also discussed. The role of free to total PSA Ratio is explained.  We also discussed the relatively recent recommendations of the national prevented task force.  It is explained that the PSA has been downgraded as a standard screening tool.  Essentially this downgrading recommends the study not be used or  prostate cancer screening done due to the high risk of a diagnosis of prostate cancer that is life-threatening which, if treated, can result in sexual or severe urinary side effects.  This is compared with comments by both the American Urologic Association and the American Association Clinical Urologists. Discussions among the urology community has led most of us to feel strongly that a patient has a right to know if he has prostate cancer and that all options including a conservative approach to the management of prostate cancer should be discussed between A patient and a physician familiar with the treatment options for prostate cancer.  Additional emphasis is made regarding the possibility of the diagnosis of the nonlife threatening prostate cancer which could affect the patient psychologically or even result for treatment of disease were the risks of that treatment exceeds the risk of death.  The risks (infection, bleeding, pain, retention, sepsis) and possible benefits of transrectal ultrasound with biopsy of the prostate gland is also discussed. It is explained that some patients require a repeat biopsy based on diagnosis of prostate intraepithelial neoplasia or even a continued rising PSA after an initial biopsy. He voiced no additional questions. He has elected to proceed with TRUS and biopsy of the prostate           FOLLOW UP     No follow-ups on file.        (Please note that portions of this note were completed with a voice recognition program.)  Michi Greer MD  05/10/22  08:59 CDT

## 2022-05-06 NOTE — PROGRESS NOTES
"Chief Complaint  Elevated PSA    Subjective          Megan Garcia presents to [facility] UROLOG[facility] for elevated PSA.  This gentleman has a PSA of 52.3.  This was drawn in the context of routine screening. No significant LUTS. No familial history of prostate cancer. Patient denies any possible systemic symptoms of prostate cancer such as weight loss, lower extremity edema, or skeletal pain that could be worrisome for systemic disease.  .   Previous PSAs include  PSA 3.50 08/19/2020   PSA 1.71 01/14/2019     Previous incarcerated incisional hernia repair 8/9/2021   LAPAROSCOPIC ASSISTED ROBOTIC INCARCERATED INCISIONAL HERNIA REPAIR WITH MESH performed by Mary Wylie,  at [facility] OR     Current Outpatient Medications:   •  acetaminophen-codeine (TYLENOL #4) 300-60 MG per tablet, Take  by mouth., Disp: , Rfl:   •  amLODIPine (NORVASC) 10 MG tablet, Take 1 tablet by mouth Daily., Disp: , Rfl:   •  furosemide (LASIX) 20 MG tablet, Take  by mouth., Disp: , Rfl:   •  irbesartan (AVAPRO) 300 MG tablet, Take  by mouth., Disp: , Rfl:   •  LORazepam (ATIVAN) 1 MG tablet, Take  by mouth., Disp: , Rfl:   •  potassium chloride (K-DUR,KLOR-CON) 20 MEQ CR tablet, Take 20 mEq by mouth., Disp: , Rfl:   •  simvastatin (ZOCOR) 20 MG tablet, Take  by mouth., Disp: , Rfl:   •  ciprofloxacin (Cipro) 500 MG tablet, Take 1 tablet by mouth 2 (Two) Times a Day. Begin day before the biopsy for two days including dose on morning of procedure, Disp: 4 tablet, Rfl: 0  Past Medical History:   Diagnosis Date   • Hyperlipidemia    • Hypertension    • Neuropathic pain      Past Surgical History:   Procedure Laterality Date   • KNEE ARTHROSCOPY       Review  of systems  Constitutional: Negative for chills or fever.   Gastrointestinal: Negative for abdominal pain, anal bleeding or blood in stool.     Objective   PHYSICAL EXAM  Vital Signs:   Temp 97.6 °F (36.4 °C)   Ht 167.6 cm (66\")   Wt 97.1 kg (214 lb)   BMI 34.54 kg/m²   "   Constitutional: Well nourished, Well developed; No apparent distress.  His vital signs are reviewed  Psychiatric: Appropriate affect; Alert and oriented  Eyes: Unremarkable  Musculoskeletal: Normal gait and station  GI: Abdomen is soft, nontender  Respiratory: No distress; Unlabored movement; No accessory musculature needed with symmetric movements  Skin: No pallor or diaphoresis  : Penis and testicles are normal; Prostate 20 mL , very firm throughout with right side being larger than the left.      DATA  Result Review :                Lab Results   Component Value Date    PSA 52.30 (H) 04/27/2022    PSA 3.50 08/19/2020    PSA 1.71 01/14/2019        ASSESSMENT AND PLAN          Problem List Items Addressed This Visit    None     Visit Diagnoses     Elevated PSA    -  Primary    Relevant Medications    ciprofloxacin (Cipro) 500 MG tablet    Other Relevant Orders    Case Request (Completed)    COVID PRE-OP / PRE-PROCEDURE SCREENING ORDER (NO ISOLATION) - Swab, Nasopharynx        I discussed elevated PSA with him today. We discussed that PSA is a protein measured in the bloodstream that comes exclusively from the prostate gland.  I mentioned to him that all men with a prostate gland will have a certain PSA level. We discussed that this number can be compared to all men and age-specific PSA as well as PSA velocity. We discussed that Prostate Cancer is a possible cause of PSA elevaton, but benign etiologies such as infection, enlargement, aging, and inflammation should also be considered. We discussed that some patients with a normal PSA may also have prostate cancer. The necessity of digital rectal examination is also discussed. The role of free to total PSA Ratio is explained.  We also discussed the relatively recent recommendations of the national prevented task force.  It is explained that the PSA has been downgraded as a standard screening tool.  Essentially this downgrading recommends the study not be used or  prostate cancer screening done due to the high risk of a diagnosis of prostate cancer that is life-threatening which, if treated, can result in sexual or severe urinary side effects.  This is compared with comments by both the American Urologic Association and the American Association Clinical Urologists. Discussions among the urology community has led most of us to feel strongly that a patient has a right to know if he has prostate cancer and that all options including a conservative approach to the management of prostate cancer should be discussed between A patient and a physician familiar with the treatment options for prostate cancer.  Additional emphasis is made regarding the possibility of the diagnosis of the nonlife threatening prostate cancer which could affect the patient psychologically or even result for treatment of disease were the risks of that treatment exceeds the risk of death.  The risks (infection, bleeding, pain, retention, sepsis) and possible benefits of transrectal ultrasound with biopsy of the prostate gland is also discussed. It is explained that some patients require a repeat biopsy based on diagnosis of prostate intraepithelial neoplasia or even a continued rising PSA after an initial biopsy. He voiced no additional questions. He has elected to proceed with TRUS and biopsy of the prostate           FOLLOW UP     No follow-ups on file.        (Please note that portions of this note were completed with a voice recognition program.)  Michi Greer MD  05/10/22  08:59 CDT

## 2022-05-10 ENCOUNTER — OFFICE VISIT (OUTPATIENT)
Dept: UROLOGY | Facility: CLINIC | Age: 67
End: 2022-05-10

## 2022-05-10 VITALS — WEIGHT: 214 LBS | BODY MASS INDEX: 34.39 KG/M2 | TEMPERATURE: 97.6 F | HEIGHT: 66 IN

## 2022-05-10 DIAGNOSIS — R97.20 ELEVATED PSA: Primary | ICD-10-CM

## 2022-05-10 PROCEDURE — 99204 OFFICE O/P NEW MOD 45 MIN: CPT | Performed by: UROLOGY

## 2022-05-10 RX ORDER — POTASSIUM CHLORIDE 20 MEQ/1
20 TABLET, EXTENDED RELEASE ORAL DAILY
COMMUNITY

## 2022-05-10 RX ORDER — AMLODIPINE BESYLATE 10 MG/1
1 TABLET ORAL DAILY
COMMUNITY
Start: 2022-04-18 | End: 2022-07-17

## 2022-05-10 RX ORDER — CIPROFLOXACIN 500 MG/1
500 TABLET, FILM COATED ORAL 2 TIMES DAILY
Qty: 4 TABLET | Refills: 0 | Status: SHIPPED | OUTPATIENT
Start: 2022-05-10 | End: 2022-06-15

## 2022-05-10 NOTE — TELEPHONE ENCOUNTER
Julianna Clark called to request a refill on his medication. Last office visit : 5/3/2022   Next office visit : 8/3/2022     Last UDS:   Amphetamine Screen, Urine   Date Value Ref Range Status   07/29/2021 NEG  Final     Barbiturate Screen, Urine   Date Value Ref Range Status   07/29/2021 NEG  Final     Benzodiazepine Screen, Urine   Date Value Ref Range Status   07/29/2021 NEG  Final     Buprenorphine Urine   Date Value Ref Range Status   07/29/2021 NEG  Final     Cocaine Metabolite Screen, Urine   Date Value Ref Range Status   07/29/2021 NEG  Final     Gabapentin Screen, Urine   Date Value Ref Range Status   07/29/2021 NEG  Final     MDMA, Urine   Date Value Ref Range Status   07/29/2021 NEG  Final     Methamphetamine, Urine   Date Value Ref Range Status   07/29/2021 NEG  Final     Opiate Scrn, Ur   Date Value Ref Range Status   07/29/2021 NEG  Final     Oxycodone Screen, Ur   Date Value Ref Range Status   07/29/2021 NEG  Final     PCP Screen, Urine   Date Value Ref Range Status   07/29/2021 NEG  Final     Propoxyphene Screen, Urine   Date Value Ref Range Status   07/29/2021 NEG  Final     THC Screen, Urine   Date Value Ref Range Status   07/29/2021 NEG  Final     Tricyclic Antidepressants, Urine   Date Value Ref Range Status   07/29/2021 NEG  Final       Last Nellie Likens: 4/27/22  Medication Contract: 7/29/21    Requested Prescriptions     Pending Prescriptions Disp Refills    LORazepam (ATIVAN) 1 MG tablet 180 tablet 1     Sig: Take 1 tablet by mouth 2 times daily for 90 days. Please approve or refuse this medication.    Paulina Hill MA

## 2022-05-16 ENCOUNTER — PRE-ADMISSION TESTING (OUTPATIENT)
Dept: PREADMISSION TESTING | Facility: HOSPITAL | Age: 67
End: 2022-05-16

## 2022-05-16 ENCOUNTER — LAB (OUTPATIENT)
Dept: LAB | Facility: HOSPITAL | Age: 67
End: 2022-05-16

## 2022-05-16 VITALS
SYSTOLIC BLOOD PRESSURE: 147 MMHG | BODY MASS INDEX: 34.33 KG/M2 | WEIGHT: 213.63 LBS | HEIGHT: 66 IN | RESPIRATION RATE: 16 BRPM | HEART RATE: 84 BPM | OXYGEN SATURATION: 97 % | DIASTOLIC BLOOD PRESSURE: 77 MMHG

## 2022-05-16 DIAGNOSIS — R97.20 ELEVATED PSA: ICD-10-CM

## 2022-05-16 LAB — SARS-COV-2 ORF1AB RESP QL NAA+PROBE: NOT DETECTED

## 2022-05-16 PROCEDURE — U0004 COV-19 TEST NON-CDC HGH THRU: HCPCS

## 2022-05-16 PROCEDURE — C9803 HOPD COVID-19 SPEC COLLECT: HCPCS

## 2022-05-16 PROCEDURE — U0005 INFEC AGEN DETEC AMPLI PROBE: HCPCS

## 2022-05-18 ENCOUNTER — ANESTHESIA EVENT (OUTPATIENT)
Dept: PERIOP | Facility: HOSPITAL | Age: 67
End: 2022-05-18

## 2022-05-18 ENCOUNTER — HOSPITAL ENCOUNTER (OUTPATIENT)
Facility: HOSPITAL | Age: 67
Setting detail: HOSPITAL OUTPATIENT SURGERY
Discharge: HOME | End: 2022-05-18
Attending: UROLOGY | Admitting: UROLOGY

## 2022-05-18 ENCOUNTER — ANESTHESIA (OUTPATIENT)
Dept: PERIOP | Facility: HOSPITAL | Age: 67
End: 2022-05-18

## 2022-05-18 VITALS
TEMPERATURE: 97.9 F | OXYGEN SATURATION: 95 % | DIASTOLIC BLOOD PRESSURE: 79 MMHG | RESPIRATION RATE: 16 BRPM | SYSTOLIC BLOOD PRESSURE: 137 MMHG | HEART RATE: 68 BPM

## 2022-05-18 DIAGNOSIS — R97.20 ELEVATED PSA: ICD-10-CM

## 2022-05-18 PROCEDURE — 25010000002 ONDANSETRON PER 1 MG: Performed by: NURSE ANESTHETIST, CERTIFIED REGISTERED

## 2022-05-18 PROCEDURE — 25010000002 PROPOFOL 1000 MG/100ML EMULSION: Performed by: NURSE ANESTHETIST, CERTIFIED REGISTERED

## 2022-05-18 PROCEDURE — 0 LIDOCAINE 1 % SOLUTION: Performed by: UROLOGY

## 2022-05-18 PROCEDURE — 55700 PR PROSTATE NEEDLE BIOPSY ANY APPROACH: CPT | Performed by: UROLOGY

## 2022-05-18 PROCEDURE — G0416 PROSTATE BIOPSY, ANY MTHD: HCPCS | Performed by: UROLOGY

## 2022-05-18 PROCEDURE — 76942 ECHO GUIDE FOR BIOPSY: CPT | Performed by: UROLOGY

## 2022-05-18 PROCEDURE — 25010000002 FENTANYL CITRATE (PF) 100 MCG/2ML SOLUTION: Performed by: NURSE ANESTHETIST, CERTIFIED REGISTERED

## 2022-05-18 PROCEDURE — 88305 TISSUE EXAM BY PATHOLOGIST: CPT | Performed by: UROLOGY

## 2022-05-18 RX ORDER — ULTRASOUND COUPLING MEDIUM
GEL (GRAM) TOPICAL AS NEEDED
Status: DISCONTINUED | OUTPATIENT
Start: 2022-05-18 | End: 2022-05-18 | Stop reason: HOSPADM

## 2022-05-18 RX ORDER — SODIUM CHLORIDE 0.9 % (FLUSH) 0.9 %
3-10 SYRINGE (ML) INJECTION AS NEEDED
Status: DISCONTINUED | OUTPATIENT
Start: 2022-05-18 | End: 2022-05-18 | Stop reason: HOSPADM

## 2022-05-18 RX ORDER — IBUPROFEN 600 MG/1
600 TABLET ORAL ONCE AS NEEDED
Status: DISCONTINUED | OUTPATIENT
Start: 2022-05-18 | End: 2022-05-18 | Stop reason: HOSPADM

## 2022-05-18 RX ORDER — MIDAZOLAM HYDROCHLORIDE 1 MG/ML
0.5 INJECTION INTRAMUSCULAR; INTRAVENOUS
Status: DISCONTINUED | OUTPATIENT
Start: 2022-05-18 | End: 2022-05-18 | Stop reason: HOSPADM

## 2022-05-18 RX ORDER — PROPOFOL 10 MG/ML
INJECTION, EMULSION INTRAVENOUS CONTINUOUS PRN
Status: DISCONTINUED | OUTPATIENT
Start: 2022-05-18 | End: 2022-05-18 | Stop reason: SURG

## 2022-05-18 RX ORDER — FENTANYL CITRATE 50 UG/ML
INJECTION, SOLUTION INTRAMUSCULAR; INTRAVENOUS AS NEEDED
Status: DISCONTINUED | OUTPATIENT
Start: 2022-05-18 | End: 2022-05-18 | Stop reason: SURG

## 2022-05-18 RX ORDER — ACETAMINOPHEN 500 MG
1000 TABLET ORAL ONCE
Status: COMPLETED | OUTPATIENT
Start: 2022-05-18 | End: 2022-05-18

## 2022-05-18 RX ORDER — FLUMAZENIL 0.1 MG/ML
0.2 INJECTION INTRAVENOUS AS NEEDED
Status: DISCONTINUED | OUTPATIENT
Start: 2022-05-18 | End: 2022-05-18 | Stop reason: HOSPADM

## 2022-05-18 RX ORDER — DROPERIDOL 2.5 MG/ML
0.62 INJECTION, SOLUTION INTRAMUSCULAR; INTRAVENOUS ONCE AS NEEDED
Status: DISCONTINUED | OUTPATIENT
Start: 2022-05-18 | End: 2022-05-18 | Stop reason: HOSPADM

## 2022-05-18 RX ORDER — MIDAZOLAM HYDROCHLORIDE 1 MG/ML
1 INJECTION INTRAMUSCULAR; INTRAVENOUS
Status: DISCONTINUED | OUTPATIENT
Start: 2022-05-18 | End: 2022-05-18 | Stop reason: HOSPADM

## 2022-05-18 RX ORDER — SODIUM CHLORIDE, SODIUM LACTATE, POTASSIUM CHLORIDE, CALCIUM CHLORIDE 600; 310; 30; 20 MG/100ML; MG/100ML; MG/100ML; MG/100ML
100 INJECTION, SOLUTION INTRAVENOUS CONTINUOUS
Status: DISCONTINUED | OUTPATIENT
Start: 2022-05-18 | End: 2022-05-18 | Stop reason: HOSPADM

## 2022-05-18 RX ORDER — HYDROCODONE BITARTRATE AND ACETAMINOPHEN 5; 325 MG/1; MG/1
1 TABLET ORAL EVERY 8 HOURS PRN
Qty: 6 TABLET | Refills: 0 | Status: SHIPPED | OUTPATIENT
Start: 2022-05-18

## 2022-05-18 RX ORDER — LIDOCAINE HYDROCHLORIDE 10 MG/ML
0.5 INJECTION, SOLUTION EPIDURAL; INFILTRATION; INTRACAUDAL; PERINEURAL ONCE AS NEEDED
Status: DISCONTINUED | OUTPATIENT
Start: 2022-05-18 | End: 2022-05-18 | Stop reason: HOSPADM

## 2022-05-18 RX ORDER — ONDANSETRON 2 MG/ML
4 INJECTION INTRAMUSCULAR; INTRAVENOUS ONCE AS NEEDED
Status: DISCONTINUED | OUTPATIENT
Start: 2022-05-18 | End: 2022-05-18 | Stop reason: HOSPADM

## 2022-05-18 RX ORDER — FENTANYL CITRATE 50 UG/ML
25 INJECTION, SOLUTION INTRAMUSCULAR; INTRAVENOUS
Status: DISCONTINUED | OUTPATIENT
Start: 2022-05-18 | End: 2022-05-18 | Stop reason: HOSPADM

## 2022-05-18 RX ORDER — HYDROCODONE BITARTRATE AND ACETAMINOPHEN 5; 325 MG/1; MG/1
1 TABLET ORAL ONCE AS NEEDED
Status: DISCONTINUED | OUTPATIENT
Start: 2022-05-18 | End: 2022-05-18 | Stop reason: HOSPADM

## 2022-05-18 RX ORDER — ONDANSETRON 2 MG/ML
INJECTION INTRAMUSCULAR; INTRAVENOUS AS NEEDED
Status: DISCONTINUED | OUTPATIENT
Start: 2022-05-18 | End: 2022-05-18 | Stop reason: SURG

## 2022-05-18 RX ORDER — OXYCODONE AND ACETAMINOPHEN 10; 325 MG/1; MG/1
1 TABLET ORAL ONCE AS NEEDED
Status: DISCONTINUED | OUTPATIENT
Start: 2022-05-18 | End: 2022-05-18 | Stop reason: HOSPADM

## 2022-05-18 RX ORDER — LIDOCAINE HYDROCHLORIDE 10 MG/ML
INJECTION, SOLUTION INFILTRATION; PERINEURAL AS NEEDED
Status: DISCONTINUED | OUTPATIENT
Start: 2022-05-18 | End: 2022-05-18 | Stop reason: HOSPADM

## 2022-05-18 RX ORDER — SODIUM CHLORIDE, SODIUM LACTATE, POTASSIUM CHLORIDE, CALCIUM CHLORIDE 600; 310; 30; 20 MG/100ML; MG/100ML; MG/100ML; MG/100ML
1000 INJECTION, SOLUTION INTRAVENOUS CONTINUOUS
Status: DISCONTINUED | OUTPATIENT
Start: 2022-05-18 | End: 2022-05-18 | Stop reason: HOSPADM

## 2022-05-18 RX ORDER — OXYCODONE AND ACETAMINOPHEN 7.5; 325 MG/1; MG/1
2 TABLET ORAL EVERY 4 HOURS PRN
Status: DISCONTINUED | OUTPATIENT
Start: 2022-05-18 | End: 2022-05-18 | Stop reason: HOSPADM

## 2022-05-18 RX ORDER — SODIUM CHLORIDE 0.9 % (FLUSH) 0.9 %
3 SYRINGE (ML) INJECTION EVERY 12 HOURS SCHEDULED
Status: DISCONTINUED | OUTPATIENT
Start: 2022-05-18 | End: 2022-05-18 | Stop reason: HOSPADM

## 2022-05-18 RX ORDER — SODIUM CHLORIDE 0.9 % (FLUSH) 0.9 %
3 SYRINGE (ML) INJECTION AS NEEDED
Status: DISCONTINUED | OUTPATIENT
Start: 2022-05-18 | End: 2022-05-18 | Stop reason: HOSPADM

## 2022-05-18 RX ORDER — NALOXONE HCL 0.4 MG/ML
0.4 VIAL (ML) INJECTION AS NEEDED
Status: DISCONTINUED | OUTPATIENT
Start: 2022-05-18 | End: 2022-05-18 | Stop reason: HOSPADM

## 2022-05-18 RX ORDER — LABETALOL HYDROCHLORIDE 5 MG/ML
5 INJECTION, SOLUTION INTRAVENOUS
Status: DISCONTINUED | OUTPATIENT
Start: 2022-05-18 | End: 2022-05-18 | Stop reason: HOSPADM

## 2022-05-18 RX ORDER — LIDOCAINE HYDROCHLORIDE 20 MG/ML
INJECTION, SOLUTION EPIDURAL; INFILTRATION; INTRACAUDAL; PERINEURAL AS NEEDED
Status: DISCONTINUED | OUTPATIENT
Start: 2022-05-18 | End: 2022-05-18 | Stop reason: SURG

## 2022-05-18 RX ORDER — LIDOCAINE HYDROCHLORIDE 10 MG/ML
0.5 INJECTION, SOLUTION EPIDURAL; INFILTRATION; INTRACAUDAL; PERINEURAL ONCE AS NEEDED
Status: DISCONTINUED | OUTPATIENT
Start: 2022-05-18 | End: 2022-05-18

## 2022-05-18 RX ADMIN — FENTANYL CITRATE 50 MCG: 50 INJECTION, SOLUTION INTRAMUSCULAR; INTRAVENOUS at 11:55

## 2022-05-18 RX ADMIN — ONDANSETRON 4 MG: 2 INJECTION INTRAMUSCULAR; INTRAVENOUS at 11:58

## 2022-05-18 RX ADMIN — LIDOCAINE HYDROCHLORIDE 5 ML: 20 INJECTION, SOLUTION EPIDURAL; INFILTRATION; INTRACAUDAL at 11:56

## 2022-05-18 RX ADMIN — FENTANYL CITRATE 50 MCG: 50 INJECTION, SOLUTION INTRAMUSCULAR; INTRAVENOUS at 12:10

## 2022-05-18 RX ADMIN — ACETAMINOPHEN 1000 MG: 500 TABLET ORAL at 10:40

## 2022-05-18 RX ADMIN — SODIUM CHLORIDE, POTASSIUM CHLORIDE, SODIUM LACTATE AND CALCIUM CHLORIDE 1000 ML: 600; 310; 30; 20 INJECTION, SOLUTION INTRAVENOUS at 06:59

## 2022-05-18 RX ADMIN — PROPOFOL 80 MCG/KG/MIN: 10 INJECTION, EMULSION INTRAVENOUS at 11:56

## 2022-05-18 NOTE — ANESTHESIA POSTPROCEDURE EVALUATION
Patient: Megan Garcia    Procedure Summary     Date: 05/18/22 Room / Location:  [facility] OR  / [facility] OR    Anesthesia Start: 1152 Anesthesia Stop: 1216    Procedure: PROSTATE ULTRASOUND BIOPSY. NOT A URONAV (N/A ) Diagnosis:       Elevated PSA      (Elevated PSA [R97.20])    Surgeons: Michi Greer MD Provider: Theresa Ferris CRNA    Anesthesia Type: general ASA Status: 2          Anesthesia Type: general    Vitals  Vitals Value Taken Time   /90 05/18/22 1235   Temp 97.9 °F (36.6 °C) 05/18/22 1230   Pulse 68 05/18/22 1235   Resp 15 05/18/22 1235   SpO2 95 % 05/18/22 1235           Post Anesthesia Care and Evaluation    Patient location during evaluation: PACU  Patient participation: complete - patient participated  Level of consciousness: awake  Pain management: adequate  Airway patency: patent  Anesthetic complications: No anesthetic complications  PONV Status: none  Cardiovascular status: acceptable  Respiratory status: acceptable  Hydration status: acceptable

## 2022-05-18 NOTE — OP NOTE
Operative Summary    Megan Garcia  Date of Procedure: 5/18/2022    Pre-op Diagnosis:   Elevated PSA [R97.20]    Post-op Diagnosis:     Post-Op Diagnosis Codes:     * Elevated PSA [R97.20]    Procedure/CPT® Codes:      Procedure(s):  TRANSRECTAL ULTRASOUND OF THE PROSTATE GLAND  ULTRASOUND GUIDED  DEEP NEEDLE BIOPSY OF THE PROSTATE GLAND          Surgeon(s):  Michi Greer MD    Anesthesia: General    Staff:   Circulator: Bickerstaff, Rachael R, RN; Saloni Gillette RN; Renata Dodd RN  Scrub Person: Tomi Hand; Ariane Gore    Indications for procedure:  Markedly elevated PSA and abnormal digital rectal exam    Procedure details:  After appropriate anesthesia, positioning, prep and drape, timeout protocol was observed. The patient is placed in the left lateral decubitus position with the hips and knees flexed.    The well-lubricated ultrasound probe was gently inserted per rectum. Prostate was scanned from the base of the bladder to the apex.  Then 20 ml of 1% lidocaine plain was used to perform a prostate nerve block injecting the junction of the seminal vesicle and bladder laterally.  Transverse and sagittal images of the prostate are taken.    This is a very abnormal appearing prostate gland.  I really was unable to take dimensions due to the irregularity in shape.  The right seminal vesicle is very irregular particularly at the base of it where the ejaculatory duct is located.  There also appears to be a right apical nodule that measured 2 cm.  Zonal anatomy is completely obliterated.  There does appear to be some periurethral calcification.  This is almost certainly extracapsular at the right apex and right base.    A total of 14 biopsies were taken from both seminal vesicles and the base, apex, and mid portion of the gland on both the right and the left sides.         Estimated Blood Loss: <30 mL    Specimens:                Specimens     ID Source Type Tests Collected By Collected At  [place]?    A Prostate Tissue · TISSUE PATHOLOGY EXAM   Michi Greer MD 5/18/22 0811     Description: RIGHT MID LATERAL    B Prostate Tissue · TISSUE PATHOLOGY EXAM   Michi Greer MD 5/18/22 0811     Description: LEFT APEX    C Prostate Tissue · TISSUE PATHOLOGY EXAM   Michi Greer MD 5/18/22 0811     Description: LEFT LATERAL APEX    D Prostate Tissue · TISSUE PATHOLOGY EXAM   Michi Greer MD 5/18/22 0811     Description: LEFT BASE    E Prostate Tissue · TISSUE PATHOLOGY EXAM   Michi Greer MD 5/18/22 0811     Description: LEFT LATERAL BASE    F Prostate Tissue · TISSUE PATHOLOGY EXAM   Michi Greer MD 5/18/22 0811     Description: LEFT MID    G Prostate Tissue · TISSUE PATHOLOGY EXAM   Michi Greer MD 5/18/22 0811     Description: LEFT MID LATERAL    H Prostate Tissue · TISSUE PATHOLOGY EXAM   Michi Greer MD 5/18/22 0811     Description: RIGHT APEX    I Prostate Tissue · TISSUE PATHOLOGY EXAM   Michi Greer MD 5/18/22 0811     Description: RIGHT LATERAL APEX    J Prostate Tissue · TISSUE PATHOLOGY EXAM   Michi Greer MD 5/18/22 0811     Description: RIGHT BASE    K Prostate Tissue · TISSUE PATHOLOGY EXAM   Michi Greer MD 5/18/22 0811     Description: RIGHT LATERAL BASE    L Prostate Tissue · TISSUE PATHOLOGY EXAM   Michi Greer MD 5/18/22 0811     Description: RIGHT MID    M Prostate Tissue · TISSUE PATHOLOGY EXAM   Michi Greer MD 5/18/22 1207     Description: right semical  vesicle    N Prostate Tissue · TISSUE PATHOLOGY EXAM   Michi Greer MD 5/18/22 1207     Description: left semical vesicle            Drains: None    Complications: none    Plan: He was instructed to call the office in one week to get the pathology results at which time we will arrange follow-up dependent upon those.  He is also to contact us if he develops fever >101 or has hematuria significant enough to obstruct urine stream.      (Please note that portions of this note  were completed with a voice recognition program.)  Michi Greer MD     Date: 5/18/2022  Time: 12:15 CDT

## 2022-05-18 NOTE — ANESTHESIA PREPROCEDURE EVALUATION
Anesthesia Evaluation     Patient summary reviewed   NPO Solid Status: > 6 hours  NPO Liquid Status: > 4 hours           Airway   Mallampati: II  Dental      Pulmonary    (+) a smoker Former,   Cardiovascular   Exercise tolerance: good (4-7 METS)    (+) hypertension, hyperlipidemia,   (-) pacemaker, valvular problems/murmurs, past MI, CABG      Neuro/Psych  (-) seizures, CVA  GI/Hepatic/Renal/Endo    (-) no renal disease, diabetes    Musculoskeletal     Abdominal    Substance History      OB/GYN          Other                        Anesthesia Plan    ASA 2     general     intravenous induction     Anesthetic plan, all risks, benefits, and alternatives have been provided, discussed and informed consent has been obtained with: patient.        CODE STATUS:

## 2022-05-19 LAB
CYTO UR: NORMAL
LAB AP CASE REPORT: NORMAL
Lab: NORMAL
PATH REPORT.FINAL DX SPEC: NORMAL
PATH REPORT.GROSS SPEC: NORMAL

## 2022-05-20 ENCOUNTER — TELEPHONE (OUTPATIENT)
Dept: UROLOGY | Facility: CLINIC | Age: 67
End: 2022-05-20

## 2022-05-20 DIAGNOSIS — C61 PROSTATE CANCER: Primary | ICD-10-CM

## 2022-05-20 NOTE — TELEPHONE ENCOUNTER
Called pt and scheduled him for a cancer consult on 06/02 with dr wynne. I also told the patient that the imaging center would be in contact with him to get scheduled for a ct and mri that the orders were already in and it was documents when dr wynne wanted him to have those done.

## 2022-05-20 NOTE — TELEPHONE ENCOUNTER
Patient contacted with results.  We need to set him up for a CT of the abdomen pelvis and bone scan.  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  Based on the ultrasound and his exam he has extracapsular disease.  Electronically signed by Micih Greer MD, 05/20/22, 8:59 AM CDT.

## 2022-05-31 ENCOUNTER — HOSPITAL ENCOUNTER (OUTPATIENT)
Dept: CT IMAGING | Facility: HOSPITAL | Age: 67
Discharge: HOME | End: 2022-05-31

## 2022-05-31 ENCOUNTER — HOSPITAL ENCOUNTER (OUTPATIENT)
Dept: NUCLEAR MEDICINE | Facility: HOSPITAL | Age: 67
End: 2022-05-31

## 2022-05-31 ENCOUNTER — HOSPITAL ENCOUNTER (OUTPATIENT)
Dept: NUCLEAR MEDICINE | Facility: HOSPITAL | Age: 67
Discharge: HOME | End: 2022-05-31

## 2022-05-31 ENCOUNTER — APPOINTMENT (OUTPATIENT)
Dept: NUCLEAR MEDICINE | Facility: HOSPITAL | Age: 67
End: 2022-05-31

## 2022-05-31 DIAGNOSIS — C61 PROSTATE CANCER: ICD-10-CM

## 2022-05-31 PROCEDURE — A9561 TC99M OXIDRONATE: HCPCS | Performed by: UROLOGY

## 2022-05-31 PROCEDURE — 74177 CT ABD & PELVIS W/CONTRAST: CPT

## 2022-05-31 PROCEDURE — 0 TECHNETIUM OXIDRONATE KIT: Performed by: UROLOGY

## 2022-05-31 PROCEDURE — 78306 BONE IMAGING WHOLE BODY: CPT

## 2022-05-31 PROCEDURE — 25010000002 IOPAMIDOL 61 % SOLUTION: Performed by: UROLOGY

## 2022-05-31 RX ADMIN — IOPAMIDOL 100 ML: 612 INJECTION, SOLUTION INTRAVENOUS at 13:15

## 2022-05-31 RX ADMIN — TECHNETIUM TC 99M OXIDRONATE 1 DOSE: 3.15 INJECTION, POWDER, LYOPHILIZED, FOR SOLUTION INTRAVENOUS at 11:14

## 2022-06-01 ENCOUNTER — APPOINTMENT (OUTPATIENT)
Dept: CT IMAGING | Facility: HOSPITAL | Age: 67
End: 2022-06-01

## 2022-06-02 ENCOUNTER — OFFICE VISIT (OUTPATIENT)
Dept: UROLOGY | Facility: CLINIC | Age: 67
End: 2022-06-02

## 2022-06-02 VITALS — BODY MASS INDEX: 34.55 KG/M2 | TEMPERATURE: 98.1 F | WEIGHT: 215 LBS | HEIGHT: 66 IN

## 2022-06-02 DIAGNOSIS — C61 PROSTATE CANCER: Primary | ICD-10-CM

## 2022-06-02 PROCEDURE — 99215 OFFICE O/P EST HI 40 MIN: CPT | Performed by: UROLOGY

## 2022-06-02 PROCEDURE — G2212 PROLONG OUTPT/OFFICE VIS: HCPCS | Performed by: UROLOGY

## 2022-06-03 NOTE — PROGRESS NOTES
Prostate Cancer consult  Mr. Garcia is 66 y.o. male    Chief complaint: I am here about my prostate biopsy.    He is here today to discuss his newly diagnosed prostate cancer.    Date of Biopsy:   Results:   Esteban grade (highest)  4 + 4 = 8  Core involvement:  12/12 prostate; right seminal vesical has 4+4=8 as wellt    Greatest > Core involvement: 95% (all cores >60%)  Clinical stage: cT3b, N0, M0  Pre-Biopsy PSA: 52.3            Current Outpatient Medications:   •  acetaminophen-codeine (TYLENOL #4) 300-60 MG per tablet, Take  by mouth., Disp: , Rfl:   •  amLODIPine (NORVASC) 10 MG tablet, Take 1 tablet by mouth Daily., Disp: , Rfl:   •  ciprofloxacin (Cipro) 500 MG tablet, Take 1 tablet by mouth 2 (Two) Times a Day. Begin day before the biopsy for two days including dose on morning of procedure, Disp: 4 tablet, Rfl: 0  •  furosemide (LASIX) 20 MG tablet, Take  by mouth., Disp: , Rfl:   •  HYDROcodone-acetaminophen (NORCO) 5-325 MG per tablet, Take 1 tablet by mouth Every 8 (Eight) Hours As Needed for Moderate Pain  (Pain)., Disp: 6 tablet, Rfl: 0  •  irbesartan (AVAPRO) 300 MG tablet, Take  by mouth., Disp: , Rfl:   •  LORazepam (ATIVAN) 1 MG tablet, Take  by mouth., Disp: , Rfl:   •  potassium chloride (K-DUR,KLOR-CON) 20 MEQ CR tablet, Take 20 mEq by mouth., Disp: , Rfl:   •  simvastatin (ZOCOR) 20 MG tablet, Take  by mouth., Disp: , Rfl:     Past Medical History:   Diagnosis Date   • Hyperlipidemia    • Hypertension    • Neuropathic pain    • UTI (urinary tract infection)        Past Surgical History:   Procedure Laterality Date   • KNEE ACL RECONSTRUCTION     • KNEE ARTHROSCOPY     • PROSTATE ULTRASOUND BIOPSY N/A 5/18/2022    Procedure: PROSTATE ULTRASOUND BIOPSY. NOT A URONAV;  Surgeon: Michi Greer MD;  Location: [facility];  Service: Urology;  Laterality: N/A;       * Cannot find OR log *    Discussion:    After finding out the patient has done well from the biopsy, we went over the pathology report  including the assignment and application of the [place] score, the volume of prostate cancer as predicted by the number of cores and percent involvement in each, We went over how this is used to determine whether or not the patient needs a bone scan and or CT scan of the abdomen and pelvis in addition to other preoperative workup. We talked about the use of a Laci nomogram to predict whether or not this prostate cancer is likely to still be confined to the gland.         Risk Stratification: Very High Risk -> PSA>20, All cores +,      -  Imaging studies to assess the cancer  CT Abdomen Pelvis With Contrast (05/31/2022 13:15)   NM Bone Scan Whole Body (05/31/2022 13:55)   Reviewed report & images /DLS    We discussed the following:         Healthy Living Tips   If a man chooses active surveillance, what dietary changes should he make?   Most men ask what they should be doing in terms of lifestyle changes to help prevent progression of their disease. This is an area of intense interest now but it's one that is clouded by the fact that dietary supplements are a billion dollar industry in the [place] This often makes it hard for consumers to distinguish between science and marketing.     The Prostate Cancer Foundation (PCF.org <http://www.pcf.org/>) has a free guide that can be ordered from their website titled Nutrition, Exercise, and Prostate Cancer. I would advise all men with prostate cancer to read this. Also, the American Cancer Society <http://www.cancer.org/Healthy/EatHealthyGetActive/ACSGuidelinesonNutritionPhysicalActivityforCancerPrevention/onwu-nkhcottiuv-ymz>has up to date information on lifestyle choices and cancer prevention.   The bottom line is that a diet that is low in animal and dairy fat, high in a wide variety of fruits and vegetables and healthy grains and nuts, is thought be a diet that can slow the progression of cancer. Maintaining a healthy weight with daily exercise is also considered  important.     Recent studies have shown that a man's lifestyle-especially nutrition and exercise-has a significant influence in prostate cancer prevention and treatment.   · Follow these tips to help you live a healthier life   · Lose body fat by eating fewer calories per day than you burn   · Maintain muscle mass by increasing protein intake and exercise   · Cut carbohydrate intake to cut down on excess fat and weight, which can slow tumor growth   · Exercise every day, combining cardio fitness and weight lifting   · Eat nine servings a day of colorful fruits and vegetables   · Reduce stress by focusing on living a balanced life and taking care of yourself   · Plan ahead to eat healthfully and minimize stress   · Track your behaviors and help chart your progress   · Establish a support system by maintaining healthy relationships with people who understand what you are going through       Robot Assisted Laparoscopic Prostatectomy  We went over the rationale of the use of robot assisted laparoscopic prostatectomy. . This procedure offers excellent local control with comparable intermediate cure rates. I also explained the technique of open retropubic prostatectomy. We discussed how previous abdominal surgery makes the operation more challenging in addition to body habitus and previous other anatomic variations that sometimes cause the procedure to last longer than expected or require conversion to the open procedure. A shorter hospital stay, more rapid progression during convalescence, and considerable decrease in blood loss due to pneumoperitoneum are explained. Complications of pneumoperitoneum and trochar placement are explained as a unique problem with robot prostatectomy. Preservation of continence and erections are discussed and compared with open surgery, radiation and other modalities of treatment. The role of early penile rehabilitation for more rapid resumption of erectile treatment is also discussed.  Bladder neck contracture, Vesicourethral anastomotic leak, ureteral obstruction, and rectal injury are also discussed.     Radiation therapy options  We also talked about the types of radiation therapy are available. I explained to him the difference in traditional external beam radiotherapy versus three-dimensional conformal IMR T. I explained that the latter seems to reduce the risks of damage to surrounding tissue with no decrease in the benefit of overall cancer treatment in studies. I did explain that typically this requires the placement of prostate fiduciary markers which will require an additional transrectal ultrasound with transperineal puncture to place these markers.He was explained that this is to accommodate the radiation oncologist in locating the prostate gland at each therapy session.  It is explained that in the same setting we typically use a gel between the rectum and prostate to limit the side effect upon the rectum.    I did explain that 1 disadvantages of this technique as the time consumption 8-9 consecutive weeks of treatment. We also discussed the used to prostate interstitial seed implantation. I explained to him the role of a volume study. It is also explained that radioactive seeds are implanted which increases the overall dose of radiation to the prostate while limiting the exposure to the surrounding tissues. I did also explain that our experience with this type of treatment in the limited number of these therapies that we did lead us to make a decision to no longer offer this to patient's locally. I did explain that I have a referral basis would be delighted to send this patient. We also briefly discussed proton therapy since that is not something available in our area. I explained the basics that normal tissue and a fixed distance from the target receives less scatter radiation from proton therapy than x-rays. This is this is the theoretical advantage to use of proton therapy versus  IMRT.     Systemic Therapy  The use of androgen ablation including its mechanism, options of delivery including medications versus surgical castration, as well as risks and benefits are discussed.  I discussed that hormonal therapy, while not curative, can help stop the growth and/or spread of cancer in the prostate. I explained that it does appear to increase survival in some situations.  I explained that typically the response to hormonal therapy is about 2 years after which other options will be considered.  I explained the meaning of  castrate resistant prostate cancer.  I also explained it can shrink and enlarged prostate which may be beneficial for urinary drainage of both the upper and lower tract.  I also explained that hormonal therapy has the risks of erectile dysfunction, osteoporosis (use of calcium with vitamin D is discussed), loss of libido, decrease in muscle mass (weightbearing exercises recommended), hot flashes, breast enlargement, decreased mental acuity, depression, weight gain and metabolic syndrome (the DASH diet is recommended as well as routine follow-up with primary care physician).      He has elected to undergo EBRT as definitive management. I recommended that he undergo at least 24 months of androgen deprivation therapy. It appears in the high risk group that patients getting 24-36 months of ADT have improved survival compared to shorter duration.     This discussion is based on the following information on the AUA website:   Two randomized trials have compared EBRT with short-term versus long-term ADT. EORTC [id number] randomized 1,113 men with high-risk prostate cancer to EBRT plus 6 versus 36 months of ADT. Five-year overall mortality was 19% for short-term ADT and 15% for long-term ADT. [id number] [id number] randomized patients to EBRT plus 4 versus 28 months of ADT. In the subgroup of patients with Esteban 8-10 disease, 5-year overall survival was 71% for short-term ADT and 81% for long-term ADT.  Based on these trials, acceptable ADT durations for radiotherapy patients with high-risk prostate cancer range from 24-36 months. A randomized trial that compared radiotherapy plus 18 versus 36 months ADT in high-risk patients has not been published with mature data; at this time it is unknown if 18 months of ADT is an acceptable duration.  Radiation treatment options for high-risk prostate cancer include IMRT, and IMRT plus brachytherapy (low- or high-dose rate). There are little data of long-term efficacy of SBRT in high-risk prostate cancer, and this modality is not recommended.  In high-risk patients without evidence of sally metastasis based on imaging, radiation treatment may electively include pelvic sally areas because published nomograms demonstrate that these patients have a risk of harboring micrometastatic sally disease. Whether pelvic radiotherapy improves survival is the subject of a current randomized trial. Prior randomized trials comparing prostate-only versus prostate and pelvic radiation treatment have not demonstrated improved survival from electively adding pelvic radiation.        Cryotherapy  Cryotherapy is explained as freezing of the prostate gland which is believed to kill prostate cancer cells. Clinical studies have shown a benefit for patients with organ confined prostate cancer. I told the patient that we don't offer this therapy, but also I really have never had a patient that has undergone cryotherapy, but I did offer to find a regional urologist for them to see that does it. I explained that it can also be used to treat patients that have failed initial radiation therapy.    Based on this discussion, the patient has opted for EBRT + 3 years of ADT. Will refer to Dr. Trotter.   Patient will also be seeing Dr. Keating. 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 He would like the opinion of an Oncologist.  I admit I am unaware of any trials  that would involve neoadjuvant chemotherapy or second line hormonal therapy but this has a high likelihood of being needed at some point.  This will go ahead and plug him in with Dr. Keating.  I think he would feel better to be sure our plans would align.    I spent 60 minutes caring for Megan on this date of service. This time includes time spent by me in the following activities:preparing for the visit, reviewing tests, counseling and educating the patient/family/caregiver, ordering medications, tests, or procedures and referring and communicating with other health care professionals         Assessment and Plan  Diagnoses and all orders for this visit:    1. Prostate cancer (HCC) (Primary)  -     Ambulatory Referral to Radiation Oncology-[place]               (Please note that portions of this note were completed with a voice recognition program.)Michi Greer MD  06/03/22  07:31 CDT

## 2022-06-06 ENCOUNTER — TELEPHONE (OUTPATIENT)
Dept: RADIATION ONCOLOGY | Facility: HOSPITAL | Age: 67
End: 2022-06-06

## 2022-06-13 PROBLEM — Z87.891 FORMER SMOKER: Status: ACTIVE | Noted: 2022-06-13

## 2022-06-14 NOTE — PROGRESS NOTES
[facility]Chidi Trotter MD      Cesar Hunter, APRN  ___________________________________________________________  [facility]  Department of Radiation Oncology  [address] [place] [id number]  Office:  [phone]  Fax: [phone]                DATE:  06/15/2022  PATIENT: Megan Garcia  1955                         MEDICAL RECORD #:  [id number]          Chief Complaint   Patient presents with   • Prostate Cancer                                                 Megan Garcia is a 67 y.o. male that has been referred to our office to discuss radiotherapy considerations for Stage IIIB (T3b, N0, cM0) Prostatic Adenocarcinoma, pre-PSA 52.3, [place] (4+4) 8, 12/12 cores positive. Reports fatigue, dysuria, frequency/urgency with urination, and hematuria. Denies appetite change, unexpected weight change, nausea/vomiting, diarrhea, light-headedness, weakness, and headaches. He follows .     History of Present Illness:  01/14/2019 - PSA: 1.71    08/19/2020 - PSA: 3.50    04/27/2022 - PSA: 52.30    05/10/2022 - Appointment with :  • He has elected to proceed with TRUS and biopsy of the prostate     05/18/2022 - Prostate biopsy per :  • Prostate, right mid lateral, needle biopsy:   o Adenocarcinoma, acinar type, [place] grade 4+4 = 8, involving approximately 80% of the fragmented biopsied tissue (Grade Group 4).  o Perineural invasion present.  • Prostate, left apex, needle biopsy:  o Adenocarcinoma, acinar type, [place] grade 4+3 = 7, discontinuously involving approximately 90% of the total length of the biopsied tissue (Grade Group 3).  o Perineural invasion present.  • Prostate, left lateral apex, needle biopsy:  o Adenocarcinoma, acinar type, [place]eban grade 4+4 = 8, involving approximately 80% of the fragmented biopsied tissue (Grade Group 4).  o Perineural invasion present.  • Prostate, left base, needle biopsy:   o Adenocarcinoma,  acinar type, [place] grade 4+4 = 8, discontinuously involving greater than 95% of the length of the biopsied tissue (Grade Group 4).  • Prostate, left lateral base, needle biopsy:  o Adenocarcinoma, acinar type, Esteban grade 4+4 = 8, discontinuously involving greater than 95% of the total length of the biopsied tissue (Grade Group 4).  o Perineural invasion present.  • Prostate, left mid, needle biopsy:  o Adenocarcinoma, acinar type, [place] grade 4+4 = 8, discontinuously involving greater than 95% of the total length of the fragmented biopsy tissue (Grade Group 4).  o Perineural invasion present.  • Prostate, left mid lateral, needle biopsy:   o Adenocarcinoma, acinar type, Esteban grade 4+4 = 8, involving approximately 60% of the fragmented biopsied tissue (Grade Group 4).  • Prostate, right apex, needle biopsy:   o Adenocarcinoma, acinar type, [place] grade 4+4 = 8, involving approximately 60% of the fragmented biopsied tissue (Grade Group 4).  • Prostate, right lateral apex, needle biopsy:  o Adenocarcinoma, acinar type, Esteban grade 4+4 = 8, discontinuously involving approximately 80% of the length of the biopsied tissue (Grade Group 4).  o Perineural invasion present.  • Prostate, right base, needle biopsy:  o Adenocarcinoma, acinar type, [place]ons grade 4+4 = 8, discontinuously involving greater than 95% of the total length of the biopsied tissue (Grade Group 4).  o Perineural invasion present.  • Prostate, right lateral base, needle biopsy:  o Adenocarcinoma, acinar type, Lyons grade 4+4 = 8, discontinuously involving greater than 95% of the length of the biopsied tissue (Grade Group 4).  o Perineural invasion present.  • Prostate, right mid, needle biopsy:   o Adenocarcinoma, acinar type, Lyons grade 4+4 = 8, discontinuously involving greater than 95% of the length of the biopsied tissue (Grade Group 4).  • Right seminal vesicle, needle biopsy:   o Adenocarcinoma, acinar type, Esteban grade 4+4 = 8,  discontinuously involving approximately 80% of the length of the biopsied tissue (Grade Group 4).  • Left seminal vesicle, needle biopsy: Benign seminal vesicle/ejaculatory duct tissue.  • AJCC stage: T1c pN not assigned (no lymph nodes submitted or found)    05/31/2022 - CT Abdomen/Pelvis with contrast:  • No acute abnormality of the abdomen or pelvis, particularly, no evidence of bony metastatic disease.  • An ill-defined focal soft heterogeneous enhancement along the posterior inferior aspect of the prostate which is incompletely visualized and evaluated. This may represent the area of abnormality suggested in the history    05/31/2022 - Bone Scan:  • There is no scintigraphic evidence of osteoblastic disease. Marked degenerative changes present in the  medial joint compartment of the left knee.    06/02/2022 - Appointment with :  • He has elected to undergo EBRT as definitive management. I recommended that he undergo at least 24 months of androgen deprivation therapy. It appears in the high risk group that patients getting 24-36 months of ADT have improved survival compared to shorter duration.   • Based on this discussion, the patient has opted for EBRT + 3 years of ADT. Will refer to Dr. Trotter.   • Patient will also be seeing Dr. Keating. 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 He would like the opinion of an Oncologist. I admit I am unaware of any trials that would involve neoadjuvant chemotherapy or second line hormonal therapy but this has a high likelihood of being needed at some point. This will go ahead and plug him in with Dr. Keating. I think he would feel better to be sure our plans would align.     History obtained from  PATIENT and CHART    PAST MEDICAL HISTORY  Past Medical History:   Diagnosis Date   • Hyperlipidemia    • Hypertension    • Neuropathic pain    • Prostate cancer (HCC)    • UTI (urinary tract infection)       PAST SURGICAL  "HISTORY  Past Surgical History:   Procedure Laterality Date   • KNEE ACL RECONSTRUCTION     • KNEE ARTHROSCOPY     • PROSTATE ULTRASOUND BIOPSY N/A 5/18/2022    Procedure: PROSTATE ULTRASOUND BIOPSY. NOT A URONAV;  Surgeon: Michi Greer MD;  Location: [facility] OR;  Service: Urology;  Laterality: N/A;      FAMILY HISTORY  family history is not on file.     SOCIAL HISTORY  Social History     Tobacco Use   • Smoking status: Former Smoker   • Smokeless tobacco: Never Used   • Tobacco comment: quit 20 years ago   Vaping Use   • Vaping Use: Never used   Substance Use Topics   • Alcohol use: Yes     Comment: occ   • Drug use: No     ALLERGIES  Nitroglycerin     MEDICATIONS    Current Outpatient Medications:   •  acetaminophen-codeine (TYLENOL #4) 300-60 MG per tablet, Take  by mouth., Disp: , Rfl:   •  amLODIPine (NORVASC) 10 MG tablet, Take 1 tablet by mouth Daily., Disp: , Rfl:   •  furosemide (LASIX) 20 MG tablet, Take  by mouth., Disp: , Rfl:   •  HYDROcodone-acetaminophen (NORCO) 5-325 MG per tablet, Take 1 tablet by mouth Every 8 (Eight) Hours As Needed for Moderate Pain  (Pain)., Disp: 6 tablet, Rfl: 0  •  irbesartan (AVAPRO) 300 MG tablet, Take  by mouth., Disp: , Rfl:   •  LORazepam (ATIVAN) 1 MG tablet, Take  by mouth., Disp: , Rfl:   •  potassium chloride (K-DUR,KLOR-CON) 20 MEQ CR tablet, Take 20 mEq by mouth., Disp: , Rfl:   •  simvastatin (ZOCOR) 20 MG tablet, Take  by mouth., Disp: , Rfl:     The following portions of the patient's history were reviewed and updated as appropriate: allergies, current medications, past family history, past medical history, past social history, past surgical history and problem list.    REVIEW OF SYSTEMS  Review of Systems   Constitutional: Positive for fatigue (\"my energy level has been down\"). Negative for activity change, appetite change and unexpected weight change.   HENT: Negative.    Eyes: Negative.         Glasses   Respiratory: Negative.    Cardiovascular: Negative. " "   Gastrointestinal: Negative for constipation and diarrhea.   Endocrine: Negative.    Genitourinary: Positive for dysuria, frequency (due to incomplete empyting), hematuria and urgency. Negative for decreased urine volume and difficulty urinating.   Musculoskeletal: Negative.    Skin: Negative.    Allergic/Immunologic: Negative.    Neurological: Negative.    Hematological: Negative.    Psychiatric/Behavioral: Negative.      I have reviewed and confirmed the accuracy of the ROS as documented by the MA/LPN/RN Carroll Trotter III, MD    PHYSICAL EXAM  VITAL SIGNS:   Vitals:    06/15/22 1450   BP: 159/84   Weight: 98.4 kg (217 lb)   Height: 167.6 cm (66\")   PainSc: 0-No pain      Physical Exam    General Appearance:  awake, alert, oriented, in no acute distress.  Head: Normocephalic  Eyes: Conjunctiva pink, pupils equal and reactive.   Ears:  Normal externally.    Nose/Sinuses:  Mucosa normal.   Mouth/Throat:  Mucosa moist, no lesions; pharynx without erythema, edema or exudate.   Neck: Supple, no mass, non-tender  Back:  Symmetric, no curvature, ROM normal, no CVA tenderness   Lungs:  Normal expansion.  Clear to auscultation.  No rales, rhonchi, or wheezing.  Heart:  Heart sounds are normal.  Regular rate and rhythm without murmur, gallop or rub.  Abdomen:  Soft, non-tender, normal ybowel sounds; no bruits, organomegaly or masses.  CARLOS: referred  Extremities: Warm to touch, pink, with no edema. Pulses 2+ bilaterally  Musculoskeletal: strength and sensation grossly normal  Neurologic:  Alert and oriented, gait normal, non-focal exam  Psych exam: normal situational behavior   Skin:  Warm and moist. No suspicious lesions or rashes of concern    Performance Status: ECOG (0) Fully active, able to carry on all predisease performance without restriction    Clinical Quality Measures  -Pain Documented by Standardized Tool, FPS Megan Garcia reports a pain score of 0. Given his pain assessment as noted, treatment options " were discussed and the following options were decided upon as a follow-up plan to address the patient's pain: No pain, no plan given.  Pain Medications             acetaminophen-codeine (TYLENOL #4) 300-60 MG per tablet Take  by mouth.    HYDROcodone-acetaminophen (NORCO) 5-325 MG per tablet Take 1 tablet by mouth Every 8 (Eight) Hours As Needed for Moderate Pain  (Pain).        -Advanced Care Planning Advance Care Planning  ACP discussion was held with the patient during this visit. Patient does not have an advance directive, information provided.     -Body Mass Index Screening and Follow-Up Plan Class 2 Severe Obesity (BMI >=35 and <=39.9). Obesity-related health conditions include the following: none.     -Tobacco Use: Screening and Cessation Intervention Social History    Tobacco Use      Smoking status: Former Smoker      Smokeless tobacco: Never Used      Tobacco comment: quit 20 years ago    PROSTATE PQRS #102   Bone Scan Use: Bone Scane done Pre-treatment or Post Diagnosis  Risk of Recurrence: High risk PSA > 20/GL 8-10/T3a  Measure #104  Adjuvant Hormonal [place]: Hormonal Therapy prescribed/administered  Recurrence Risk: High risk PSA > 20/GL 8-10/T3a    ASSESSMENT AND PLAN  1. Prostate cancer (HCC)    2. Former smoker      Orders Placed This Encounter   Procedures   • PSA Diagnostic     Standing Status:   Future     Number of Occurrences:   1     Standing Expiration Date:   6/15/2023     Order Specific Question:   Release to patient     Answer:   Immediate   • Ambulatory Referral to Hematology / Oncology     Referral Priority:   Routine     Referred to Provider:   Houston Keating MD     Requested Specialty:   Hematology and Oncology     Number of Visits Requested:   1   • Ambulatory Referral to Urology     Referral Priority:   Routine     Referral Type:   Consultation     Referral Reason:   Specialty Services Required     Referred to Provider:   Michi Greer MD     Requested Specialty:    Urology     Number of Visits Requested:   1     RECOMMENDATIONS:  Megan Garcia was diagnosed with Stage IIIB (T3b, N0, cM0) Prostatic Adenocarcinoma, pre-PSA 52.3, [place] (4+4) 8, 12/12 cores positive.     Indications and rationale as well as risks, benefits and alternatives of therapy for carcinoma of the prostate were discussed today. Risks of radiation therapy includes but is not limited to radiation induced proctitis, cystitis, diarrhea, dysuria, frequency and bleeding from the bladder or rectum as well as a significant risk of permanent erectile dysfunction and progression of disease in spite of therapy with either local or systemic failure.  The option of definitive surgery has also been reviewed by the urologist as well as surveillance. We discussed the goals and plans of care with him and his family, answered all questions and he verbalizes understanding. I have seen, examined and reviewed this patient's medication list, appropriate labs and imaging studies as well as other physician notes.    A definitive course of fractionated radiation therapy is recommended to the prostate, anticipate a course of 7000 cGy over 28 treatment fractions. The patient verbalizes understanding, voices no further questions and wishes to proceed with recommended therapy.     Will refer back to urologist for initiation of ADT, seed placement and Space OAR gel placement. Continue ongoing management per primary care physician and other specialists. Thank you for allowing me to assist in his care.    Patient Instructions   1) Plan on 28 daily treatments, Monday-Friday for 30 minutes each. Side effects may include fatigue, urinary urgency and frequency.  2) PSA ASAP  3) Referral to urology for hormone shots, seed/gel placement   4) Referral to Dr. Keating  5) Initiated lupron now. Plan on seed and gel placement in Early August. Daily radiation therapy in mid August to October 2022.     Time Spent: I spent 52 minutes caring for  Megan on this date of service. This time includes time spent by me in the following activities: preparing for the visit, reviewing tests, obtaining and/or reviewing a separately obtained history, performing a medically appropriate examination and/or evaluation, counseling and educating the patient/family/caregiver, ordering medications, tests, or procedures, referring and communicating with other health care professionals, documenting information in the medical record and independently interpreting results and communicating that information with the patient/family/caregiver.   Carroll Trotter III, MD  06/15/2022

## 2022-06-15 ENCOUNTER — CONSULT (OUTPATIENT)
Dept: RADIATION ONCOLOGY | Facility: HOSPITAL | Age: 67
End: 2022-06-15

## 2022-06-15 ENCOUNTER — HOSPITAL ENCOUNTER (OUTPATIENT)
Dept: RADIATION ONCOLOGY | Facility: HOSPITAL | Age: 67
Setting detail: RADIATION/ONCOLOGY SERIES
End: 2022-06-15

## 2022-06-15 VITALS
WEIGHT: 217 LBS | BODY MASS INDEX: 34.87 KG/M2 | SYSTOLIC BLOOD PRESSURE: 159 MMHG | DIASTOLIC BLOOD PRESSURE: 84 MMHG | HEIGHT: 66 IN

## 2022-06-15 DIAGNOSIS — Z87.891 FORMER SMOKER: ICD-10-CM

## 2022-06-15 DIAGNOSIS — C61 PROSTATE CANCER: Primary | ICD-10-CM

## 2022-06-15 PROCEDURE — G0463 HOSPITAL OUTPT CLINIC VISIT: HCPCS | Performed by: RADIOLOGY

## 2022-06-15 NOTE — PATIENT INSTRUCTIONS
1) Plan on 28 daily treatments, Monday-Friday for 30 minutes each. Side effects may include fatigue, urinary urgency and frequency.  2) PSA ASAP  3) Referral to urology for hormone shots, seed/gel placement   4) Referral to Dr. Keating  5) Initiated lupron now. Plan on seed and gel placement in Early August. Daily radiation therapy in mid August to October 2022.

## 2022-06-17 ENCOUNTER — INFUSION (OUTPATIENT)
Dept: ONCOLOGY | Facility: HOSPITAL | Age: 67
End: 2022-06-17

## 2022-06-17 ENCOUNTER — LAB (OUTPATIENT)
Dept: LAB | Facility: HOSPITAL | Age: 67
End: 2022-06-17

## 2022-06-17 VITALS
DIASTOLIC BLOOD PRESSURE: 76 MMHG | OXYGEN SATURATION: 98 % | HEIGHT: 66 IN | WEIGHT: 217 LBS | HEART RATE: 82 BPM | RESPIRATION RATE: 16 BRPM | TEMPERATURE: 97.4 F | SYSTOLIC BLOOD PRESSURE: 120 MMHG | BODY MASS INDEX: 34.87 KG/M2

## 2022-06-17 DIAGNOSIS — Z87.891 FORMER SMOKER: ICD-10-CM

## 2022-06-17 DIAGNOSIS — C61 PROSTATE CANCER: ICD-10-CM

## 2022-06-17 DIAGNOSIS — C61 PROSTATE CANCER: Primary | ICD-10-CM

## 2022-06-17 LAB — PSA SERPL-MCNC: 139 NG/ML (ref 0–4)

## 2022-06-17 PROCEDURE — 36415 COLL VENOUS BLD VENIPUNCTURE: CPT

## 2022-06-17 PROCEDURE — 25010000002 DEGARELIX ACETATE 120 MG/VIAL RECONSTITUTED SOLUTION: Performed by: UROLOGY

## 2022-06-17 PROCEDURE — 96402 CHEMO HORMON ANTINEOPL SQ/IM: CPT

## 2022-06-17 PROCEDURE — 84153 ASSAY OF PSA TOTAL: CPT

## 2022-06-17 RX ADMIN — DEGARELIX 120 MG: KIT at 12:09

## 2022-06-24 NOTE — PROGRESS NOTES
Patient:  James Hu  YOB: 1955  Date of Service: 6/28/2022  MRN: [id number]    Primary Care Physician: EMILIA Lockett    Chief Complaint   Patient presents with    New Patient     Referred by Dr. Yeison Gan for Prostate Cancer       Patient Seen, Chart, Consults notes, Labs, Radiology studies reviewed. Subjective:    James Hu \"Donny\"  is a 78 y.o. male referred by Dr. Jennifer Diaz for Stage III B(T3b, N0, cM0) Prostatic adenocarcinoma with a PSA of 139.0 on 6/17/2022. #1Tumor History: Stage IIIB (T3b, N0, cM0) adenocarcinoma of the prostate on 5/18/2022  Madalyn Em was seen in initial consultation on 6/28/2022, referred by Dr. Jennifer Diaz for an oncological opinion regarding a new diagnosis of prostate cancer. PSA levels  1/14/2019 - 1.71  8/19/2020 - 3.50  4/27/2022 - 52.30  6/17/2022 - 139.00    Appointment with Anton Espana on 05/10/2022:  He has elected to proceed with TRUS and biopsy of the prostate      Prostate biopsy per Anton Espana on 05/18/2022:  Prostate, right mid lateral, needle biopsy:   Adenocarcinoma, acinar type, [place] grade 4+4 = 8, involving approximately 80% of the fragmented biopsied tissue (Grade Group 4). Perineural invasion present. Prostate, left apex, needle biopsy:  Adenocarcinoma, acinar type, [place] grade 4+3 = 7, discontinuously involving approximately 90% of the total length of the biopsied tissue (Grade Group 3). Perineural invasion present. Prostate, left lateral apex, needle biopsy:  Adenocarcinoma, acinar type, [place] grade 4+4 = 8, involving approximately 80% of the fragmented biopsied tissue (Grade Group 4). Perineural invasion present. Prostate, left base, needle biopsy:   Adenocarcinoma, acinar type, [place] grade 4+4 = 8, discontinuously involving greater than 95% of the length of the biopsied tissue (Grade Group 4).   Prostate, left lateral base, needle biopsy:  Adenocarcinoma, acinar type, [place] grade 4+4 = 8, discontinuously involving greater than 95% of the total length of the biopsied tissue (Grade Group 4). Perineural invasion present. Prostate, left mid, needle biopsy:  Adenocarcinoma, acinar type, [place] grade 4+4 = 8, discontinuously involving greater than 95% of the total length of the fragmented biopsy tissue (Grade Group 4). Perineural invasion present. Prostate, left mid lateral, needle biopsy:   Adenocarcinoma, acinar type, [place] grade 4+4 = 8, involving approximately 60% of the fragmented biopsied tissue (Grade Group 4). Prostate, right apex, needle biopsy:   Adenocarcinoma, acinar type, [place] grade 4+4 = 8, involving approximately 60% of the fragmented biopsied tissue (Grade Group 4). Prostate, right lateral apex, needle biopsy:  Adenocarcinoma, acinar type, [place] grade 4+4 = 8, discontinuously involving approximately 80% of the length of the biopsied tissue (Grade Group 4). Perineural invasion present. Prostate, right base, needle biopsy:  Adenocarcinoma, acinar type, [place] grade 4+4 = 8, discontinuously involving greater than 95% of the total length of the biopsied tissue (Grade Group 4). Perineural invasion present. Prostate, right lateral base, needle biopsy:  Adenocarcinoma, acinar type, [place] grade 4+4 = 8, discontinuously involving greater than 95% of the length of the biopsied tissue (Grade Group 4). Perineural invasion present. Prostate, right mid, needle biopsy:   Adenocarcinoma, acinar type, [place] grade 4+4 = 8, discontinuously involving greater than 95% of the length of the biopsied tissue (Grade Group 4). Right seminal vesicle, needle biopsy:   Adenocarcinoma, acinar type, [place] grade 4+4 = 8, discontinuously involving approximately 80% of the length of the biopsied tissue (Grade Group 4). Left seminal vesicle, needle biopsy: Benign seminal vesicle/ejaculatory duct tissue.   AJCC stage: T1c pN not assigned (no lymph nodes submitted or found)     CT ABDOMEN PELVIS W CONTRAST at [facility] on 5/31/2022:  · Prostate is not significantly enlarged. · There is an area of heterogeneous enhancement along the lower posterior aspect of the prostate which is incompletely suboptimally visualized and evaluated  · There are small fat-containing inguinal hernias bilaterally, left larger than the right. · There is a tiny fat-containing umbilical hernia. · No acute abnormality of the abdomen or pelvis, particularly, no evidence of bony metastatic disease  · There is no evidence of abdominal or pelvic lymphadenopathy       NM BONE SCAN WHOLE BODY at [facility] on 5/31/2022:  · There is no scintigraphic evidence of osteoblastic disease. · Marked degenerative changes present in the medial joint compartment of the left knee     Follow Up with  on 6/2/2022:  He has elected to undergo EBRT as definitive management. I recommended that he undergo at least 24 months of androgen deprivation therapy. It appears in the high risk group that patients getting 24-36 months of ADT have improved survival compared to shorter duration. Based on this discussion, the patient has opted for EBRT + 3 years of ADT. Will refer to Dr. Safia Guido. Patient will also be seeing Dr. Solomon Bamberger. He has worked with him because ROSA ZAVALA was a nurse here at [facility] that retired recently. He would like the opinion of an Oncologist. I admit I am unaware of any trials that would involve neoadjuvant chemotherapy or second line hormonal therapy but this has a high likelihood of being needed at some point. This will go ahead and plug him in with Dr. Solomon Bamberger. I think he would feel better to be sure our plans would align. Initial Consult with Dr. Yolanda Mckinney on 6/15/2022: A definitive course of fractionated radiation therapy to the prostate is recommended, anticipate a course of 7000 cGy over 28 treatment fractions. The patient verbalizes understanding, voices no further questions and wishes to proceed with recommended therapy.    PSA ASAP  Referral to urology for hormone shots, seed/gel placement   Referral to Dr. Adrianna Anderson on seed and gel placement in Early August. Daily radiation therapy in mid August to October 2022. EPO=756.00 on 6/17/2022    Dose #1 of Firmagon 240 mg was delivered by Dr. Silvano Serrano on 6/17/2022    Physical examination today, 6/28/2022:  No evidence of palpable peripheral lymphadenopathy. Lungs are clear heart is regular abdomen is soft and benign  Rectal exam is deferred at patient's request having recently been done by Dr. Silvano Serrano. Extremities no cyanosis clubbing edema  Neurological exam is grossly intact    Agree with plans as outlined by Caitlin Rangel and Je Louie for XRT and ADT. TREATMENT SUMMARY:  · ADT treatment was initiated by Dr. Silvano Serrano on 6/17/2022 with Joel Bettencourt with a baseline PSA at 139.0  · XRT to consist of seed and gel placement  in Early August. Daily radiation therapy in mid August to October 2022. Allergies:  Nitroglycerin    Medicines:  Current Outpatient Medications   Medication Sig Dispense Refill    LORazepam (ATIVAN) 1 MG tablet Take 1 tablet by mouth 2 times daily for 90 days. 180 tablet 1    simvastatin (ZOCOR) 40 MG tablet Take 0.5 tablets by mouth nightly 90 tablet 1    furosemide (LASIX) 20 MG tablet Take 1 tablet by mouth daily 180 tablet 1    irbesartan (AVAPRO) 300 MG tablet Take 1 tablet by mouth nightly 90 tablet 1    amLODIPine (NORVASC) 10 MG tablet Take 1 tablet by mouth daily 90 tablet 1    meclizine (ANTIVERT) 25 MG tablet Take 1 tablet by mouth 3 times daily as needed for Dizziness 270 tablet 1    potassium chloride (KLOR-CON M) 20 MEQ extended release tablet Take 20 mEq by mouth every other day      buPROPion (WELLBUTRIN) 75 MG tablet Take 75 mg by mouth daily as needed      cloNIDine (CATAPRES) 0.1 MG tablet Take 1 tablet by mouth as needed for High Blood Pressure 180 tablet 3     No current facility-administered medications for this visit.        Past Medical History:      Diagnosis Date    Anxiety and depression     Atypical chest pain     Elevated lipids     Gastritis     GERD (gastroesophageal reflux disease)     Hepatitis A     Hiatal hernia     History of kidney stones     HTN (hypertension)     Hyperlipidemia     Hypertension     Irregular Z line of esophagus     Libido, decreased     Migraines         Past Surgical History:      Procedure Laterality Date    CHOLECYSTECTOMY      [facility]  11-    [facility]    COLONOSCOPY      COLONOSCOPY  10-3-06    Dr Maddie Chau    COLONOSCOPY  [id number]    Dr Enrique Horne N/A 8/9/2021    Meagan Angle performed by Beau Gutierrez DO at [address] [place] [address] ARTHROSCOPY Bilateral     KNEE SURGERY Bilateral     2 X'S ON LEFT ONCE ON THE RIGHT    UPPER GASTROINTESTINAL ENDOSCOPY  11-     [facility]    UPPER GASTROINTESTINAL ENDOSCOPY      UPPER GASTROINTESTINAL ENDOSCOPY  8-30-01    Dr Rajiv Farrell  10-10-06    Dr Rajiv Farrell  11-18-10    Dr Maddie Chau        Family History:      Problem Relation Age of Onset    Heart Disease Father     Heart Disease Sister     Heart Disease Brother     Stomach Cancer Mother     Cancer Sister         hodgkins    Cancer Mother         Social History  Social History     Tobacco Use    Smoking status: Never Smoker    Smokeless tobacco: Never Used   Vaping Use    Vaping Use: Never used   Substance Use Topics    Alcohol use: Yes     Comment: occ.  Drug use: No          Review of Systems:  Constitutional: Negative for chills, fatigue, fever or significant weight loss. HENT: Negative for congestion, hearing loss, nosebleeds or sore throat. Eyes: Negative for photophobia, pain, discharge, redness and visual disturbance. Respiratory: Negative for cough, shortness of breath, or wheezing. Cardiovascular: Negative for chest pain, palpitations or leg swelling. Gastrointestinal: Negative for abdominal pain, blood in stool, constipation, diarrhea, nausea or vomiting. Genitourinary: Negative for dysuria, flank pain, frequency, hematuria or urgency. Musculoskeletal: Negative for back pain, joint swelling, myalgias or neck pain. Skin: Negative for rash or petechiae. Neurological: Negative for tremors, seizures, syncope, weakness or headaches. Hematological: No active bruising or bleeding. Psychiatric/Behavioral: Negative for hallucinations. Wt Readings from Last 3 Encounters:   06/28/22 214 lb 1.6 oz (97.1 kg)   05/03/22 215 lb (97.5 kg)   02/02/22 218 lb 11.2 oz (99.2 kg)        Objective:  Vital Signs: Blood pressure 136/76, pulse 81, height 5' 6\" (1.676 m), weight 214 lb 1.6 oz (97.1 kg), SpO2 98 %. Physical Exam   Constitutional: Oriented to person, place, and time. No acute distress. Head: Normocephalic and atraumatic. Nose: Nose normal.   Mouth/Throat: Oropharynx is clear and moist. No oropharyngeal exudate. Eyes: Pupils are equal and round. Conjunctivae and EOM are normal. No scleral icterus. Neck: Normal range of motion. Neck supple. No JVD. No appreciable thyromegaly. Cardiovascular: Normal rate, regular rhythm, normal heart sounds and intact distal pulses. Exam reveals no gallop, murmurs or friction rub. Pulmonary/Chest: Effort normal and breath sounds normal. No respiratory distress. No wheezes. Abdominal: Soft. Bowel sounds are normal. No organomegally or masses. No tenderness. There is no rebound and no guarding. Musculoskeletal: Normal range of motion. No edema or tenderness. Lymphadenopathy: No cervical, axillary or inguinal lymphadenopathy. Neurological: Alert and oriented to person, place, and time. Cranial nerves are intact.  Neurological exam is nonfocal  Skin: Skin is warm and dry. No rash noted. No erythema. No pallor. Psychiatric: Judgment normal.          Labs:  BMP: No results for input(s): NA, K, CL, CO2, PHOS, BUN, CREATININE, CALCIUM in the last 72 hours. CBC:   Recent Labs     06/28/22  1125   WBC 6.58   HGB 16.3   HCT 49.5   MCV 99.4*        PT/INR: No results for input(s): PROTIME, INR in the last 72 hours. APTT: No results for input(s): APTT in the last 72 hours. Magnesium:No results for input(s): MG in the last 72 hours. Phosphorus:No results for input(s): PHOS in the last 72 hours. Hepatic: No results for input(s): ALKPHOS, ALT, AST, PROT, BILITOT, BILIDIR, LABALBU in the last 72 hours. Cultures:   No results for input(s): CULTURE in the last 72 hours. ASSESSMENT AND PLAN:    #1  Stage IIIB (T3b, N0, cM0) adenocarcinoma of the prostate on 5/18/2022    Serjio Jewell \"Donny\"  is a 78 y.o. male referred by Dr. Ly Lawler for Stage III B(T3b, N0, cM0) Prostatic adenocarcinoma with a PSA of 139.0 on 6/17/2022     PSA levels  1/14/2019 - 1.71  8/19/2020 - 3.50  4/27/2022 - 52.30  6/17/2022 - 139.00    Appointment with Emilio Driscoll on 05/10/2022:  He has elected to proceed with TRUS and biopsy of the prostate      Prostate biopsy per  on 05/18/2022:  Prostate, right mid lateral, needle biopsy:   Adenocarcinoma, acinar type, [place] grade 4+4 = 8, involving approximately 80% of the fragmented biopsied tissue (Grade Group 4). Perineural invasion present. Prostate, left apex, needle biopsy:  Adenocarcinoma, acinar type, [place] grade 4+3 = 7, discontinuously involving approximately 90% of the total length of the biopsied tissue (Grade Group 3). Perineural invasion present. Prostate, left lateral apex, needle biopsy:  Adenocarcinoma, acinar type, [place] grade 4+4 = 8, involving approximately 80% of the fragmented biopsied tissue (Grade Group 4). Perineural invasion present.   Prostate, left base, needle biopsy:   Adenocarcinoma, acinar type, [place] grade 4+4 = 8, discontinuously involving greater than 95% of the length of the biopsied tissue (Grade Group 4). Prostate, left lateral base, needle biopsy:  Adenocarcinoma, acinar type, [place] grade 4+4 = 8, discontinuously involving greater than 95% of the total length of the biopsied tissue (Grade Group 4). Perineural invasion present. Prostate, left mid, needle biopsy:  Adenocarcinoma, acinar type, [place] grade 4+4 = 8, discontinuously involving greater than 95% of the total length of the fragmented biopsy tissue (Grade Group 4). Perineural invasion present. Prostate, left mid lateral, needle biopsy:   Adenocarcinoma, acinar type, [place] grade 4+4 = 8, involving approximately 60% of the fragmented biopsied tissue (Grade Group 4). Prostate, right apex, needle biopsy:   Adenocarcinoma, acinar type, [place] grade 4+4 = 8, involving approximately 60% of the fragmented biopsied tissue (Grade Group 4). Prostate, right lateral apex, needle biopsy:  Adenocarcinoma, acinar type, [place] grade 4+4 = 8, discontinuously involving approximately 80% of the length of the biopsied tissue (Grade Group 4). Perineural invasion present. Prostate, right base, needle biopsy:  Adenocarcinoma, acinar type, [place] grade 4+4 = 8, discontinuously involving greater than 95% of the total length of the biopsied tissue (Grade Group 4). Perineural invasion present. Prostate, right lateral base, needle biopsy:  Adenocarcinoma, acinar type, [place] grade 4+4 = 8, discontinuously involving greater than 95% of the length of the biopsied tissue (Grade Group 4). Perineural invasion present. Prostate, right mid, needle biopsy:   Adenocarcinoma, acinar type, [place] grade 4+4 = 8, discontinuously involving greater than 95% of the length of the biopsied tissue (Grade Group 4).   Right seminal vesicle, needle biopsy:   Adenocarcinoma, acinar type, [place] grade 4+4 = 8, discontinuously involving approximately 80% of the length of the biopsied tissue (Grade Group 4). Left seminal vesicle, needle biopsy: Benign seminal vesicle/ejaculatory duct tissue. AJCC stage: T1c pN not assigned (no lymph nodes submitted or found)     CT ABDOMEN PELVIS W CONTRAST at [facility] on 5/31/2022:  · Prostate is not significantly enlarged. · There is an area of heterogeneous enhancement along the lower posterior aspect of the prostate which is incompletely suboptimally visualized and evaluated  · There are small fat-containing inguinal hernias bilaterally, left larger than the right. · There is a tiny fat-containing umbilical hernia. · No acute abnormality of the abdomen or pelvis, particularly, no evidence of bony metastatic disease  · There is no evidence of abdominal or pelvic lymphadenopathy       NM BONE SCAN WHOLE BODY at [facility] on 5/31/2022:  · There is no scintigraphic evidence of osteoblastic disease. · Marked degenerative changes present in the medial joint compartment of the left knee     Follow Up with  on 6/2/2022:  He has elected to undergo EBRT as definitive management. I recommended that he undergo at least 24 months of androgen deprivation therapy. It appears in the high risk group that patients getting 24-36 months of ADT have improved survival compared to shorter duration. Based on this discussion, the patient has opted for EBRT + 3 years of ADT. Will refer to Dr. Greg Benítez. Patient will also be seeing Dr. Ellen Giron. He has worked with him because ROSA ZAVALA was a nurse here at [facility] that retired recently. He would like the opinion of an Oncologist. I admit I am unaware of any trials that would involve neoadjuvant chemotherapy or second line hormonal therapy but this has a high likelihood of being needed at some point. This will go ahead and plug him in with Dr. Ellen Giron. I think he would feel better to be sure our plans would align. Initial Consult with Dr. Kodak Larry on 6/15/2022:   A definitive course of fractionated radiation therapy to the prostate is recommended, anticipate a course of 7000 cGy over 28 treatment fractions. The patient verbalizes understanding, voices no further questions and wishes to proceed with recommended therapy. PSA ASAP  Referral to urology for hormone shots, seed/gel placement   Referral to Dr. Bo Salgado on seed and gel placement in Early August. Daily radiation therapy in mid August to October 2022. KCT=321.00 on 6/17/2022    Dose #1 of Firmagon 240 mg was delivered by Dr. Yolanda Car on 6/17/2022    Physical examination today, 6/28/2022:  No evidence of palpable peripheral lymphadenopathy. Lungs are clear heart is regular abdomen is soft and benign  Rectal exam is deferred at patient's request having recently been done by Dr. Yolanda Car. Extremities no cyanosis clubbing edema  Neurological exam is grossly intact    Agree with plans as outlined by Caitlin Bergman and Kyleigh Cochran for XRT and ADT. TREATMENT SUMMARY:  · ADT treatment was initiated by Dr. Yoladna Car on 6/17/2022 with Leata Cousin with a baseline PSA at 139.0  · XRT to consist of seed and gel placement  in Early August. Daily radiation therapy in mid August to October 2022. I discussed the overall plan with Christiano and his significant other, being presented by Dr. Yolanda Car and Dr. Moiz Sroia as standard approach. I would not add anything else at this juncture. A follow-up appointment will be given in 4 months to review his initial response and then periodically thereafter. I will be available should there be a change in his response or need for other intervention.       #2  TUMOR SCREENING AND HEALTH MAINTENANCE    GI cancer screening  [facility] on 9/7/2021 was negative       #3  Immunizations:  Immunization History   Administered Date(s) Administered    COVID-19, MODERNA BLUE border, Primary or Immunocompromised, (age 12y+), IM, 100 mcg/0.5mL 02/16/2021, 03/16/2021    Influenza, Quadv, IM, PF (6 mo and older Fluzone, Flulaval, Fluarix, and 3 yrs and older Afluria) 10/27/2020    Influenza, Quadv, adjuvanted, 65 yrs +, IM, PF (Fluad) 11/02/2021    Influenza, Triv, inactivated, subunit, adjuvanted, IM (Fluad 65 yrs and older) 11/05/2019    PPD Test 10/27/2020    Tdap (Boostrix, Adacel) 10/14/2019               There are no diagnoses linked to this encounter. No orders of the defined types were placed in this encounter. No orders of the defined types were placed in this encounter. Return in about 4 months (around 10/28/2022) for Follow Up with Rika Smallwood

## 2022-06-27 ENCOUNTER — PREP FOR SURGERY (OUTPATIENT)
Dept: UROLOGY | Facility: CLINIC | Age: 67
End: 2022-06-27

## 2022-06-27 DIAGNOSIS — C61 PROSTATE CANCER: Primary | ICD-10-CM

## 2022-06-29 ENCOUNTER — TELEPHONE (OUTPATIENT)
Dept: UROLOGY | Facility: CLINIC | Age: 67
End: 2022-06-29

## 2022-06-29 NOTE — TELEPHONE ENCOUNTER
Called patient and went over date and time also all instructions for the procedure. Pt verbalized understanding.

## 2022-07-01 ENCOUNTER — HOSPITAL ENCOUNTER (OUTPATIENT)
Dept: RADIATION ONCOLOGY | Facility: HOSPITAL | Age: 67
Setting detail: RADIATION/ONCOLOGY SERIES
End: 2022-07-01

## 2022-07-06 ENCOUNTER — LAB (OUTPATIENT)
Dept: LAB | Facility: HOSPITAL | Age: 67
End: 2022-07-06

## 2022-07-06 ENCOUNTER — PRE-ADMISSION TESTING (OUTPATIENT)
Dept: PREADMISSION TESTING | Facility: HOSPITAL | Age: 67
End: 2022-07-06

## 2022-07-06 VITALS
HEIGHT: 66 IN | SYSTOLIC BLOOD PRESSURE: 160 MMHG | WEIGHT: 213.85 LBS | DIASTOLIC BLOOD PRESSURE: 80 MMHG | HEART RATE: 70 BPM | OXYGEN SATURATION: 98 % | RESPIRATION RATE: 18 BRPM | BODY MASS INDEX: 34.37 KG/M2

## 2022-07-06 DIAGNOSIS — C61 PROSTATE CANCER: ICD-10-CM

## 2022-07-06 LAB
ANION GAP SERPL CALCULATED.3IONS-SCNC: 7 MMOL/L (ref 5–15)
BUN SERPL-MCNC: 14 MG/DL (ref 8–23)
BUN/CREAT SERPL: 16.3 (ref 7–25)
CALCIUM SPEC-SCNC: 9.8 MG/DL (ref 8.6–10.5)
CHLORIDE SERPL-SCNC: 105 MMOL/L (ref 98–107)
CO2 SERPL-SCNC: 32 MMOL/L (ref 22–29)
CREAT SERPL-MCNC: 0.86 MG/DL (ref 0.76–1.27)
DEPRECATED RDW RBC AUTO: 43.8 FL (ref 37–54)
EGFRCR SERPLBLD CKD-EPI 2021: 95.5 ML/MIN/1.73
ERYTHROCYTE [DISTWIDTH] IN BLOOD BY AUTOMATED COUNT: 12.3 % (ref 12.3–15.4)
GLUCOSE SERPL-MCNC: 105 MG/DL (ref 65–99)
HCT VFR BLD AUTO: 46 % (ref 37.5–51)
HGB BLD-MCNC: 15.4 G/DL (ref 13–17.7)
MCH RBC QN AUTO: 32.2 PG (ref 26.6–33)
MCHC RBC AUTO-ENTMCNC: 33.5 G/DL (ref 31.5–35.7)
MCV RBC AUTO: 96 FL (ref 79–97)
PLATELET # BLD AUTO: 240 10*3/MM3 (ref 140–450)
PMV BLD AUTO: 9.8 FL (ref 6–12)
POTASSIUM SERPL-SCNC: 4.1 MMOL/L (ref 3.5–5.2)
RBC # BLD AUTO: 4.79 10*6/MM3 (ref 4.14–5.8)
SARS-COV-2 ORF1AB RESP QL NAA+PROBE: NOT DETECTED
SODIUM SERPL-SCNC: 144 MMOL/L (ref 136–145)
WBC NRBC COR # BLD: 5.01 10*3/MM3 (ref 3.4–10.8)

## 2022-07-06 PROCEDURE — 85027 COMPLETE CBC AUTOMATED: CPT

## 2022-07-06 PROCEDURE — C9803 HOPD COVID-19 SPEC COLLECT: HCPCS

## 2022-07-06 PROCEDURE — U0005 INFEC AGEN DETEC AMPLI PROBE: HCPCS

## 2022-07-06 PROCEDURE — U0004 COV-19 TEST NON-CDC HGH THRU: HCPCS

## 2022-07-06 PROCEDURE — 80048 BASIC METABOLIC PNL TOTAL CA: CPT

## 2022-07-06 PROCEDURE — 36415 COLL VENOUS BLD VENIPUNCTURE: CPT

## 2022-07-06 NOTE — DISCHARGE INSTRUCTIONS
Before you come to the hospital        Arrival time: AS DIRECTED BY OFFICE     YOU MAY TAKE THE FOLLOWING MEDICATION(S) THE MORNING OF SURGERY WITH A SIP OF WATER: Tylenol #4 if needed for pain, Norco if needed for pain, Ativan if needed for anxiety    Do NOT take your Irbesartan within 24 hours of surgery as directed by anesthesia           ALL OTHER HOME MEDICATION CHECK WITH YOUR PHYSICIAN (especially if you are taking diabetes medicines or blood thinners)    Do not take any Erectile Dysfunction medications (EX: CIALIS, VIAGRA) 24 hours prior to surgery      If you were given and instructed to use a germ- killing soap, use as directed the night before surgery and the morning of surgery before coming to the hospital.             Eating and drinking restrictions prior to scheduled arrival time    2 Hours before arrival time STOP   Drinking Clear liquids (water, apple juice-no pulp)     6 Hours before arrival time STOP   Milk or drinks that contain milk, full liquids    6 Hours before arrival time STOP   Light meals or foods, such as toast or cereal    8 Hours before arrival time STOP   Heavy foods, such as meat, fried foods, or fatty foods    (It is extremely important that you follow these guidelines to prevent delay or cancelation of your procedure)     Clear Liquids  Water and flavored water                                                                      Clear Fruit juices, such as cranberry juice and apple juice.  Black coffee (NO cream of any kind, including powdered).  Plain tea  Clear bouillon or broth.  Flavored gelatin.  Soda.  Gatorade or Powerade.  Full liquid examples  Juices that have pulp.  Frozen ice pops that contain fruit pieces.  Coffee with creamer  Milk.  Yogurt.              MANAGING PAIN AFTER SURGERY    We know you are probably wondering what your pain will be like after surgery.  Following surgery it is unrealistic to expect you will not have pain.   Pain is how our bodies let us know  that something is wrong or cautions us to be careful.  That said, our goal is to make your pain tolerable.    Methods we may use to treat your pain include (oral or IV medications, PCAs, epidurals, nerve blocks, etc.)   While some procedures require IV pain medications for a short time after surgery, transitioning to pain medications by mouth allows for better management of pain.   Your nurse will encourage you to take oral pain medications whenever possible.  IV medications work almost immediately, but only last a short while.  Taking medications by mouth allows for a more constant level of medication in your blood stream for a longer period of time.      Once your pain is out of control it is harder to get back under control.  It is important you are aware when your next dose of pain medication is due.  If you are admitted, your nurse may write the time of your next dose on the white board in your room to help you remember.      We are interested in your pain and encourage you to inform us about aggravating factors during your visit.   Many times a simple repositioning every few hours can make a big difference.    If your physician says it is okay, do not let your pain prevent you from getting out of bed. Be sure to call your nurse for assistance prior to getting up so you do not fall.      Before surgery, please decide your tolerable pain goal.  These faces help describe the pain ratings we use on a 0-10 scale.   Be prepared to tell us your goal and whether or not you take pain or anxiety medications at home.

## 2022-07-08 ENCOUNTER — ANESTHESIA (OUTPATIENT)
Dept: PERIOP | Facility: HOSPITAL | Age: 67
End: 2022-07-08

## 2022-07-08 ENCOUNTER — ANESTHESIA EVENT (OUTPATIENT)
Dept: PERIOP | Facility: HOSPITAL | Age: 67
End: 2022-07-08

## 2022-07-08 ENCOUNTER — HOSPITAL ENCOUNTER (OUTPATIENT)
Facility: HOSPITAL | Age: 67
Setting detail: HOSPITAL OUTPATIENT SURGERY
Discharge: HOME | End: 2022-07-08
Attending: UROLOGY | Admitting: UROLOGY

## 2022-07-08 VITALS
SYSTOLIC BLOOD PRESSURE: 134 MMHG | DIASTOLIC BLOOD PRESSURE: 79 MMHG | HEART RATE: 64 BPM | OXYGEN SATURATION: 96 % | TEMPERATURE: 97.1 F | RESPIRATION RATE: 16 BRPM

## 2022-07-08 DIAGNOSIS — C61 PROSTATE CANCER: ICD-10-CM

## 2022-07-08 PROCEDURE — 25010000002 PROPOFOL 10 MG/ML EMULSION

## 2022-07-08 PROCEDURE — S0260 H&P FOR SURGERY: HCPCS | Performed by: UROLOGY

## 2022-07-08 PROCEDURE — 0 LIDOCAINE 1 % SOLUTION: Performed by: UROLOGY

## 2022-07-08 PROCEDURE — 25010000002 CEFAZOLIN PER 500 MG: Performed by: UROLOGY

## 2022-07-08 PROCEDURE — 25010000002 FENTANYL CITRATE (PF) 100 MCG/2ML SOLUTION

## 2022-07-08 PROCEDURE — 55876 PLACE RT DEVICE/MARKER PROS: CPT | Performed by: UROLOGY

## 2022-07-08 PROCEDURE — A4648 IMPLANTABLE TISSUE MARKER: HCPCS | Performed by: UROLOGY

## 2022-07-08 PROCEDURE — 55874 TPRNL PLMT BIODEGRDABL MATRL: CPT | Performed by: UROLOGY

## 2022-07-08 PROCEDURE — C1889 IMPLANT/INSERT DEVICE, NOC: HCPCS | Performed by: UROLOGY

## 2022-07-08 DEVICE — SYS HYDROGEL SPACEOAR VUE 10ML: Type: IMPLANTABLE DEVICE | Site: PROSTATE | Status: FUNCTIONAL

## 2022-07-08 DEVICE — IMPLANTABLE DEVICE
Type: IMPLANTABLE DEVICE | Site: PROSTATE | Status: FUNCTIONAL
Brand: GOLD FIDUCIAL

## 2022-07-08 RX ORDER — LIDOCAINE HYDROCHLORIDE 10 MG/ML
0.5 INJECTION, SOLUTION EPIDURAL; INFILTRATION; INTRACAUDAL; PERINEURAL ONCE AS NEEDED
Status: DISCONTINUED | OUTPATIENT
Start: 2022-07-08 | End: 2022-07-08 | Stop reason: HOSPADM

## 2022-07-08 RX ORDER — NEOSTIGMINE METHYLSULFATE 5 MG/5 ML
SYRINGE (ML) INTRAVENOUS AS NEEDED
Status: DISCONTINUED | OUTPATIENT
Start: 2022-07-08 | End: 2022-07-08 | Stop reason: SURG

## 2022-07-08 RX ORDER — OXYCODONE AND ACETAMINOPHEN 7.5; 325 MG/1; MG/1
2 TABLET ORAL EVERY 4 HOURS PRN
Status: DISCONTINUED | OUTPATIENT
Start: 2022-07-08 | End: 2022-07-08 | Stop reason: HOSPADM

## 2022-07-08 RX ORDER — ONDANSETRON 2 MG/ML
4 INJECTION INTRAMUSCULAR; INTRAVENOUS ONCE AS NEEDED
Status: DISCONTINUED | OUTPATIENT
Start: 2022-07-08 | End: 2022-07-08 | Stop reason: HOSPADM

## 2022-07-08 RX ORDER — FLUMAZENIL 0.1 MG/ML
0.2 INJECTION INTRAVENOUS AS NEEDED
Status: DISCONTINUED | OUTPATIENT
Start: 2022-07-08 | End: 2022-07-08 | Stop reason: HOSPADM

## 2022-07-08 RX ORDER — DROPERIDOL 2.5 MG/ML
0.62 INJECTION, SOLUTION INTRAMUSCULAR; INTRAVENOUS ONCE AS NEEDED
Status: DISCONTINUED | OUTPATIENT
Start: 2022-07-08 | End: 2022-07-08 | Stop reason: HOSPADM

## 2022-07-08 RX ORDER — LIDOCAINE HYDROCHLORIDE 10 MG/ML
INJECTION, SOLUTION INFILTRATION; PERINEURAL AS NEEDED
Status: DISCONTINUED | OUTPATIENT
Start: 2022-07-08 | End: 2022-07-08 | Stop reason: HOSPADM

## 2022-07-08 RX ORDER — HYDROCODONE BITARTRATE AND ACETAMINOPHEN 5; 325 MG/1; MG/1
1 TABLET ORAL ONCE AS NEEDED
Status: DISCONTINUED | OUTPATIENT
Start: 2022-07-08 | End: 2022-07-08 | Stop reason: HOSPADM

## 2022-07-08 RX ORDER — FENTANYL CITRATE 50 UG/ML
25 INJECTION, SOLUTION INTRAMUSCULAR; INTRAVENOUS
Status: DISCONTINUED | OUTPATIENT
Start: 2022-07-08 | End: 2022-07-08 | Stop reason: HOSPADM

## 2022-07-08 RX ORDER — PROPOFOL 10 MG/ML
VIAL (ML) INTRAVENOUS AS NEEDED
Status: DISCONTINUED | OUTPATIENT
Start: 2022-07-08 | End: 2022-07-08 | Stop reason: SURG

## 2022-07-08 RX ORDER — ACETAMINOPHEN 500 MG
1000 TABLET ORAL ONCE
Status: COMPLETED | OUTPATIENT
Start: 2022-07-08 | End: 2022-07-08

## 2022-07-08 RX ORDER — NALOXONE HCL 0.4 MG/ML
0.4 VIAL (ML) INJECTION AS NEEDED
Status: DISCONTINUED | OUTPATIENT
Start: 2022-07-08 | End: 2022-07-08 | Stop reason: HOSPADM

## 2022-07-08 RX ORDER — LIDOCAINE HYDROCHLORIDE 20 MG/ML
INJECTION, SOLUTION EPIDURAL; INFILTRATION; INTRACAUDAL; PERINEURAL AS NEEDED
Status: DISCONTINUED | OUTPATIENT
Start: 2022-07-08 | End: 2022-07-08 | Stop reason: SURG

## 2022-07-08 RX ORDER — SODIUM CHLORIDE 0.9 % (FLUSH) 0.9 %
3 SYRINGE (ML) INJECTION EVERY 12 HOURS SCHEDULED
Status: DISCONTINUED | OUTPATIENT
Start: 2022-07-08 | End: 2022-07-08 | Stop reason: HOSPADM

## 2022-07-08 RX ORDER — HYDROCODONE BITARTRATE AND ACETAMINOPHEN 5; 325 MG/1; MG/1
1 TABLET ORAL EVERY 8 HOURS PRN
Qty: 6 TABLET | Refills: 0 | Status: SHIPPED | OUTPATIENT
Start: 2022-07-08

## 2022-07-08 RX ORDER — SODIUM CHLORIDE 0.9 % (FLUSH) 0.9 %
3-10 SYRINGE (ML) INJECTION AS NEEDED
Status: DISCONTINUED | OUTPATIENT
Start: 2022-07-08 | End: 2022-07-08 | Stop reason: HOSPADM

## 2022-07-08 RX ORDER — FENTANYL CITRATE 50 UG/ML
INJECTION, SOLUTION INTRAMUSCULAR; INTRAVENOUS AS NEEDED
Status: DISCONTINUED | OUTPATIENT
Start: 2022-07-08 | End: 2022-07-08 | Stop reason: SURG

## 2022-07-08 RX ORDER — SODIUM CHLORIDE, SODIUM LACTATE, POTASSIUM CHLORIDE, CALCIUM CHLORIDE 600; 310; 30; 20 MG/100ML; MG/100ML; MG/100ML; MG/100ML
1000 INJECTION, SOLUTION INTRAVENOUS CONTINUOUS
Status: DISCONTINUED | OUTPATIENT
Start: 2022-07-08 | End: 2022-07-08 | Stop reason: HOSPADM

## 2022-07-08 RX ORDER — OXYCODONE AND ACETAMINOPHEN 10; 325 MG/1; MG/1
1 TABLET ORAL ONCE AS NEEDED
Status: COMPLETED | OUTPATIENT
Start: 2022-07-08 | End: 2022-07-08

## 2022-07-08 RX ORDER — LABETALOL HYDROCHLORIDE 5 MG/ML
5 INJECTION, SOLUTION INTRAVENOUS
Status: DISCONTINUED | OUTPATIENT
Start: 2022-07-08 | End: 2022-07-08 | Stop reason: HOSPADM

## 2022-07-08 RX ORDER — SODIUM CHLORIDE, SODIUM LACTATE, POTASSIUM CHLORIDE, CALCIUM CHLORIDE 600; 310; 30; 20 MG/100ML; MG/100ML; MG/100ML; MG/100ML
100 INJECTION, SOLUTION INTRAVENOUS CONTINUOUS
Status: DISCONTINUED | OUTPATIENT
Start: 2022-07-08 | End: 2022-07-08 | Stop reason: HOSPADM

## 2022-07-08 RX ORDER — 0.9 % SODIUM CHLORIDE 0.9 %
VIAL (ML) INJECTION AS NEEDED
Status: DISCONTINUED | OUTPATIENT
Start: 2022-07-08 | End: 2022-07-08 | Stop reason: HOSPADM

## 2022-07-08 RX ORDER — ROCURONIUM BROMIDE 10 MG/ML
INJECTION, SOLUTION INTRAVENOUS AS NEEDED
Status: DISCONTINUED | OUTPATIENT
Start: 2022-07-08 | End: 2022-07-08 | Stop reason: SURG

## 2022-07-08 RX ORDER — PHENYLEPHRINE HCL IN 0.9% NACL 1 MG/10 ML
SYRINGE (ML) INTRAVENOUS AS NEEDED
Status: DISCONTINUED | OUTPATIENT
Start: 2022-07-08 | End: 2022-07-08 | Stop reason: SURG

## 2022-07-08 RX ORDER — MIDAZOLAM HYDROCHLORIDE 1 MG/ML
0.5 INJECTION INTRAMUSCULAR; INTRAVENOUS
Status: DISCONTINUED | OUTPATIENT
Start: 2022-07-08 | End: 2022-07-08 | Stop reason: HOSPADM

## 2022-07-08 RX ORDER — BUPIVACAINE HCL/0.9 % NACL/PF 0.1 %
2 PLASTIC BAG, INJECTION (ML) EPIDURAL ONCE
Status: COMPLETED | OUTPATIENT
Start: 2022-07-08 | End: 2022-07-08

## 2022-07-08 RX ORDER — IBUPROFEN 600 MG/1
600 TABLET ORAL ONCE AS NEEDED
Status: DISCONTINUED | OUTPATIENT
Start: 2022-07-08 | End: 2022-07-08 | Stop reason: HOSPADM

## 2022-07-08 RX ORDER — SODIUM CHLORIDE 0.9 % (FLUSH) 0.9 %
3 SYRINGE (ML) INJECTION AS NEEDED
Status: DISCONTINUED | OUTPATIENT
Start: 2022-07-08 | End: 2022-07-08 | Stop reason: HOSPADM

## 2022-07-08 RX ADMIN — SODIUM CHLORIDE, POTASSIUM CHLORIDE, SODIUM LACTATE AND CALCIUM CHLORIDE 1000 ML: 600; 310; 30; 20 INJECTION, SOLUTION INTRAVENOUS at 06:46

## 2022-07-08 RX ADMIN — PROPOFOL 150 MG: 10 INJECTION, EMULSION INTRAVENOUS at 08:37

## 2022-07-08 RX ADMIN — LIDOCAINE HYDROCHLORIDE 100 MG: 20 INJECTION, SOLUTION EPIDURAL; INFILTRATION; INTRACAUDAL; PERINEURAL at 08:37

## 2022-07-08 RX ADMIN — Medication 3 MG: at 09:01

## 2022-07-08 RX ADMIN — Medication 150 MCG: at 08:50

## 2022-07-08 RX ADMIN — Medication 2 G: at 08:43

## 2022-07-08 RX ADMIN — GLYCOPYRROLATE 0.4 MG: 0.2 INJECTION INTRAMUSCULAR; INTRAVENOUS at 09:01

## 2022-07-08 RX ADMIN — ROCURONIUM BROMIDE 25 MG: 10 SOLUTION INTRAVENOUS at 08:38

## 2022-07-08 RX ADMIN — ACETAMINOPHEN 1000 MG: 500 TABLET, FILM COATED ORAL at 08:15

## 2022-07-08 RX ADMIN — Medication 150 MCG: at 08:59

## 2022-07-08 RX ADMIN — FENTANYL CITRATE 100 MCG: 50 INJECTION, SOLUTION INTRAMUSCULAR; INTRAVENOUS at 08:37

## 2022-07-08 RX ADMIN — OXYCODONE AND ACETAMINOPHEN 1 TABLET: 325; 10 TABLET ORAL at 09:24

## 2022-07-08 NOTE — ANESTHESIA POSTPROCEDURE EVALUATION
Patient: Megan Garcia    Procedure Summary     Date: 07/08/22 Room / Location:  [facility]  /  [facility]    Anesthesia Start: 0833 Anesthesia Stop: 0913    Procedure: TRANSRECTAL ULTRASOUND GUIDED PLACEMENT PERIRECTAL SPACER APPLICATION FOR RADIATION TREATMENT AND PROSTATE FIDUCIARY MARKERS (N/A Rectum) Diagnosis:       Prostate cancer (HCC)      (Prostate cancer (HCC) [C61])    Surgeons: Michi Greer MD Provider: Houston Marquez CRNA    Anesthesia Type: general ASA Status: 2          Anesthesia Type: general    Vitals  Vitals Value Taken Time   /89 07/08/22 0930   Temp 97.1 °F (36.2 °C) 07/08/22 0930   Pulse 70 07/08/22 0932   Resp 14 07/08/22 0930   SpO2 97 % 07/08/22 0932   Vitals shown include unvalidated device data.        Post Anesthesia Care and Evaluation    Patient location during evaluation: PACU  Patient participation: complete - patient participated  Level of consciousness: awake and alert  Pain management: adequate    Airway patency: patent  Anesthetic complications: No anesthetic complications    Cardiovascular status: acceptable  Respiratory status: acceptable  Hydration status: acceptable    Comments: Blood pressure 118/78, pulse 63, temperature 97.1 °F (36.2 °C), temperature source Temporal, resp. rate 16, SpO2 94 %.    Pt discharged from PACU based on irais score >8

## 2022-07-08 NOTE — H&P
Urology H&P    Mr. Garcia is 67 y.o. male    CHIEF COMPLAINT: Patient is here for SpaceOAR and fiduciary markers  HPI  Patient with adenocarcinoma of the prostate to undergo EBRT.  He is here today for SpaceOAR and fiduciary markers.    The following portions of the patient's history were reviewed and updated as appropriate: allergies, current medications, past family history, past medical history, past social history, past surgical history and problem list.    Constitutional: Negative for chills and fever.   Gastrointestinal: Negative for abdominal pain, anal bleeding and blood in stool.   Genitourinary: Negative for flank pain and hematuria.      Medications Prior to Admission   Medication Sig Dispense Refill Last Dose   • acetaminophen-codeine (TYLENOL #4) 300-60 MG per tablet Take 1 tablet by mouth Every 6 (Six) Hours As Needed.   Past Week at Unknown time   • amLODIPine (NORVASC) 10 MG tablet Take 1 tablet by mouth Daily.   7/7/2022 at 0900   • furosemide (LASIX) 20 MG tablet Take 20 mg by mouth Daily.   7/7/2022 at 0900   • HYDROcodone-acetaminophen (NORCO) 5-325 MG per tablet Take 1 tablet by mouth Every 8 (Eight) Hours As Needed for Moderate Pain  (Pain). 6 tablet 0 Past Week at Unknown time   • irbesartan (AVAPRO) 300 MG tablet Take 300 mg by mouth Daily.   7/7/2022 at 0900   • LORazepam (ATIVAN) 1 MG tablet Take 1 mg by mouth Every 6 (Six) Hours As Needed.   7/8/2022 at 0545   • simvastatin (ZOCOR) 20 MG tablet Take 20 mg by mouth Every Night.   7/7/2022 at 2200   • potassium chloride (K-DUR,KLOR-CON) 20 MEQ CR tablet Take 20 mEq by mouth Daily.   7/6/2022         Current Facility-Administered Medications:   •  ceFAZolin in 0.9% normal saline (ANCEF) IVPB solution 2 g, 2 g, Intravenous, Once, Michi Greer MD  •  lactated ringers infusion 1,000 mL, 1,000 mL, Intravenous, Continuous, Michi Greer MD, Last Rate: 25 mL/hr at 07/08/22 0646, 1,000 mL at 07/08/22 0646  •  lidocaine PF 1% (XYLOCAINE)  injection 0.5 mL, 0.5 mL, Intradermal, Once PRN, Michi Greer MD  •  sodium chloride 0.9 % flush 3 mL, 3 mL, Intravenous, PRN, Michi Greer MD    Past Medical History:   Diagnosis Date   • Hyperlipidemia    • Hypertension    • Neuropathic pain    • Prostate cancer (HCC)    • UTI (urinary tract infection)        Past Surgical History:   Procedure Laterality Date   • KNEE ACL RECONSTRUCTION     • KNEE ARTHROSCOPY     • PROSTATE ULTRASOUND BIOPSY N/A 5/18/2022    Procedure: PROSTATE ULTRASOUND BIOPSY. NOT A URONAV;  Surgeon: Michi Greer MD;  Location: [facility] OR;  Service: Urology;  Laterality: N/A;       Social History     Socioeconomic History   • Marital status: Single   Tobacco Use   • Smoking status: Former Smoker   • Smokeless tobacco: Never Used   • Tobacco comment: quit 20 years ago   Vaping Use   • Vaping Use: Never used   Substance and Sexual Activity   • Alcohol use: Yes     Comment: occ   • Drug use: No   • Sexual activity: Defer       History reviewed. No pertinent family history.    /86 (BP Location: Left arm, Patient Position: Sitting)   Pulse 68   Temp 97.8 °F (36.6 °C) (Temporal)   Resp 16   SpO2 96%     Constitutional:  Patient appears well-developed and well-nourished. There are no obvious deformities. No distress. The vital signs are reviewed  Pulmonary/Chest: Effort normal.   GI: Soft. The patient exhibits no distension and no mass. There is no tenderness. There is no rebound and no guarding. No hernia.   Neurological: Patient is alert and oriented to person, place, and time.   Skin: Skin is warm and dry. Not diaphoretic.   Psychiatric:  normal mood and affect. Not agitated.       Lab Results   Component Value Date    GLUCOSE 105 (H) 07/06/2022    BUN 14 07/06/2022    CREATININE 0.86 07/06/2022    EGFRIFNONA >60 04/27/2022    EGFRIFAFRI >59 04/27/2022    BCR 16.3 07/06/2022    CO2 32.0 (H) 07/06/2022    CALCIUM 9.8 07/06/2022    ALBUMIN 4.60 05/02/2022    AST 19 05/02/2022     ALT 22 05/02/2022     Lab Results   Component Value Date    GLUCOSE 105 (H) 07/06/2022    CALCIUM 9.8 07/06/2022     07/06/2022    K 4.1 07/06/2022    CO2 32.0 (H) 07/06/2022     07/06/2022    BUN 14 07/06/2022    CREATININE 0.86 07/06/2022    EGFRIFAFRI >59 04/27/2022    EGFRIFNONA >60 04/27/2022    BCR 16.3 07/06/2022    ANIONGAP 7.0 07/06/2022     Lab Results   Component Value Date    WBC 5.01 07/06/2022    HGB 15.4 07/06/2022    HCT 46.0 07/06/2022    MCV 96.0 07/06/2022     07/06/2022     Lab Results   Component Value Date    .000 (H) 06/17/2022    PSA 52.30 (H) 04/27/2022    PSA 3.50 08/19/2020     No results found for: URINECX  Brief Urine Lab Results  (Last result in the past 365 days)      Color   Clarity   Blood   Leuk Est   Nitrite   Protein   CREAT   Urine HCG        05/02/22 1452 Yellow   Clear   Negative   Negative   Negative   Negative                   Imaging Results (Last 7 Days)     ** No results found for the last 168 hours. **          Assessment and Plan  Prostate cancer-EBRT plan  -Plan SpaceOAR gel application and fiduciary markers. The risks, alternatives, and benefits of this treatment recommendation are discussed.  I did answer all questions of the patient.        (Please note that portions of this note were completed with a voice recognition program.)  Michi Greer MD  07/08/22  07:40 CDT

## 2022-07-08 NOTE — OP NOTE
Operative Summary    Megan Garcia  Date of Procedure: 7/8/2022    Pre-op Diagnosis:   Prostate cancer (HCC) [C61]    Post-op Diagnosis:     Post-Op Diagnosis Codes:     * Prostate cancer (HCC) [C61]    Procedure/CPT® Codes:      Procedure(s):  TRANSRECTAL ULTRASOUND GUIDED PLACEMENT PERIRECTAL SPACER APPLICATION FOR RADIATION TREATMENT AND PROSTATE FIDUCIARY MARKERS    Surgeon(s):  Michi Greer MD    Anesthesia: General    Staff:   Circulator: Saloni Gillette RN  Scrub Person: Tomi Hand; Ariane Gore    Indications for procedure:   Patient with prostate cancer that is opted for external beam radiotherapy as primary curative treatment.    Procedure details:  After appropriate anesthesia, positioning, prep and drape, timeout protocol was observed.     Patient is positioned in the dorsal lithotomy position and the perineal skin is prepped with povidone-iodine per the standard practice. Sterile drapes are then placed on the patient’s legs, genitals, ultrasound probe and the stepper.  At this point I placed the fiduciary seeds.  Local anesthetic with lidocaine is injected to anesthetize the tracts.  Using both transverse sagittal imaging the needles that contained the seeds were inserted in the extreme left of the gland, just to the right of the urethra at the base and just to the right of the urethra at the apex.  Once the patient is anesthetized, implantation of the SpaceOAR System begins. A transrectal ultrasound probe (TRUS) is inserted into the rectum and the space between the prostate (mid gland) and rectum is measured. Under TRUS guidance, a saline syringe needle (15 cm 18G needle) is inserted through the perineal skin, rectourethralis muscle and past the prostate apex to the perirectal fat between Denonvilliers’ fascia at prostate mid-gland and the rectal wall.  This is a very asymmetric gland due to the patient's right mid prostate nodule.  There is clearly extra capsular extension with  this mass.  The needle position is confirmed in both sagittal and axial ultrasound fields, and saline is injected to dissect the space between the Denonvilliers’ fascia and anterior rectal wall (“hydrodissection”).  Hydrodissection under ultrasound guidance confirms proper needle location and creates space for hydrogel injection.With the needle tip at mid gland, the axial field is viewed to confirm the needle is not in the rectal wall and is centered. Confirmation of proper placement in the perirectal fat is noted.      While maintaining the desired position, aspiration is performed to ensure that the needle is not in an intravascular space.  The saline syringe is then removed and the SpaceOAR System syringe assembly is then attached to the same 18G needle. Under ultrasound guidance (sagittal plane), a smooth, continuous injection technique is used to dispense the hydrogel implant into the space between the prostate and rectum.  The entire syringe contents (10 mL total) are injected without stopping, resulting in expansion of the yandy-prostatic space with the liquid precursors that solidify within 10 seconds.  Optimal visualization of the needle during hydrogel administration is maintained at all times.      Following injection, the needle is removed, and the spent applicator and needle are discarded. An axial measurement of the space between the prostate (mid-gland) and rectum immediately post- injection is noted. The TRUS probe is removed from the patient, the stirrups are lowered, and patient is wheeled from the room for recovery.    Estimated Blood Loss: Less than 30 mL    Specimens:                None      Drains: none    Complications: none    Plan: Patient will begin radiation therapy per radiation oncologist plan.      (Please note that portions of this note were completed with a voice recognition program.)  Michi Greer MD     Date: 7/8/2022  Time: 09:12 CDT  00

## 2022-07-08 NOTE — ANESTHESIA PROCEDURE NOTES
Airway  Urgency: elective    Date/Time: 7/8/2022 8:40 AM  Airway not difficult    General Information and Staff    Patient location during procedure: OR  CRNA/CAA: Houston Marquez CRNA    Indications and Patient Condition  Indications for airway management: airway protection    Preoxygenated: yes  Mask difficulty assessment: 2 - vent by mask + OA or adjuvant +/- NMBA    Final Airway Details  Final airway type: endotracheal airway      Successful airway: ETT  Cuffed: yes   Successful intubation technique: direct laryngoscopy  Facilitating devices/methods: intubating stylet  Endotracheal tube insertion site: oral  Blade: Merlene  Blade size: 3.5  ETT size (mm): 7.5  Cormack-Lehane Classification: grade IIa - partial view of glottis  Placement verified by: capnometry   Cuff volume (mL): 8  Measured from: lips  ETT/EBT  to lips (cm): 22  Number of attempts at approach: 1  Assessment: lips, teeth, and gum same as pre-op and atraumatic intubation

## 2022-07-08 NOTE — ANESTHESIA PREPROCEDURE EVALUATION
Anesthesia Evaluation     Patient summary reviewed   NPO Solid Status: > 6 hours  NPO Liquid Status: > 4 hours           Airway   Mallampati: II  Dental      Pulmonary    (+) a smoker Former,   Cardiovascular   Exercise tolerance: good (4-7 METS)    (+) hypertension, hyperlipidemia,   (-) pacemaker, valvular problems/murmurs, past MI, CABG      Neuro/Psych  (-) seizures, CVA  GI/Hepatic/Renal/Endo    (-) no renal disease, diabetes    Musculoskeletal     Abdominal    Substance History      OB/GYN          Other                          Anesthesia Plan    ASA 2     general     intravenous induction     Anesthetic plan, risks, benefits, and alternatives have been provided, discussed and informed consent has been obtained with: patient.        CODE STATUS:

## 2022-07-13 ENCOUNTER — APPOINTMENT (OUTPATIENT)
Dept: RADIATION ONCOLOGY | Facility: HOSPITAL | Age: 67
End: 2022-07-13

## 2022-07-15 ENCOUNTER — APPOINTMENT (OUTPATIENT)
Dept: RADIATION ONCOLOGY | Facility: HOSPITAL | Age: 67
End: 2022-07-15

## 2022-07-15 ENCOUNTER — INFUSION (OUTPATIENT)
Dept: ONCOLOGY | Facility: HOSPITAL | Age: 67
End: 2022-07-15

## 2022-07-15 VITALS
SYSTOLIC BLOOD PRESSURE: 147 MMHG | DIASTOLIC BLOOD PRESSURE: 82 MMHG | HEIGHT: 66 IN | TEMPERATURE: 97.8 F | WEIGHT: 217 LBS | OXYGEN SATURATION: 100 % | BODY MASS INDEX: 34.87 KG/M2 | RESPIRATION RATE: 20 BRPM | HEART RATE: 72 BPM

## 2022-07-15 DIAGNOSIS — C61 PROSTATE CANCER: Primary | ICD-10-CM

## 2022-07-15 PROCEDURE — 25010000002 DEGARELIX 80 MG RECONSTITUTED SOLUTION: Performed by: UROLOGY

## 2022-07-15 PROCEDURE — 96402 CHEMO HORMON ANTINEOPL SQ/IM: CPT

## 2022-07-15 RX ADMIN — DEGARELIX 80 MG: KIT at 14:29

## 2022-07-18 ENCOUNTER — HOSPITAL ENCOUNTER (OUTPATIENT)
Dept: RADIATION ONCOLOGY | Facility: HOSPITAL | Age: 67
Setting detail: RADIATION/ONCOLOGY SERIES
Discharge: HOME | End: 2022-07-18

## 2022-07-18 PROCEDURE — 77334 RADIATION TREATMENT AID(S): CPT | Performed by: RADIOLOGY

## 2022-07-18 NOTE — TELEPHONE ENCOUNTER
Zuleima  called to request a refill on his medication. Last office visit : 5/3/2022   Next office visit : 8/3/2022     Last UDS:   Amphetamine Screen, Urine   Date Value Ref Range Status   07/29/2021 NEG  Final     Barbiturate Screen, Urine   Date Value Ref Range Status   07/29/2021 NEG  Final     Benzodiazepine Screen, Urine   Date Value Ref Range Status   07/29/2021 NEG  Final     Buprenorphine Urine   Date Value Ref Range Status   07/29/2021 NEG  Final     Cocaine Metabolite Screen, Urine   Date Value Ref Range Status   07/29/2021 NEG  Final     Gabapentin Screen, Urine   Date Value Ref Range Status   07/29/2021 NEG  Final     MDMA, Urine   Date Value Ref Range Status   07/29/2021 NEG  Final     Methamphetamine, Urine   Date Value Ref Range Status   07/29/2021 NEG  Final     Opiate Scrn, Ur   Date Value Ref Range Status   07/29/2021 NEG  Final     Oxycodone Screen, Ur   Date Value Ref Range Status   07/29/2021 NEG  Final     PCP Screen, Urine   Date Value Ref Range Status   07/29/2021 NEG  Final     Propoxyphene Screen, Urine   Date Value Ref Range Status   07/29/2021 NEG  Final     THC Screen, Urine   Date Value Ref Range Status   07/29/2021 NEG  Final     Tricyclic Antidepressants, Urine   Date Value Ref Range Status   07/29/2021 NEG  Final       Last Kasper7/18/22  Medication Contract:7/29/21    Requested Prescriptions     Pending Prescriptions Disp Refills    amLODIPine (NORVASC) 10 MG tablet 90 tablet 1     Sig: Take 1 tablet by mouth in the morning. meclizine (ANTIVERT) 25 MG tablet 270 tablet 1     Sig: Take 1 tablet by mouth 3 times daily as needed for Dizziness    potassium chloride (KLOR-CON M) 20 MEQ extended release tablet 60 tablet 1     Sig: Take 1 tablet by mouth every other day    LORazepam (ATIVAN) 1 MG tablet 180 tablet 0     Sig: Take 1 tablet by mouth in the morning and 1 tablet before bedtime. Do all this for 90 days.     furosemide (LASIX) 20 MG tablet 90 tablet 1     Sig: Take 1 tablet by mouth in the morning. simvastatin (ZOCOR) 40 MG tablet 90 tablet 1     Sig: Take 0.5 tablets by mouth nightly    irbesartan (AVAPRO) 300 MG tablet 90 tablet 1     Sig: Take 1 tablet by mouth nightly         Please approve or refuse this medication.    Derek Ramos MA

## 2022-08-01 ENCOUNTER — HOSPITAL ENCOUNTER (OUTPATIENT)
Dept: RADIATION ONCOLOGY | Facility: HOSPITAL | Age: 67
Setting detail: RADIATION/ONCOLOGY SERIES
End: 2022-08-01

## 2022-08-03 PROBLEM — R97.20 ELEVATED PSA: Status: RESOLVED | Noted: 2022-01-01 | Resolved: 2022-01-01

## 2022-08-03 PROBLEM — C61 PROSTATE CANCER (HCC): Status: ACTIVE | Noted: 2022-01-01

## 2022-08-03 NOTE — PROGRESS NOTES
Formerly [facility] PHYSICIAN SERVICES  [facility] INTERNAL MEDICINE  [address] [address]  [address] [address]  Dept: [phone]  Dept Fax: [phone]: [phone]    Madhav Chu (:  1955) is a 78 y.o. male,Established patient  with green, here for evaluation of the following chief complaint(s): Medicare AWV      Madhav Chu is a 78 y.o. male who presents today for his medical conditions/complaints as noted below. Madhav Chu is c/kait Medicare AWV        HPI:     Chief Complaint   Patient presents with    Medicare AWV       HPI   Prostate cancer;  he is under treatment  with Dr Hilario Etienne;  with radiation ' he has NM scan that showed no mets;    Meds were firmagon , and lupron    He was having a lot of problems with pain;  after tther injections but his meds are controlling  he has pain about 2-3 days after then ok til the next injection     2. Hypertension is on amlodipine 10, he has as needed clonidine and Lasix and irbesartan 300 mg at bedtime  3. Neuropathy currently not taking medications for this has worsened somewhat with his radiation and chemo  4. Memory loss he is going to try Prevagen  5. Hyperlipidemia stable with Zocor 40 daily  6. Chronic pain syndrome; his hydrocodone 5 every 8 hours is on hydrocodone 5 every 8 hours as needed  7. Anxiety stable with lorazepam 1 mg twice daily  8. Depression he is on Wellbutrin 75 daily    Controlled Substance Monitoring:    Acute and Chronic Pain Monitoring:   RX Monitoring 8/3/2022   Attestation -   Periodic Controlled Substance Monitoring Possible medication side effects, risk of tolerance/dependence & alternative treatments discussed. ;No signs of potential drug abuse or diversion identified. Chronic Pain > 50 MEDD Re-evaluated the status of the patient's underlying condition causing pain.;Obtained or confirmed \"Consent for Opioid Use\" on file.        Controlled Substance Monitoring:    CONTROLLED SUBSTANCE  Drug codon, Ativan  Diagnosis chronic arthritis pain, anxiety  Last Maria Dolores Boop 7/20/2022  Last UDS 8/3/2022  Pain contract last date 8/3/2020  Effective dose   yes      Changes this visit   none           Past Medical History:   Diagnosis Date    Anxiety and depression     Atypical chest pain     Elevated lipids     Gastritis     GERD (gastroesophageal reflux disease)     Hepatitis A     Hiatal hernia     History of kidney stones     HTN (hypertension)     Hyperlipidemia     Hypertension     Irregular Z line of esophagus     Libido, decreased     Migraines     Prostate cancer ([address] 8/3/2022      Past Surgical History:   Procedure Laterality Date    CHOLECYSTECTOMY      COLONOSCOPY  11-    [facility]    COLONOSCOPY      COLONOSCOPY  10-3-06    Dr Yari Tang    COLONOSCOPY  45-23-38    Dr Abraham Hancock N/A 8/9/2021    Cynthia Puckett performed by Dakota Penn DO at [address] ARTHROSCOPY Bilateral     KNEE SURGERY Bilateral     2 X'S ON LEFT ONCE ON THE RIGHT    UPPER GASTROINTESTINAL ENDOSCOPY  11-     [facility]    UPPER GASTROINTESTINAL ENDOSCOPY      UPPER GASTROINTESTINAL ENDOSCOPY  8-30-01    Dr Brenden Parker  10-10-06    Dr Brenden Parker  11-18-10    Dr Austin Cope 8/3/2022 6/28/2022 5/3/2022 2/2/2022 11/2/2021 6/28/7174   SYSTOLIC 951 [phone]   DIASTOLIC 70 76 78 72 68 74   Site - - - - - Right Upper Arm   Position - - - - - Sitting   Cuff Size - - - - - Large Adult   Pulse 76 81 68 79 80 -   Temp - - - - 98.1 97.3   Resp - - - - - -   SpO2 94 98 98 98 99 -   Weight 215 lb 214 lb 1.6 oz 215 lb 218 lb 11.2 oz 210 lb 213 lb   Height 5' 6\" 5' 6\" 5' 6\" 5' 6\" 5' 6\" 5' 6\"   Body mass index 34.7 kg/m2 34.55 kg/m2 34.7 kg/m2 35.3 kg/m2 33.89 kg/m2 34.38 kg/m2   Some recent data might be hidden       Family History   Problem Relation Age of Onset    Heart Disease Father     Heart Disease Sister     Heart Disease Brother     Stomach Cancer Mother     Cancer Sister         hodgkins    Cancer Mother        Social History     Tobacco Use    Smoking status: Never    Smokeless tobacco: Never   Substance Use Topics    Alcohol use: Yes     Comment: occ. Current Outpatient Medications   Medication Sig Dispense Refill    HYDROcodone-acetaminophen (NORCO) 5-325 MG per tablet Take 1 tablet by mouth every 8 hours as needed. amLODIPine (NORVASC) 10 MG tablet Take 1 tablet by mouth in the morning. 90 tablet 1    meclizine (ANTIVERT) 25 MG tablet Take 1 tablet by mouth 3 times daily as needed for Dizziness 270 tablet 1    potassium chloride (KLOR-CON M) 20 MEQ extended release tablet Take 1 tablet by mouth every other day 60 tablet 1    LORazepam (ATIVAN) 1 MG tablet Take 1 tablet by mouth in the morning and 1 tablet before bedtime. Do all this for 90 days. 180 tablet 0    furosemide (LASIX) 20 MG tablet Take 1 tablet by mouth in the morning. 90 tablet 1    simvastatin (ZOCOR) 40 MG tablet Take 0.5 tablets by mouth nightly 90 tablet 1    irbesartan (AVAPRO) 300 MG tablet Take 1 tablet by mouth nightly 90 tablet 1    buPROPion (WELLBUTRIN) 75 MG tablet Take 75 mg by mouth daily as needed      cloNIDine (CATAPRES) 0.1 MG tablet Take 1 tablet by mouth as needed for High Blood Pressure 180 tablet 3     No current facility-administered medications for this visit. Allergies   Allergen Reactions    Nitroglycerin Other (See Comments)     Blood pressure problems, very bad, happened in [facility]. Blood pressure problems, very bad, happened in [facility]. Blood pressure problems, very bad, happened in [facility].        Health Maintenance   Topic Date Due    Pneumococcal 65+ years Vaccine (1 - PCV) Never done    Diabetes screen  05/03/2023 (Originally [birth year]/1991)    Shingles vaccine (1 of 2) 08/03/2023 (Originally 6/5/2006)    COVID-19 Vaccine (3 - Booster for Moderna series) 07/29/2024 (Originally 8/16/2021)    Flu vaccine (1) 09/01/2022    Lipids  04/27/2023    Prostate Specific Antigen (PSA) Screening or Monitoring  06/17/2023    Depression Monitoring  08/03/2023    Annual Wellness Visit (AWV)  08/04/2023    DTaP/Tdap/Td vaccine (2 - Td or Tdap) 10/14/2029    Colorectal Cancer Screen  09/07/2031    Hepatitis C screen  Completed    Hepatitis A vaccine  Aged Out    Hepatitis B vaccine  Aged Out    Hib vaccine  Aged Out    Meningococcal (ACWY) vaccine  Aged Out       No results found for: [employer]  Lab Results   Component Value Date    PSA 52.30 (H) 04/27/2022    PSA 3.50 08/19/2020    PSA 1.71 01/14/2019     TSH   Date Value Ref Range Status   07/28/2021 1.430 0.270 - 4.200 uIU/mL Final   ]  Lab Results   Component Value Date     04/27/2022    K 4.0 04/27/2022     04/27/2022    CO2 29 04/27/2022    BUN 11 04/27/2022    CREATININE 0.9 04/27/2022    GLUCOSE 111 (H) 04/27/2022    CALCIUM 9.6 04/27/2022    PROT 7.1 04/27/2022    LABALBU 4.7 04/27/2022    BILITOT 0.6 04/27/2022    ALKPHOS 100 04/27/2022    AST 21 04/27/2022    ALT 27 04/27/2022    LABGLOM >60 04/27/2022    GFRAA >59 04/27/2022     Lab Results   Component Value Date    CHOL 214 (H) 04/27/2022    CHOL 187 07/28/2021    CHOL 132 (L) 08/19/2020     Lab Results   Component Value Date    TRIG 156 (H) 04/27/2022    TRIG 117 07/29/2021    TRIG 340 (H) 07/28/2021     Lab Results   Component Value Date    HDL 67 04/27/2022    HDL 72 07/28/2021    HDL 40 (L) 08/19/2020     Lab Results   Component Value Date    LDLCALC 116 04/27/2022    LDLCALC 47 07/28/2021    LDLCALC 63 08/19/2020     Lab Results   Component Value Date/Time     04/27/2022 08:47 AM     04/08/2011 11:50 AM    K 4.0 04/27/2022 08:47 AM    K 4.2 04/08/2011 11:50 AM     04/27/2022 08:47 AM     04/08/2011 11:50 AM    CO2 29 04/27/2022 08:47 AM    BUN 11 04/27/2022 08:47 AM    CREATININE 0.9 04/27/2022 08:47 AM    CREATININE 1.0 04/08/2011 11:50 AM    GLUCOSE 111 04/27/2022 08:47 AM    CALCIUM 9.6 04/27/2022 08:47 AM      Lab Results   Component Value Date    WBC 6.58 06/28/2022    HGB 16.3 06/28/2022    HCT 49.5 06/28/2022    MCV 99.4 (H) 06/28/2022     06/28/2022    LABLYMP 2.15 02/11/2015    LYMPHOPCT 33.3 06/28/2022    RBC 4.98 06/28/2022    MCH 32.7 (H) 06/28/2022    MCHC 32.9 06/28/2022    RDW 12.6 06/28/2022     Lab Results   Component Value Date    VITD25 83.1 07/28/2021     Labs reviewed from 6/28/22  Diagnostics reviewed from [facility] nuclear med scan  Subjective:      Review of Systems   Constitutional:  Negative for fatigue, fever and unexpected weight change. HENT:  Negative for ear discharge, ear pain, mouth sores, sore throat and trouble swallowing. Eyes:  Negative for discharge, itching and visual disturbance. Respiratory:  Negative for cough, choking, shortness of breath, wheezing and stridor. Cardiovascular:  Negative for chest pain, palpitations and leg swelling. Gastrointestinal:  Negative for abdominal distention, abdominal pain, blood in stool, constipation, diarrhea, nausea and vomiting. Endocrine: Negative for cold intolerance, polydipsia and polyuria. Genitourinary:  Negative for difficulty urinating, dysuria, frequency and urgency. Musculoskeletal:  Positive for back pain. Negative for arthralgias and gait problem. Skin:  Negative for color change and rash. Allergic/Immunologic: Negative for food allergies and immunocompromised state. Neurological:  Negative for dizziness, tremors, syncope, speech difficulty, weakness and headaches. Peripheral neuropathy   Hematological:  Negative for adenopathy. Does not bruise/bleed easily. Psychiatric/Behavioral:  Negative for confusion and hallucinations. Memory loss     Objective:     Physical Exam  Constitutional:       General: He is not in acute distress. Appearance: He is well-developed. HENT:      Head: Normocephalic and atraumatic. Eyes:      General: No scleral icterus. Right eye: No discharge. Left eye: No discharge. Pupils: Pupils are equal, round, and reactive to light. Neck:      Thyroid: No thyromegaly. Vascular: No JVD. Cardiovascular:      Rate and Rhythm: Normal rate and regular rhythm. Heart sounds: Normal heart sounds. No murmur heard. Pulmonary:      Effort: Pulmonary effort is normal. No respiratory distress. Breath sounds: Normal breath sounds. No wheezing or rales. Abdominal:      General: Bowel sounds are normal. There is no distension. Palpations: Abdomen is soft. There is no mass. Tenderness: There is no abdominal tenderness. There is no guarding or rebound. Musculoskeletal:         General: No tenderness. Normal range of motion. Cervical back: Normal range of motion and neck supple. Skin:     General: Skin is warm and dry. Findings: No erythema or rash. Neurological:      Mental Status: He is alert and oriented to person, place, and time. Cranial Nerves: No cranial nerve deficit. Coordination: Coordination normal.      Deep Tendon Reflexes: Reflexes are normal and symmetric. Reflexes normal.   Psychiatric:         Mood and Affect: Mood is not depressed. Behavior: Behavior normal.         Thought Content:  Thought content normal.         Judgment: Judgment normal.     /70   Pulse 76   Ht 5' 6\" (1.676 m)   Wt 215 lb (97.5 kg)   SpO2 94%   BMI 34.70 kg/m²           Assessment:      Problem List       Prostate cancer [facility])     Is undergone surgery previously started at [facility] Dr. Hortencia Campbell with medications and radiation          Relevant Medications    LORazepam (ATIVAN) 1 MG tablet    HYDROcodone-acetaminophen (NORCO) 5-325 MG per tablet    Recurrent major depressive disorder, in partial remission ([facility] [address]     Stable with Wellbutrin 75 mg daily Relevant Medications    buPROPion (WELLBUTRIN) 75 MG tablet    LORazepam (ATIVAN) 1 MG tablet    Anxiety     He is stable with lorazepam 1 mg twice daily          Relevant Medications    buPROPion (WELLBUTRIN) 75 MG tablet    LORazepam (ATIVAN) 1 MG tablet    Benign essential HTN     He is stable with amlodipine 10 in the morning he takes Lasix every day and irbesartan 300 and he has. Clonidine          Relevant Orders    Comprehensive Metabolic Panel    CBC with Auto Differential    TSH    Urinalysis with Reflex to Culture    Chronic pain syndrome     Stable with hydrocodone 5 3 times a day as needed          Relevant Medications    buPROPion (WELLBUTRIN) 75 MG tablet    HYDROcodone-acetaminophen (NORCO) 5-325 MG per tablet    Memory loss     He was trying Prevagen for memory loss          Mixed hyperlipidemia     He is stable with Zocor 40 daily          Relevant Medications    cloNIDine (CATAPRES) 0.1 MG tablet    amLODIPine (NORVASC) 10 MG tablet    furosemide (LASIX) 20 MG tablet    simvastatin (ZOCOR) 40 MG tablet    irbesartan (AVAPRO) 300 MG tablet    Other Relevant Orders    Lipid Panel    Peripheral polyneuropathy     Currently is not on any medicine per se for neuropathy          Relevant Medications    buPROPion (WELLBUTRIN) 75 MG tablet    LORazepam (ATIVAN) 1 MG tablet       Plan:        Patient given educational materials - see patient instructions. Discussed use, benefit, and side effects of prescribed medications. Allpatient questions answered. Pt voiced understanding. Reviewed health maintenance. Instructed to continue current medications, diet and exercise. Patient agreed with treatment plan. Follow up as directed. MEDICATIONS:  No orders of the defined types were placed in this encounter.         ORDERS:  Orders Placed This Encounter   Procedures    Comprehensive Metabolic Panel    CBC with Auto Differential    Lipid Panel    Vitamin D 25 Hydroxy    TSH    Urinalysis with Reflex to Culture Follow-up:  Return in 3 months (on 11/3/2022) for Medicare Annual Wellness Visit in 1 year. PATIENT INSTRUCTIONS:  #1 prostate cancer continue current therapy for now with continued healthy  stable on current meds no changes  3 peripheral neuropathy  4.  memory loss  5.  hyperlipidemia stable with current meds no changes  6. chronic pain syndrome stable with hydrocodone no changes  7. anxiety stable with lorazepam no changes  8.  recurrent major depressive disorder stable with current dose of  Patient Instructions   Personalized Preventive Plan for Kiarra Saliva - 8/3/2022  Medicare offers a range of preventive health benefits. Some of the tests and screenings are paid in full while other may be subject to a deductible, co-insurance, and/or copay. Some of these benefits include a comprehensive review of your medical history including lifestyle, illnesses that may run in your family, and various assessments and screenings as appropriate. After reviewing your medical record and screening and assessments performed today your provider may have ordered immunizations, labs, imaging, and/or referrals for you. A list of these orders (if applicable) as well as your Preventive Care list are included within your After Visit Summary for your review. Other Preventive Recommendations:    A preventive eye exam performed by an eye specialist is recommended every 1-2 years to screen for glaucoma; cataracts, macular degeneration, and other eye disorders. A preventive dental visit is recommended every 6 months. Try to get at least 150 minutes of exercise per week or 10,000 steps per day on a pedometer . Order or download the FREE \"Exercise & Physical Activity: Your Everyday Guide\" from [employer]. Call 9-449.428.9795 or search [employer] online. You need 9089-0599 mg of calcium and 6899-4599 IU of vitamin D per day.  It is possible to meet your calcium requirement with diet alone, but a vitamin D supplement is usually necessary to meet this goal.  When exposed to the sun, use a sunscreen that protects against both UVA and UVB radiation with an SPF of 30 or greater. Reapply every 2 to 3 hours or after sweating, drying off with a towel, or swimming. Always wear a seat belt when traveling in a car. Always wear a helmet when riding a bicycle or motorcycle. Electronically signed by EMILIA Sutherland on 8/3/2022 at 9:04 AM    @    [place]alis/transcription disclaimer:  Much of this encounter note is electronic transcription/translation of spoken language to printed texts. The electronic translation of spoken language may be erroneous, or at times,nonsensical words or phrases may be inadvertently transcribed. Although I have reviewed the note for such errors, some may still exist.  Medicare Annual Wellness Visit    Yg Rodriguez is here for Medicare AWV    Assessment & Plan   Vitamin D deficiency  -     Vitamin D 25 Hydroxy; Future  Prostate cancer [facility])  Assessment & Plan:  Is undergone surgery previously started at [facility] Dr. Shad Calvillo with medications and radiation   Benign essential HTN  Assessment & Plan:  He is stable with amlodipine 10 in the morning he takes Lasix every day and irbesartan 300 and he has. Clonidine   Orders:  -     Comprehensive Metabolic Panel; Future  -     CBC with Auto Differential; Future  -     TSH; Future  -     Urinalysis with Reflex to Culture; Future  Peripheral polyneuropathy  Assessment & Plan:  Currently is not on any medicine per se for neuropathy   Memory loss  Assessment & Plan:  He was trying Prevagen for memory loss   Mixed hyperlipidemia  Assessment & Plan:  He is stable with Zocor 40 daily   Orders:  -     Lipid Panel;  Future  Chronic pain syndrome  Assessment & Plan:  Stable with hydrocodone 5 3 times a day as needed   Anxiety  Assessment & Plan:  He is stable with lorazepam 1 mg twice daily   Recurrent major depressive disorder, in partial remission [facility])  Assessment & Plan:  Stable with Wellbutrin 75 mg daily   Medicare annual wellness visit, subsequent    Recommendations for Preventive Services Due: see orders and patient instructions/AVS.  Recommended screening schedule for the next 5-10 years is provided to the patient in written form: see Patient Instructions/AVS.     Return in 3 months (on 11/3/2022) for Medicare Annual Wellness Visit in 1 year. Subjective       Patient's complete Health Risk Assessment and screening values have been reviewed and are found in Flowsheets. The following problems were reviewed today and where indicated follow up appointments were made and/or referrals ordered. Positive Risk Factor Screenings with Interventions:             Opioid Risk: (Low risk score <55) Opioid risk score: 10    Patient is low risk for opioid use disorder or overdose. Last PDMP Michael Higginbotham as Reviewed:  Review User Review Instant Review Result   ZENIA POOL 5/10/2022 12:08 PM Reviewed PDMP [1]     Last Controlled Substance Monitoring Documentation      [facility] Office Visit from 8/3/2022 in [address] [facility] Internal Medicine   Periodic Controlled Substance Monitoring Possible medication side effects, risk of tolerance/dependence & alternative treatments discussed., No signs of potential drug abuse or diversion identified. filed at 08/03/2022 [id number]   Chronic Pain > 50 MEDD Re-evaluated the status of the patient's underlying condition causing pain., Obtained or confirmed \"Consent for Opioid Use\" on file.  filed at 08/03/2022 [place] and ACP:  General  In general, how would you say your health is?: Good  In the past 7 days, have you experienced any of the following: New or Increased Pain, New or Increased Fatigue, Loneliness, Social Isolation, Stress or Anger?: No  Do you get the social and emotional support that you need?: Yes  Do you have a Living Will?: Yes    Advance Directives       Power of [facility] Will ACP-Advance Directive ACP-Power of     Not on File Not on File Not on File Not on File        General Health Risk Interventions:  na    Health Habits/Nutrition:  Physical Activity: Sufficiently Active    Days of Exercise per Week: 7 days    Minutes of Exercise per Session: 60 min     Have you lost any weight without trying in the past 3 months?: No  Body mass index: (!) 34.7  Have you seen the dentist within the past year?: Yes  Health Habits/Nutrition Interventions:  Inadequate physical activity:  patient is not ready to increase his/her physical activity level at this time             Objective   Vitals:    08/03/22 0840   BP: 112/70   Pulse: 76   SpO2: 94%   Weight: 215 lb (97.5 kg)   Height: 5' 6\" (1.676 m)      Body mass index is 34.7 kg/m². Allergies   Allergen Reactions    Nitroglycerin Other (See Comments)     Blood pressure problems, very bad, happened in [place] [facility]. Blood pressure problems, very bad, happened in [facility]. Blood pressure problems, very bad, happened in [facility]. Prior to Visit Medications    Medication Sig Taking? Authorizing Provider   HYDROcodone-acetaminophen (NORCO) 5-325 MG per tablet Take 1 tablet by mouth every 8 hours as needed. Yes Historical Provider, MD   amLODIPine (NORVASC) 10 MG tablet Take 1 tablet by mouth in the morning. EMILIA Hahn   meclizine (ANTIVERT) 25 MG tablet Take 1 tablet by mouth 3 times daily as needed for Dizziness  EMILIA Hahn   potassium chloride (KLOR-CON M) 20 MEQ extended release tablet Take 1 tablet by mouth every other day  EMILIA Hahn   LORazepam (ATIVAN) 1 MG tablet Take 1 tablet by mouth in the morning and 1 tablet before bedtime. Do all this for 90 days. EMILIA Hahn   furosemide (LASIX) 20 MG tablet Take 1 tablet by mouth in the morning.   EMILIA Hahn   simvastatin (ZOCOR) 40 MG tablet Take 0.5 tablets by mouth nightly  EMILIA Hahn   irbesartan (AVAPRO) 300 MG tablet Take 1 tablet by mouth nightly  EMILIA Chu   buPROPion (WELLBUTRIN) 75 MG tablet Take 75 mg by mouth daily as needed  Historical Provider, MD   cloNIDine (CATAPRES) 0.1 MG tablet Take 1 tablet by mouth as needed for High Blood Pressure  EMILIA Chu       CareTeam (Including outside providers/suppliers regularly involved in providing care):   Patient Care Team:  EMILIA Chu as PCP - General (Nurse Practitioner Acute Care)  EMILIA Chu as PCP - [facility] Empaneled Provider     Reviewed and updated this visit:  Tobacco  Allergies  Meds  Med Hx  Surg Hx  Soc Hx  Fam Hx

## 2022-08-03 NOTE — PATIENT INSTRUCTIONS
Personalized Preventive Plan for Jen Zepeda - 8/3/2022  Medicare offers a range of preventive health benefits. Some of the tests and screenings are paid in full while other may be subject to a deductible, co-insurance, and/or copay. Some of these benefits include a comprehensive review of your medical history including lifestyle, illnesses that may run in your family, and various assessments and screenings as appropriate. After reviewing your medical record and screening and assessments performed today your provider may have ordered immunizations, labs, imaging, and/or referrals for you. A list of these orders (if applicable) as well as your Preventive Care list are included within your After Visit Summary for your review. Other Preventive Recommendations:    A preventive eye exam performed by an eye specialist is recommended every 1-2 years to screen for glaucoma; cataracts, macular degeneration, and other eye disorders. A preventive dental visit is recommended every 6 months. Try to get at least 150 minutes of exercise per week or 10,000 steps per day on a pedometer . Order or download the FREE \"Exercise & Physical Activity: Your Everyday Guide\" from [employer]. Call 8-183.317.9501 or search [employer] online. You need 8910-8609 mg of calcium and 6911-4574 IU of vitamin D per day. It is possible to meet your calcium requirement with diet alone, but a vitamin D supplement is usually necessary to meet this goal.  When exposed to the sun, use a sunscreen that protects against both UVA and UVB radiation with an SPF of 30 or greater. Reapply every 2 to 3 hours or after sweating, drying off with a towel, or swimming. Always wear a seat belt when traveling in a car. Always wear a helmet when riding a bicycle or motorcycle.

## 2022-08-03 NOTE — ASSESSMENT & PLAN NOTE
Is undergone surgery previously started at [facility] Dr. Joelle Gonzales with medications and radiation

## 2022-08-03 NOTE — ASSESSMENT & PLAN NOTE
He is stable with amlodipine 10 in the morning he takes Lasix every day and irbesartan 300 and he has.   Clonidine

## 2022-08-11 PROCEDURE — 77300 RADIATION THERAPY DOSE PLAN: CPT | Performed by: RADIOLOGY

## 2022-08-11 PROCEDURE — 77301 RADIOTHERAPY DOSE PLAN IMRT: CPT | Performed by: RADIOLOGY

## 2022-08-11 PROCEDURE — 77338 DESIGN MLC DEVICE FOR IMRT: CPT | Performed by: RADIOLOGY

## 2022-08-12 ENCOUNTER — INFUSION (OUTPATIENT)
Dept: ONCOLOGY | Facility: HOSPITAL | Age: 67
End: 2022-08-12

## 2022-08-12 VITALS
DIASTOLIC BLOOD PRESSURE: 76 MMHG | WEIGHT: 216 LBS | TEMPERATURE: 97.6 F | HEART RATE: 71 BPM | RESPIRATION RATE: 18 BRPM | SYSTOLIC BLOOD PRESSURE: 133 MMHG | OXYGEN SATURATION: 99 % | HEIGHT: 66 IN | BODY MASS INDEX: 34.72 KG/M2

## 2022-08-12 DIAGNOSIS — C61 PROSTATE CANCER: Primary | ICD-10-CM

## 2022-08-12 PROCEDURE — 96402 CHEMO HORMON ANTINEOPL SQ/IM: CPT

## 2022-08-12 PROCEDURE — 25010000002 DEGARELIX 80 MG RECONSTITUTED SOLUTION: Performed by: UROLOGY

## 2022-08-12 RX ADMIN — DEGARELIX 80 MG: KIT at 13:58

## 2022-08-16 ENCOUNTER — HOSPITAL ENCOUNTER (OUTPATIENT)
Dept: RADIATION ONCOLOGY | Facility: HOSPITAL | Age: 67
Setting detail: RADIATION/ONCOLOGY SERIES
Discharge: HOME | End: 2022-08-16

## 2022-08-16 ENCOUNTER — DOCUMENTATION (OUTPATIENT)
Dept: RADIATION ONCOLOGY | Facility: HOSPITAL | Age: 67
End: 2022-08-16

## 2022-08-16 LAB
RAD ONC ARIA COURSE ID: NORMAL
RAD ONC ARIA COURSE LAST TREATMENT DATE: NORMAL
RAD ONC ARIA COURSE START DATE: NORMAL
RAD ONC ARIA COURSE TREATMENT ELAPSED DAYS: 0
RAD ONC ARIA FIRST TREATMENT DATE: NORMAL
RAD ONC ARIA PLAN FRACTIONS TREATED TO DATE: 1
RAD ONC ARIA PLAN ID: NORMAL
RAD ONC ARIA PLAN PRESCRIBED DOSE PER FRACTION: 2.5 GY
RAD ONC ARIA PLAN PRIMARY REFERENCE POINT: NORMAL
RAD ONC ARIA PLAN TOTAL FRACTIONS PRESCRIBED: 16
RAD ONC ARIA PLAN TOTAL PRESCRIBED DOSE: 4000 CGY
RAD ONC ARIA REFERENCE POINT DOSAGE GIVEN TO DATE: 2.5 GY
RAD ONC ARIA REFERENCE POINT ID: NORMAL
RAD ONC ARIA REFERENCE POINT SESSION DOSAGE GIVEN: 2.5 GY

## 2022-08-16 PROCEDURE — 77385: CPT | Performed by: RADIOLOGY

## 2022-08-16 NOTE — PROGRESS NOTES
DYLON met with Mr. Garcia who is starting radiation treatment for prostate cancer. DYLON explained role and source of support. He is 67 years old and lives with his partner. His support groups includes his partner and his sister. He does not have financial or transportation concerns. He does not take any medication for anxiety or depression and he does not see a counselor. No needs noted. DYLON encouraged Mr. Garcia to call if assistance is needed in the future.

## 2022-08-17 ENCOUNTER — HOSPITAL ENCOUNTER (OUTPATIENT)
Dept: RADIATION ONCOLOGY | Facility: HOSPITAL | Age: 67
Setting detail: RADIATION/ONCOLOGY SERIES
Discharge: HOME | End: 2022-08-17

## 2022-08-17 LAB
RAD ONC ARIA COURSE ID: NORMAL
RAD ONC ARIA COURSE LAST TREATMENT DATE: NORMAL
RAD ONC ARIA COURSE START DATE: NORMAL
RAD ONC ARIA COURSE TREATMENT ELAPSED DAYS: 1
RAD ONC ARIA FIRST TREATMENT DATE: NORMAL
RAD ONC ARIA PLAN FRACTIONS TREATED TO DATE: 2
RAD ONC ARIA PLAN ID: NORMAL
RAD ONC ARIA PLAN PRESCRIBED DOSE PER FRACTION: 2.5 GY
RAD ONC ARIA PLAN PRIMARY REFERENCE POINT: NORMAL
RAD ONC ARIA PLAN TOTAL FRACTIONS PRESCRIBED: 16
RAD ONC ARIA PLAN TOTAL PRESCRIBED DOSE: 4000 CGY
RAD ONC ARIA REFERENCE POINT DOSAGE GIVEN TO DATE: 5 GY
RAD ONC ARIA REFERENCE POINT ID: NORMAL
RAD ONC ARIA REFERENCE POINT SESSION DOSAGE GIVEN: 2.5 GY

## 2022-08-17 PROCEDURE — 77385: CPT | Performed by: RADIOLOGY

## 2022-08-18 ENCOUNTER — HOSPITAL ENCOUNTER (OUTPATIENT)
Dept: RADIATION ONCOLOGY | Facility: HOSPITAL | Age: 67
Setting detail: RADIATION/ONCOLOGY SERIES
Discharge: HOME | End: 2022-08-18

## 2022-08-18 LAB
RAD ONC ARIA COURSE ID: NORMAL
RAD ONC ARIA COURSE LAST TREATMENT DATE: NORMAL
RAD ONC ARIA COURSE START DATE: NORMAL
RAD ONC ARIA COURSE TREATMENT ELAPSED DAYS: 2
RAD ONC ARIA FIRST TREATMENT DATE: NORMAL
RAD ONC ARIA PLAN FRACTIONS TREATED TO DATE: 3
RAD ONC ARIA PLAN ID: NORMAL
RAD ONC ARIA PLAN PRESCRIBED DOSE PER FRACTION: 2.5 GY
RAD ONC ARIA PLAN PRIMARY REFERENCE POINT: NORMAL
RAD ONC ARIA PLAN TOTAL FRACTIONS PRESCRIBED: 16
RAD ONC ARIA PLAN TOTAL PRESCRIBED DOSE: 4000 CGY
RAD ONC ARIA REFERENCE POINT DOSAGE GIVEN TO DATE: 7.5 GY
RAD ONC ARIA REFERENCE POINT ID: NORMAL
RAD ONC ARIA REFERENCE POINT SESSION DOSAGE GIVEN: 2.5 GY

## 2022-08-18 PROCEDURE — 77336 RADIATION PHYSICS CONSULT: CPT | Performed by: RADIOLOGY

## 2022-08-18 PROCEDURE — 77385: CPT | Performed by: RADIOLOGY

## 2022-08-19 ENCOUNTER — HOSPITAL ENCOUNTER (OUTPATIENT)
Dept: RADIATION ONCOLOGY | Facility: HOSPITAL | Age: 67
Setting detail: RADIATION/ONCOLOGY SERIES
Discharge: HOME | End: 2022-08-19

## 2022-08-19 LAB
RAD ONC ARIA COURSE ID: NORMAL
RAD ONC ARIA COURSE LAST TREATMENT DATE: NORMAL
RAD ONC ARIA COURSE START DATE: NORMAL
RAD ONC ARIA COURSE TREATMENT ELAPSED DAYS: 3
RAD ONC ARIA FIRST TREATMENT DATE: NORMAL
RAD ONC ARIA PLAN FRACTIONS TREATED TO DATE: 4
RAD ONC ARIA PLAN ID: NORMAL
RAD ONC ARIA PLAN PRESCRIBED DOSE PER FRACTION: 2.5 GY
RAD ONC ARIA PLAN PRIMARY REFERENCE POINT: NORMAL
RAD ONC ARIA PLAN TOTAL FRACTIONS PRESCRIBED: 16
RAD ONC ARIA PLAN TOTAL PRESCRIBED DOSE: 4000 CGY
RAD ONC ARIA REFERENCE POINT DOSAGE GIVEN TO DATE: 10 GY
RAD ONC ARIA REFERENCE POINT ID: NORMAL
RAD ONC ARIA REFERENCE POINT SESSION DOSAGE GIVEN: 2.5 GY

## 2022-08-19 PROCEDURE — 77385: CPT | Performed by: RADIOLOGY

## 2022-08-22 ENCOUNTER — HOSPITAL ENCOUNTER (OUTPATIENT)
Dept: RADIATION ONCOLOGY | Facility: HOSPITAL | Age: 67
Setting detail: RADIATION/ONCOLOGY SERIES
Discharge: HOME | End: 2022-08-22

## 2022-08-22 LAB
RAD ONC ARIA COURSE ID: NORMAL
RAD ONC ARIA COURSE LAST TREATMENT DATE: NORMAL
RAD ONC ARIA COURSE START DATE: NORMAL
RAD ONC ARIA COURSE TREATMENT ELAPSED DAYS: 6
RAD ONC ARIA FIRST TREATMENT DATE: NORMAL
RAD ONC ARIA PLAN FRACTIONS TREATED TO DATE: 5
RAD ONC ARIA PLAN ID: NORMAL
RAD ONC ARIA PLAN PRESCRIBED DOSE PER FRACTION: 2.5 GY
RAD ONC ARIA PLAN PRIMARY REFERENCE POINT: NORMAL
RAD ONC ARIA PLAN TOTAL FRACTIONS PRESCRIBED: 16
RAD ONC ARIA PLAN TOTAL PRESCRIBED DOSE: 4000 CGY
RAD ONC ARIA REFERENCE POINT DOSAGE GIVEN TO DATE: 12.5 GY
RAD ONC ARIA REFERENCE POINT ID: NORMAL
RAD ONC ARIA REFERENCE POINT SESSION DOSAGE GIVEN: 2.5 GY

## 2022-08-22 PROCEDURE — 77385: CPT | Performed by: RADIOLOGY

## 2022-08-23 ENCOUNTER — HOSPITAL ENCOUNTER (OUTPATIENT)
Dept: RADIATION ONCOLOGY | Facility: HOSPITAL | Age: 67
Setting detail: RADIATION/ONCOLOGY SERIES
Discharge: HOME | End: 2022-08-23

## 2022-08-23 LAB
RAD ONC ARIA COURSE ID: NORMAL
RAD ONC ARIA COURSE LAST TREATMENT DATE: NORMAL
RAD ONC ARIA COURSE START DATE: NORMAL
RAD ONC ARIA COURSE TREATMENT ELAPSED DAYS: 7
RAD ONC ARIA FIRST TREATMENT DATE: NORMAL
RAD ONC ARIA PLAN FRACTIONS TREATED TO DATE: 6
RAD ONC ARIA PLAN ID: NORMAL
RAD ONC ARIA PLAN PRESCRIBED DOSE PER FRACTION: 2.5 GY
RAD ONC ARIA PLAN PRIMARY REFERENCE POINT: NORMAL
RAD ONC ARIA PLAN TOTAL FRACTIONS PRESCRIBED: 16
RAD ONC ARIA PLAN TOTAL PRESCRIBED DOSE: 4000 CGY
RAD ONC ARIA REFERENCE POINT DOSAGE GIVEN TO DATE: 15 GY
RAD ONC ARIA REFERENCE POINT ID: NORMAL
RAD ONC ARIA REFERENCE POINT SESSION DOSAGE GIVEN: 2.5 GY

## 2022-08-23 PROCEDURE — 77385: CPT | Performed by: RADIOLOGY

## 2022-08-24 ENCOUNTER — HOSPITAL ENCOUNTER (OUTPATIENT)
Dept: RADIATION ONCOLOGY | Facility: HOSPITAL | Age: 67
Setting detail: RADIATION/ONCOLOGY SERIES
Discharge: HOME | End: 2022-08-24

## 2022-08-24 LAB
RAD ONC ARIA COURSE ID: NORMAL
RAD ONC ARIA COURSE LAST TREATMENT DATE: NORMAL
RAD ONC ARIA COURSE START DATE: NORMAL
RAD ONC ARIA COURSE TREATMENT ELAPSED DAYS: 8
RAD ONC ARIA FIRST TREATMENT DATE: NORMAL
RAD ONC ARIA PLAN FRACTIONS TREATED TO DATE: 7
RAD ONC ARIA PLAN ID: NORMAL
RAD ONC ARIA PLAN PRESCRIBED DOSE PER FRACTION: 2.5 GY
RAD ONC ARIA PLAN PRIMARY REFERENCE POINT: NORMAL
RAD ONC ARIA PLAN TOTAL FRACTIONS PRESCRIBED: 16
RAD ONC ARIA PLAN TOTAL PRESCRIBED DOSE: 4000 CGY
RAD ONC ARIA REFERENCE POINT DOSAGE GIVEN TO DATE: 17.5 GY
RAD ONC ARIA REFERENCE POINT ID: NORMAL
RAD ONC ARIA REFERENCE POINT SESSION DOSAGE GIVEN: 2.5 GY

## 2022-08-24 PROCEDURE — 77385: CPT | Performed by: RADIOLOGY

## 2022-08-25 ENCOUNTER — HOSPITAL ENCOUNTER (OUTPATIENT)
Dept: RADIATION ONCOLOGY | Facility: HOSPITAL | Age: 67
Setting detail: RADIATION/ONCOLOGY SERIES
Discharge: HOME | End: 2022-08-25

## 2022-08-25 LAB
RAD ONC ARIA COURSE ID: NORMAL
RAD ONC ARIA COURSE LAST TREATMENT DATE: NORMAL
RAD ONC ARIA COURSE START DATE: NORMAL
RAD ONC ARIA COURSE TREATMENT ELAPSED DAYS: 9
RAD ONC ARIA FIRST TREATMENT DATE: NORMAL
RAD ONC ARIA PLAN FRACTIONS TREATED TO DATE: 8
RAD ONC ARIA PLAN ID: NORMAL
RAD ONC ARIA PLAN PRESCRIBED DOSE PER FRACTION: 2.5 GY
RAD ONC ARIA PLAN PRIMARY REFERENCE POINT: NORMAL
RAD ONC ARIA PLAN TOTAL FRACTIONS PRESCRIBED: 16
RAD ONC ARIA PLAN TOTAL PRESCRIBED DOSE: 4000 CGY
RAD ONC ARIA REFERENCE POINT DOSAGE GIVEN TO DATE: 20 GY
RAD ONC ARIA REFERENCE POINT ID: NORMAL
RAD ONC ARIA REFERENCE POINT SESSION DOSAGE GIVEN: 2.5 GY

## 2022-08-25 PROCEDURE — 77336 RADIATION PHYSICS CONSULT: CPT | Performed by: RADIOLOGY

## 2022-08-25 PROCEDURE — 77385: CPT | Performed by: RADIOLOGY

## 2022-08-26 ENCOUNTER — HOSPITAL ENCOUNTER (OUTPATIENT)
Dept: RADIATION ONCOLOGY | Facility: HOSPITAL | Age: 67
Setting detail: RADIATION/ONCOLOGY SERIES
Discharge: HOME | End: 2022-08-26

## 2022-08-26 LAB
RAD ONC ARIA COURSE ID: NORMAL
RAD ONC ARIA COURSE LAST TREATMENT DATE: NORMAL
RAD ONC ARIA COURSE START DATE: NORMAL
RAD ONC ARIA COURSE TREATMENT ELAPSED DAYS: 10
RAD ONC ARIA FIRST TREATMENT DATE: NORMAL
RAD ONC ARIA PLAN FRACTIONS TREATED TO DATE: 9
RAD ONC ARIA PLAN ID: NORMAL
RAD ONC ARIA PLAN PRESCRIBED DOSE PER FRACTION: 2.5 GY
RAD ONC ARIA PLAN PRIMARY REFERENCE POINT: NORMAL
RAD ONC ARIA PLAN TOTAL FRACTIONS PRESCRIBED: 16
RAD ONC ARIA PLAN TOTAL PRESCRIBED DOSE: 4000 CGY
RAD ONC ARIA REFERENCE POINT DOSAGE GIVEN TO DATE: 22.5 GY
RAD ONC ARIA REFERENCE POINT ID: NORMAL
RAD ONC ARIA REFERENCE POINT SESSION DOSAGE GIVEN: 2.5 GY

## 2022-08-26 PROCEDURE — 77385: CPT | Performed by: RADIOLOGY

## 2022-08-29 ENCOUNTER — HOSPITAL ENCOUNTER (OUTPATIENT)
Dept: RADIATION ONCOLOGY | Facility: HOSPITAL | Age: 67
Setting detail: RADIATION/ONCOLOGY SERIES
Discharge: HOME | End: 2022-08-29

## 2022-08-29 LAB
RAD ONC ARIA COURSE ID: NORMAL
RAD ONC ARIA COURSE LAST TREATMENT DATE: NORMAL
RAD ONC ARIA COURSE START DATE: NORMAL
RAD ONC ARIA COURSE TREATMENT ELAPSED DAYS: 13
RAD ONC ARIA FIRST TREATMENT DATE: NORMAL
RAD ONC ARIA PLAN FRACTIONS TREATED TO DATE: 10
RAD ONC ARIA PLAN ID: NORMAL
RAD ONC ARIA PLAN PRESCRIBED DOSE PER FRACTION: 2.5 GY
RAD ONC ARIA PLAN PRIMARY REFERENCE POINT: NORMAL
RAD ONC ARIA PLAN TOTAL FRACTIONS PRESCRIBED: 16
RAD ONC ARIA PLAN TOTAL PRESCRIBED DOSE: 4000 CGY
RAD ONC ARIA REFERENCE POINT DOSAGE GIVEN TO DATE: 25 GY
RAD ONC ARIA REFERENCE POINT ID: NORMAL
RAD ONC ARIA REFERENCE POINT SESSION DOSAGE GIVEN: 2.5 GY

## 2022-08-29 PROCEDURE — 77385: CPT | Performed by: RADIOLOGY

## 2022-08-30 ENCOUNTER — HOSPITAL ENCOUNTER (OUTPATIENT)
Dept: RADIATION ONCOLOGY | Facility: HOSPITAL | Age: 67
Setting detail: RADIATION/ONCOLOGY SERIES
Discharge: HOME | End: 2022-08-30

## 2022-08-30 LAB
RAD ONC ARIA COURSE ID: NORMAL
RAD ONC ARIA COURSE LAST TREATMENT DATE: NORMAL
RAD ONC ARIA COURSE START DATE: NORMAL
RAD ONC ARIA COURSE TREATMENT ELAPSED DAYS: 14
RAD ONC ARIA FIRST TREATMENT DATE: NORMAL
RAD ONC ARIA PLAN FRACTIONS TREATED TO DATE: 11
RAD ONC ARIA PLAN ID: NORMAL
RAD ONC ARIA PLAN PRESCRIBED DOSE PER FRACTION: 2.5 GY
RAD ONC ARIA PLAN PRIMARY REFERENCE POINT: NORMAL
RAD ONC ARIA PLAN TOTAL FRACTIONS PRESCRIBED: 16
RAD ONC ARIA PLAN TOTAL PRESCRIBED DOSE: 4000 CGY
RAD ONC ARIA REFERENCE POINT DOSAGE GIVEN TO DATE: 27.5 GY
RAD ONC ARIA REFERENCE POINT ID: NORMAL
RAD ONC ARIA REFERENCE POINT SESSION DOSAGE GIVEN: 2.5 GY

## 2022-08-30 PROCEDURE — 77385: CPT | Performed by: RADIOLOGY

## 2022-08-31 ENCOUNTER — HOSPITAL ENCOUNTER (OUTPATIENT)
Dept: RADIATION ONCOLOGY | Facility: HOSPITAL | Age: 67
Setting detail: RADIATION/ONCOLOGY SERIES
Discharge: HOME | End: 2022-08-31

## 2022-08-31 LAB
RAD ONC ARIA COURSE ID: NORMAL
RAD ONC ARIA COURSE LAST TREATMENT DATE: NORMAL
RAD ONC ARIA COURSE START DATE: NORMAL
RAD ONC ARIA COURSE TREATMENT ELAPSED DAYS: 15
RAD ONC ARIA FIRST TREATMENT DATE: NORMAL
RAD ONC ARIA PLAN FRACTIONS TREATED TO DATE: 12
RAD ONC ARIA PLAN ID: NORMAL
RAD ONC ARIA PLAN PRESCRIBED DOSE PER FRACTION: 2.5 GY
RAD ONC ARIA PLAN PRIMARY REFERENCE POINT: NORMAL
RAD ONC ARIA PLAN TOTAL FRACTIONS PRESCRIBED: 16
RAD ONC ARIA PLAN TOTAL PRESCRIBED DOSE: 4000 CGY
RAD ONC ARIA REFERENCE POINT DOSAGE GIVEN TO DATE: 30 GY
RAD ONC ARIA REFERENCE POINT ID: NORMAL
RAD ONC ARIA REFERENCE POINT SESSION DOSAGE GIVEN: 2.5 GY

## 2022-08-31 PROCEDURE — 77385: CPT | Performed by: RADIOLOGY

## 2022-09-01 ENCOUNTER — HOSPITAL ENCOUNTER (OUTPATIENT)
Dept: RADIATION ONCOLOGY | Facility: HOSPITAL | Age: 67
Setting detail: RADIATION/ONCOLOGY SERIES
End: 2022-09-01

## 2022-09-01 ENCOUNTER — HOSPITAL ENCOUNTER (OUTPATIENT)
Dept: RADIATION ONCOLOGY | Facility: HOSPITAL | Age: 67
Setting detail: RADIATION/ONCOLOGY SERIES
Discharge: HOME | End: 2022-09-01

## 2022-09-01 LAB
RAD ONC ARIA COURSE ID: NORMAL
RAD ONC ARIA COURSE LAST TREATMENT DATE: NORMAL
RAD ONC ARIA COURSE START DATE: NORMAL
RAD ONC ARIA COURSE TREATMENT ELAPSED DAYS: 16
RAD ONC ARIA FIRST TREATMENT DATE: NORMAL
RAD ONC ARIA PLAN FRACTIONS TREATED TO DATE: 13
RAD ONC ARIA PLAN ID: NORMAL
RAD ONC ARIA PLAN PRESCRIBED DOSE PER FRACTION: 2.5 GY
RAD ONC ARIA PLAN PRIMARY REFERENCE POINT: NORMAL
RAD ONC ARIA PLAN TOTAL FRACTIONS PRESCRIBED: 16
RAD ONC ARIA PLAN TOTAL PRESCRIBED DOSE: 4000 CGY
RAD ONC ARIA REFERENCE POINT DOSAGE GIVEN TO DATE: 32.5 GY
RAD ONC ARIA REFERENCE POINT ID: NORMAL
RAD ONC ARIA REFERENCE POINT SESSION DOSAGE GIVEN: 2.5 GY

## 2022-09-01 PROCEDURE — 77385: CPT | Performed by: RADIOLOGY

## 2022-09-01 PROCEDURE — 77336 RADIATION PHYSICS CONSULT: CPT | Performed by: RADIOLOGY

## 2022-09-02 ENCOUNTER — HOSPITAL ENCOUNTER (OUTPATIENT)
Dept: RADIATION ONCOLOGY | Facility: HOSPITAL | Age: 67
Setting detail: RADIATION/ONCOLOGY SERIES
Discharge: HOME | End: 2022-09-02

## 2022-09-02 LAB
RAD ONC ARIA COURSE ID: NORMAL
RAD ONC ARIA COURSE LAST TREATMENT DATE: NORMAL
RAD ONC ARIA COURSE START DATE: NORMAL
RAD ONC ARIA COURSE TREATMENT ELAPSED DAYS: 17
RAD ONC ARIA FIRST TREATMENT DATE: NORMAL
RAD ONC ARIA PLAN FRACTIONS TREATED TO DATE: 14
RAD ONC ARIA PLAN ID: NORMAL
RAD ONC ARIA PLAN PRESCRIBED DOSE PER FRACTION: 2.5 GY
RAD ONC ARIA PLAN PRIMARY REFERENCE POINT: NORMAL
RAD ONC ARIA PLAN TOTAL FRACTIONS PRESCRIBED: 16
RAD ONC ARIA PLAN TOTAL PRESCRIBED DOSE: 4000 CGY
RAD ONC ARIA REFERENCE POINT DOSAGE GIVEN TO DATE: 35 GY
RAD ONC ARIA REFERENCE POINT ID: NORMAL
RAD ONC ARIA REFERENCE POINT SESSION DOSAGE GIVEN: 2.5 GY

## 2022-09-02 PROCEDURE — 77385: CPT | Performed by: RADIOLOGY

## 2022-09-06 ENCOUNTER — HOSPITAL ENCOUNTER (OUTPATIENT)
Dept: RADIATION ONCOLOGY | Facility: HOSPITAL | Age: 67
Setting detail: RADIATION/ONCOLOGY SERIES
Discharge: HOME | End: 2022-09-06

## 2022-09-06 LAB
RAD ONC ARIA COURSE ID: NORMAL
RAD ONC ARIA COURSE LAST TREATMENT DATE: NORMAL
RAD ONC ARIA COURSE START DATE: NORMAL
RAD ONC ARIA COURSE TREATMENT ELAPSED DAYS: 21
RAD ONC ARIA FIRST TREATMENT DATE: NORMAL
RAD ONC ARIA PLAN FRACTIONS TREATED TO DATE: 15
RAD ONC ARIA PLAN ID: NORMAL
RAD ONC ARIA PLAN PRESCRIBED DOSE PER FRACTION: 2.5 GY
RAD ONC ARIA PLAN PRIMARY REFERENCE POINT: NORMAL
RAD ONC ARIA PLAN TOTAL FRACTIONS PRESCRIBED: 16
RAD ONC ARIA PLAN TOTAL PRESCRIBED DOSE: 4000 CGY
RAD ONC ARIA REFERENCE POINT DOSAGE GIVEN TO DATE: 37.5 GY
RAD ONC ARIA REFERENCE POINT ID: NORMAL
RAD ONC ARIA REFERENCE POINT SESSION DOSAGE GIVEN: 2.5 GY

## 2022-09-06 PROCEDURE — 77385: CPT | Performed by: RADIOLOGY

## 2022-09-07 ENCOUNTER — HOSPITAL ENCOUNTER (OUTPATIENT)
Dept: RADIATION ONCOLOGY | Facility: HOSPITAL | Age: 67
Discharge: HOME | End: 2022-09-07

## 2022-09-07 LAB
RAD ONC ARIA COURSE ID: NORMAL
RAD ONC ARIA COURSE LAST TREATMENT DATE: NORMAL
RAD ONC ARIA COURSE START DATE: NORMAL
RAD ONC ARIA COURSE TREATMENT ELAPSED DAYS: 22
RAD ONC ARIA FIRST TREATMENT DATE: NORMAL
RAD ONC ARIA PLAN FRACTIONS TREATED TO DATE: 16
RAD ONC ARIA PLAN ID: NORMAL
RAD ONC ARIA PLAN PRESCRIBED DOSE PER FRACTION: 2.5 GY
RAD ONC ARIA PLAN PRIMARY REFERENCE POINT: NORMAL
RAD ONC ARIA PLAN TOTAL FRACTIONS PRESCRIBED: 16
RAD ONC ARIA PLAN TOTAL PRESCRIBED DOSE: 4000 CGY
RAD ONC ARIA REFERENCE POINT DOSAGE GIVEN TO DATE: 40 GY
RAD ONC ARIA REFERENCE POINT ID: NORMAL
RAD ONC ARIA REFERENCE POINT SESSION DOSAGE GIVEN: 2.5 GY

## 2022-09-07 PROCEDURE — 77385: CPT | Performed by: RADIOLOGY

## 2022-09-08 ENCOUNTER — HOSPITAL ENCOUNTER (OUTPATIENT)
Dept: RADIATION ONCOLOGY | Facility: HOSPITAL | Age: 67
Discharge: HOME | End: 2022-09-08

## 2022-09-08 LAB
RAD ONC ARIA COURSE ID: NORMAL
RAD ONC ARIA COURSE LAST TREATMENT DATE: NORMAL
RAD ONC ARIA COURSE START DATE: NORMAL
RAD ONC ARIA COURSE TREATMENT ELAPSED DAYS: 23
RAD ONC ARIA FIRST TREATMENT DATE: NORMAL
RAD ONC ARIA PLAN FRACTIONS TREATED TO DATE: 1
RAD ONC ARIA PLAN ID: NORMAL
RAD ONC ARIA PLAN PRESCRIBED DOSE PER FRACTION: 2.5 GY
RAD ONC ARIA PLAN PRIMARY REFERENCE POINT: NORMAL
RAD ONC ARIA PLAN TOTAL FRACTIONS PRESCRIBED: 4
RAD ONC ARIA PLAN TOTAL PRESCRIBED DOSE: 1000 CGY
RAD ONC ARIA REFERENCE POINT DOSAGE GIVEN TO DATE: 42.5 GY
RAD ONC ARIA REFERENCE POINT ID: NORMAL
RAD ONC ARIA REFERENCE POINT SESSION DOSAGE GIVEN: 2.5 GY

## 2022-09-08 PROCEDURE — 77385: CPT | Performed by: RADIOLOGY

## 2022-09-09 ENCOUNTER — INFUSION (OUTPATIENT)
Dept: ONCOLOGY | Facility: HOSPITAL | Age: 67
End: 2022-09-09

## 2022-09-09 ENCOUNTER — HOSPITAL ENCOUNTER (OUTPATIENT)
Dept: RADIATION ONCOLOGY | Facility: HOSPITAL | Age: 67
Setting detail: RADIATION/ONCOLOGY SERIES
Discharge: HOME | End: 2022-09-09

## 2022-09-09 VITALS
WEIGHT: 214 LBS | SYSTOLIC BLOOD PRESSURE: 121 MMHG | BODY MASS INDEX: 34.39 KG/M2 | HEIGHT: 66 IN | HEART RATE: 67 BPM | TEMPERATURE: 97.6 F | RESPIRATION RATE: 18 BRPM | OXYGEN SATURATION: 100 % | DIASTOLIC BLOOD PRESSURE: 63 MMHG

## 2022-09-09 DIAGNOSIS — C61 PROSTATE CANCER: Primary | ICD-10-CM

## 2022-09-09 LAB
RAD ONC ARIA COURSE ID: NORMAL
RAD ONC ARIA COURSE LAST TREATMENT DATE: NORMAL
RAD ONC ARIA COURSE START DATE: NORMAL
RAD ONC ARIA COURSE TREATMENT ELAPSED DAYS: 24
RAD ONC ARIA FIRST TREATMENT DATE: NORMAL
RAD ONC ARIA PLAN FRACTIONS TREATED TO DATE: 2
RAD ONC ARIA PLAN ID: NORMAL
RAD ONC ARIA PLAN PRESCRIBED DOSE PER FRACTION: 2.5 GY
RAD ONC ARIA PLAN PRIMARY REFERENCE POINT: NORMAL
RAD ONC ARIA PLAN TOTAL FRACTIONS PRESCRIBED: 4
RAD ONC ARIA PLAN TOTAL PRESCRIBED DOSE: 1000 CGY
RAD ONC ARIA REFERENCE POINT DOSAGE GIVEN TO DATE: 45 GY
RAD ONC ARIA REFERENCE POINT ID: NORMAL
RAD ONC ARIA REFERENCE POINT SESSION DOSAGE GIVEN: 2.5 GY

## 2022-09-09 PROCEDURE — 96402 CHEMO HORMON ANTINEOPL SQ/IM: CPT

## 2022-09-09 PROCEDURE — 77385: CPT | Performed by: RADIOLOGY

## 2022-09-09 PROCEDURE — 25010000002 LEUPROLIDE ACETATE PER 7.5 MG: Performed by: UROLOGY

## 2022-09-09 RX ADMIN — LEUPROLIDE ACETATE 7.5 MG: KIT at 09:16

## 2022-09-12 ENCOUNTER — HOSPITAL ENCOUNTER (OUTPATIENT)
Dept: RADIATION ONCOLOGY | Facility: HOSPITAL | Age: 67
Setting detail: RADIATION/ONCOLOGY SERIES
Discharge: HOME | End: 2022-09-12

## 2022-09-12 LAB
RAD ONC ARIA COURSE ID: NORMAL
RAD ONC ARIA COURSE LAST TREATMENT DATE: NORMAL
RAD ONC ARIA COURSE START DATE: NORMAL
RAD ONC ARIA COURSE TREATMENT ELAPSED DAYS: 27
RAD ONC ARIA FIRST TREATMENT DATE: NORMAL
RAD ONC ARIA PLAN FRACTIONS TREATED TO DATE: 3
RAD ONC ARIA PLAN ID: NORMAL
RAD ONC ARIA PLAN PRESCRIBED DOSE PER FRACTION: 2.5 GY
RAD ONC ARIA PLAN PRIMARY REFERENCE POINT: NORMAL
RAD ONC ARIA PLAN TOTAL FRACTIONS PRESCRIBED: 4
RAD ONC ARIA PLAN TOTAL PRESCRIBED DOSE: 1000 CGY
RAD ONC ARIA REFERENCE POINT DOSAGE GIVEN TO DATE: 47.5 GY
RAD ONC ARIA REFERENCE POINT ID: NORMAL
RAD ONC ARIA REFERENCE POINT SESSION DOSAGE GIVEN: 2.5 GY

## 2022-09-12 PROCEDURE — 77385: CPT | Performed by: RADIOLOGY

## 2022-09-13 ENCOUNTER — HOSPITAL ENCOUNTER (OUTPATIENT)
Dept: RADIATION ONCOLOGY | Facility: HOSPITAL | Age: 67
Setting detail: RADIATION/ONCOLOGY SERIES
Discharge: HOME | End: 2022-09-13

## 2022-09-13 LAB
RAD ONC ARIA COURSE ID: NORMAL
RAD ONC ARIA COURSE LAST TREATMENT DATE: NORMAL
RAD ONC ARIA COURSE START DATE: NORMAL
RAD ONC ARIA COURSE TREATMENT ELAPSED DAYS: 28
RAD ONC ARIA FIRST TREATMENT DATE: NORMAL
RAD ONC ARIA PLAN FRACTIONS TREATED TO DATE: 4
RAD ONC ARIA PLAN ID: NORMAL
RAD ONC ARIA PLAN PRESCRIBED DOSE PER FRACTION: 2.5 GY
RAD ONC ARIA PLAN PRIMARY REFERENCE POINT: NORMAL
RAD ONC ARIA PLAN TOTAL FRACTIONS PRESCRIBED: 4
RAD ONC ARIA PLAN TOTAL PRESCRIBED DOSE: 1000 CGY
RAD ONC ARIA REFERENCE POINT DOSAGE GIVEN TO DATE: 50 GY
RAD ONC ARIA REFERENCE POINT ID: NORMAL
RAD ONC ARIA REFERENCE POINT SESSION DOSAGE GIVEN: 2.5 GY

## 2022-09-13 PROCEDURE — 77385: CPT | Performed by: RADIOLOGY

## 2022-09-14 ENCOUNTER — HOSPITAL ENCOUNTER (OUTPATIENT)
Dept: RADIATION ONCOLOGY | Facility: HOSPITAL | Age: 67
Setting detail: RADIATION/ONCOLOGY SERIES
Discharge: HOME | End: 2022-09-14

## 2022-09-14 LAB
RAD ONC ARIA COURSE ID: NORMAL
RAD ONC ARIA COURSE LAST TREATMENT DATE: NORMAL
RAD ONC ARIA COURSE START DATE: NORMAL
RAD ONC ARIA COURSE TREATMENT ELAPSED DAYS: 29
RAD ONC ARIA FIRST TREATMENT DATE: NORMAL
RAD ONC ARIA PLAN FRACTIONS TREATED TO DATE: 1
RAD ONC ARIA PLAN ID: NORMAL
RAD ONC ARIA PLAN PRESCRIBED DOSE PER FRACTION: 2.5 GY
RAD ONC ARIA PLAN PRIMARY REFERENCE POINT: NORMAL
RAD ONC ARIA PLAN TOTAL FRACTIONS PRESCRIBED: 4
RAD ONC ARIA PLAN TOTAL PRESCRIBED DOSE: 1000 CGY
RAD ONC ARIA REFERENCE POINT DOSAGE GIVEN TO DATE: 52.5 GY
RAD ONC ARIA REFERENCE POINT ID: NORMAL
RAD ONC ARIA REFERENCE POINT SESSION DOSAGE GIVEN: 2.5 GY

## 2022-09-14 PROCEDURE — 77336 RADIATION PHYSICS CONSULT: CPT | Performed by: RADIOLOGY

## 2022-09-14 PROCEDURE — 77385: CPT | Performed by: RADIOLOGY

## 2022-09-16 ENCOUNTER — HOSPITAL ENCOUNTER (OUTPATIENT)
Dept: RADIATION ONCOLOGY | Facility: HOSPITAL | Age: 67
Setting detail: RADIATION/ONCOLOGY SERIES
Discharge: HOME | End: 2022-09-16

## 2022-09-16 LAB
RAD ONC ARIA COURSE ID: NORMAL
RAD ONC ARIA COURSE LAST TREATMENT DATE: NORMAL
RAD ONC ARIA COURSE START DATE: NORMAL
RAD ONC ARIA COURSE TREATMENT ELAPSED DAYS: 31
RAD ONC ARIA FIRST TREATMENT DATE: NORMAL
RAD ONC ARIA PLAN FRACTIONS TREATED TO DATE: 2
RAD ONC ARIA PLAN ID: NORMAL
RAD ONC ARIA PLAN PRESCRIBED DOSE PER FRACTION: 2.5 GY
RAD ONC ARIA PLAN PRIMARY REFERENCE POINT: NORMAL
RAD ONC ARIA PLAN TOTAL FRACTIONS PRESCRIBED: 4
RAD ONC ARIA PLAN TOTAL PRESCRIBED DOSE: 1000 CGY
RAD ONC ARIA REFERENCE POINT DOSAGE GIVEN TO DATE: 55 GY
RAD ONC ARIA REFERENCE POINT ID: NORMAL
RAD ONC ARIA REFERENCE POINT SESSION DOSAGE GIVEN: 2.5 GY

## 2022-09-16 PROCEDURE — 77385: CPT | Performed by: RADIOLOGY

## 2022-09-20 ENCOUNTER — HOSPITAL ENCOUNTER (OUTPATIENT)
Dept: RADIATION ONCOLOGY | Facility: HOSPITAL | Age: 67
Discharge: HOME | End: 2022-09-20

## 2022-09-20 LAB
RAD ONC ARIA COURSE ID: NORMAL
RAD ONC ARIA COURSE LAST TREATMENT DATE: NORMAL
RAD ONC ARIA COURSE START DATE: NORMAL
RAD ONC ARIA COURSE TREATMENT ELAPSED DAYS: 35
RAD ONC ARIA FIRST TREATMENT DATE: NORMAL
RAD ONC ARIA PLAN FRACTIONS TREATED TO DATE: 3
RAD ONC ARIA PLAN ID: NORMAL
RAD ONC ARIA PLAN PRESCRIBED DOSE PER FRACTION: 2.5 GY
RAD ONC ARIA PLAN PRIMARY REFERENCE POINT: NORMAL
RAD ONC ARIA PLAN TOTAL FRACTIONS PRESCRIBED: 4
RAD ONC ARIA PLAN TOTAL PRESCRIBED DOSE: 1000 CGY
RAD ONC ARIA REFERENCE POINT DOSAGE GIVEN TO DATE: 57.5 GY
RAD ONC ARIA REFERENCE POINT ID: NORMAL
RAD ONC ARIA REFERENCE POINT SESSION DOSAGE GIVEN: 2.5 GY

## 2022-09-20 PROCEDURE — 77385: CPT | Performed by: RADIOLOGY

## 2022-09-21 ENCOUNTER — HOSPITAL ENCOUNTER (OUTPATIENT)
Dept: RADIATION ONCOLOGY | Facility: HOSPITAL | Age: 67
Discharge: HOME | End: 2022-09-21

## 2022-09-21 LAB
RAD ONC ARIA COURSE ID: NORMAL
RAD ONC ARIA COURSE LAST TREATMENT DATE: NORMAL
RAD ONC ARIA COURSE START DATE: NORMAL
RAD ONC ARIA COURSE TREATMENT ELAPSED DAYS: 36
RAD ONC ARIA FIRST TREATMENT DATE: NORMAL
RAD ONC ARIA PLAN FRACTIONS TREATED TO DATE: 4
RAD ONC ARIA PLAN ID: NORMAL
RAD ONC ARIA PLAN PRESCRIBED DOSE PER FRACTION: 2.5 GY
RAD ONC ARIA PLAN PRIMARY REFERENCE POINT: NORMAL
RAD ONC ARIA PLAN TOTAL FRACTIONS PRESCRIBED: 4
RAD ONC ARIA PLAN TOTAL PRESCRIBED DOSE: 1000 CGY
RAD ONC ARIA REFERENCE POINT DOSAGE GIVEN TO DATE: 60 GY
RAD ONC ARIA REFERENCE POINT ID: NORMAL
RAD ONC ARIA REFERENCE POINT SESSION DOSAGE GIVEN: 2.5 GY

## 2022-09-21 PROCEDURE — 77385: CPT | Performed by: RADIOLOGY

## 2022-09-22 ENCOUNTER — HOSPITAL ENCOUNTER (OUTPATIENT)
Dept: RADIATION ONCOLOGY | Facility: HOSPITAL | Age: 67
Discharge: HOME | End: 2022-09-22

## 2022-09-22 LAB
RAD ONC ARIA COURSE ID: NORMAL
RAD ONC ARIA COURSE LAST TREATMENT DATE: NORMAL
RAD ONC ARIA COURSE START DATE: NORMAL
RAD ONC ARIA COURSE TREATMENT ELAPSED DAYS: 37
RAD ONC ARIA FIRST TREATMENT DATE: NORMAL
RAD ONC ARIA PLAN FRACTIONS TREATED TO DATE: 1
RAD ONC ARIA PLAN ID: NORMAL
RAD ONC ARIA PLAN PRESCRIBED DOSE PER FRACTION: 2.5 GY
RAD ONC ARIA PLAN PRIMARY REFERENCE POINT: NORMAL
RAD ONC ARIA PLAN TOTAL FRACTIONS PRESCRIBED: 4
RAD ONC ARIA PLAN TOTAL PRESCRIBED DOSE: 1000 CGY
RAD ONC ARIA REFERENCE POINT DOSAGE GIVEN TO DATE: 62.5 GY
RAD ONC ARIA REFERENCE POINT ID: NORMAL
RAD ONC ARIA REFERENCE POINT SESSION DOSAGE GIVEN: 2.5 GY

## 2022-09-22 PROCEDURE — 77336 RADIATION PHYSICS CONSULT: CPT | Performed by: RADIOLOGY

## 2022-09-22 PROCEDURE — 77385: CPT | Performed by: RADIOLOGY

## 2022-09-23 ENCOUNTER — HOSPITAL ENCOUNTER (OUTPATIENT)
Dept: RADIATION ONCOLOGY | Facility: HOSPITAL | Age: 67
Discharge: HOME | End: 2022-09-23

## 2022-09-23 PROCEDURE — 77385: CPT | Performed by: RADIOLOGY

## 2022-09-26 ENCOUNTER — HOSPITAL ENCOUNTER (OUTPATIENT)
Dept: RADIATION ONCOLOGY | Facility: HOSPITAL | Age: 67
Setting detail: RADIATION/ONCOLOGY SERIES
Discharge: HOME | End: 2022-09-26

## 2022-09-26 LAB
RAD ONC ARIA COURSE ID: NORMAL
RAD ONC ARIA COURSE LAST TREATMENT DATE: NORMAL
RAD ONC ARIA COURSE START DATE: NORMAL
RAD ONC ARIA COURSE TREATMENT ELAPSED DAYS: 41
RAD ONC ARIA FIRST TREATMENT DATE: NORMAL
RAD ONC ARIA PLAN FRACTIONS TREATED TO DATE: 3
RAD ONC ARIA PLAN ID: NORMAL
RAD ONC ARIA PLAN PRESCRIBED DOSE PER FRACTION: 2.5 GY
RAD ONC ARIA PLAN PRIMARY REFERENCE POINT: NORMAL
RAD ONC ARIA PLAN TOTAL FRACTIONS PRESCRIBED: 4
RAD ONC ARIA PLAN TOTAL PRESCRIBED DOSE: 1000 CGY
RAD ONC ARIA REFERENCE POINT DOSAGE GIVEN TO DATE: 67.5 GY
RAD ONC ARIA REFERENCE POINT ID: NORMAL
RAD ONC ARIA REFERENCE POINT SESSION DOSAGE GIVEN: 2.5 GY

## 2022-09-26 PROCEDURE — 77385: CPT | Performed by: RADIOLOGY

## 2022-09-27 ENCOUNTER — HOSPITAL ENCOUNTER (OUTPATIENT)
Dept: RADIATION ONCOLOGY | Facility: HOSPITAL | Age: 67
Setting detail: RADIATION/ONCOLOGY SERIES
Discharge: HOME | End: 2022-09-27

## 2022-09-27 LAB
RAD ONC ARIA COURSE ID: NORMAL
RAD ONC ARIA COURSE LAST TREATMENT DATE: NORMAL
RAD ONC ARIA COURSE START DATE: NORMAL
RAD ONC ARIA COURSE TREATMENT ELAPSED DAYS: 42
RAD ONC ARIA FIRST TREATMENT DATE: NORMAL
RAD ONC ARIA PLAN FRACTIONS TREATED TO DATE: 4
RAD ONC ARIA PLAN ID: NORMAL
RAD ONC ARIA PLAN PRESCRIBED DOSE PER FRACTION: 2.5 GY
RAD ONC ARIA PLAN PRIMARY REFERENCE POINT: NORMAL
RAD ONC ARIA PLAN TOTAL FRACTIONS PRESCRIBED: 4
RAD ONC ARIA PLAN TOTAL PRESCRIBED DOSE: 1000 CGY
RAD ONC ARIA REFERENCE POINT DOSAGE GIVEN TO DATE: 70 GY
RAD ONC ARIA REFERENCE POINT ID: NORMAL
RAD ONC ARIA REFERENCE POINT SESSION DOSAGE GIVEN: 2.5 GY

## 2022-09-27 PROCEDURE — 77385: CPT | Performed by: RADIOLOGY

## 2022-09-27 PROCEDURE — 77336 RADIATION PHYSICS CONSULT: CPT | Performed by: RADIOLOGY

## 2022-10-07 ENCOUNTER — INFUSION (OUTPATIENT)
Dept: ONCOLOGY | Facility: HOSPITAL | Age: 67
End: 2022-10-07

## 2022-10-07 VITALS
TEMPERATURE: 97.6 F | WEIGHT: 215 LBS | HEIGHT: 66 IN | DIASTOLIC BLOOD PRESSURE: 69 MMHG | OXYGEN SATURATION: 100 % | RESPIRATION RATE: 18 BRPM | HEART RATE: 57 BPM | SYSTOLIC BLOOD PRESSURE: 132 MMHG | BODY MASS INDEX: 34.55 KG/M2

## 2022-10-07 DIAGNOSIS — C61 PROSTATE CANCER: Primary | ICD-10-CM

## 2022-10-07 PROCEDURE — 96402 CHEMO HORMON ANTINEOPL SQ/IM: CPT

## 2022-10-07 PROCEDURE — 25010000002 LEUPROLIDE 45 MG KIT: Performed by: UROLOGY

## 2022-10-07 RX ADMIN — LEUPROLIDE ACETATE 45 MG: KIT at 13:25

## 2022-10-07 NOTE — PROGRESS NOTES
Patient:  Ella Mendoza  YOB: 1955  Date of Service: 10/18/2022  MRN: [id number]    Primary Care Physician: EMILIA Wren    Chief Complaint   Patient presents with    Follow-up         Patient Seen, Chart, Consults notes, Labs, Radiology studies reviewed. Subjective:    Ella Mendoza \"Donny\"  is a 78 y.o. male originally referred by Dr. Eliza Olea for Stage III B(T3b, N0, cM0) Prostatic adenocarcinoma with a PSA of 139.0 on 6/17/2022. CT scan of the abdomen and pelvis with contrast at [facility] on 5/31/2022 did not show evidence of abdominal or pelvic lymphadenopathy. Bone scan on 5/31/2022 did not show scintigraphic evidence of osteoblastic disease. Dose #1 of Firmagon 240 mg was delivered by Dr. Sebastian Esteban on 6/17/2022    XRT consisted of seed and gel placement  in Early August. Daily radiation therapy began 8/16/22 for a total of 28 fractions totaling 7000 cGy, completed 9/27/2022. Mr. Meagan Nava switched to Depo-Lupron 45 mg delivered on 10/7/2022 as a 6-month injection. Kayleigh Peralta presents today accompanied by his significant other for continued monitoring and review. #1Tumor History: Stage IIIB (T3b, N0, cM0) adenocarcinoma of the prostate on 5/18/2022  [place] Gonzalez was seen in initial consultation on 6/28/2022, referred by Dr. Eliza Olea for an oncological opinion regarding a new diagnosis of prostate cancer. PSA levels  1/14/2019 - 1.71  8/19/2020 - 3.50  4/27/2022 - 52.30  6/17/2022 - 139.00    Appointment with Maryan Dawkins on 05/10/2022:  He has elected to proceed with TRUS and biopsy of the prostate      Prostate biopsy per Maryan Dawkins on 05/18/2022:  Prostate, right mid lateral, needle biopsy:   Adenocarcinoma, acinar type, [place] grade 4+4 = 8, involving approximately 80% of the fragmented biopsied tissue (Grade Group 4). Perineural invasion present.   Prostate, left apex, needle biopsy:  Adenocarcinoma, acinar type, [place] grade 4+3 = 7, discontinuously involving approximately 90% of the total length of the biopsied tissue (Grade Group 3). Perineural invasion present. Prostate, left lateral apex, needle biopsy:  Adenocarcinoma, acinar type, [place] grade 4+4 = 8, involving approximately 80% of the fragmented biopsied tissue (Grade Group 4). Perineural invasion present. Prostate, left base, needle biopsy:   Adenocarcinoma, acinar type, [place] grade 4+4 = 8, discontinuously involving greater than 95% of the length of the biopsied tissue (Grade Group 4). Prostate, left lateral base, needle biopsy:  Adenocarcinoma, acinar type, [place] grade 4+4 = 8, discontinuously involving greater than 95% of the total length of the biopsied tissue (Grade Group 4). Perineural invasion present. Prostate, left mid, needle biopsy:  Adenocarcinoma, acinar type, [place] grade 4+4 = 8, discontinuously involving greater than 95% of the total length of the fragmented biopsy tissue (Grade Group 4). Perineural invasion present. Prostate, left mid lateral, needle biopsy:   Adenocarcinoma, acinar type, [place] grade 4+4 = 8, involving approximately 60% of the fragmented biopsied tissue (Grade Group 4). Prostate, right apex, needle biopsy:   Adenocarcinoma, acinar type, [place] grade 4+4 = 8, involving approximately 60% of the fragmented biopsied tissue (Grade Group 4). Prostate, right lateral apex, needle biopsy:  Adenocarcinoma, acinar type, [place] grade 4+4 = 8, discontinuously involving approximately 80% of the length of the biopsied tissue (Grade Group 4). Perineural invasion present. Prostate, right base, needle biopsy:  Adenocarcinoma, acinar type, [place] grade 4+4 = 8, discontinuously involving greater than 95% of the total length of the biopsied tissue (Grade Group 4). Perineural invasion present.   Prostate, right lateral base, needle biopsy:  Adenocarcinoma, acinar type, [place] grade 4+4 = 8, discontinuously involving greater than 95% of the length of the biopsied tissue (Grade Group 4). Perineural invasion present. Prostate, right mid, needle biopsy:   Adenocarcinoma, acinar type, [place] grade 4+4 = 8, discontinuously involving greater than 95% of the length of the biopsied tissue (Grade Group 4). Right seminal vesicle, needle biopsy:   Adenocarcinoma, acinar type, [place] grade 4+4 = 8, discontinuously involving approximately 80% of the length of the biopsied tissue (Grade Group 4). Left seminal vesicle, needle biopsy: Benign seminal vesicle/ejaculatory duct tissue. AJCC stage: T1c pN not assigned (no lymph nodes submitted or found)     CT ABDOMEN PELVIS W CONTRAST at [facility] on 5/31/2022:  Prostate is not significantly enlarged. There is an area of heterogeneous enhancement along the lower posterior aspect of the prostate which is incompletely suboptimally visualized and evaluated  There are small fat-containing inguinal hernias bilaterally, left larger than the right. There is a tiny fat-containing umbilical hernia. No acute abnormality of the abdomen or pelvis, particularly, no evidence of bony metastatic disease  There is no evidence of abdominal or pelvic lymphadenopathy       NM BONE SCAN WHOLE BODY at [facility] on 5/31/2022: There is no scintigraphic evidence of osteoblastic disease. Marked degenerative changes present in the medial joint compartment of the left knee     Follow Up with  on 6/2/2022:  He has elected to undergo EBRT as definitive management. I recommended that he undergo at least 24 months of androgen deprivation therapy. It appears in the high risk group that patients getting 24-36 months of ADT have improved survival compared to shorter duration. Based on this discussion, the patient has opted for EBRT + 3 years of ADT. Will refer to Dr. Ryan Granger. Patient will also be seeing Dr. Dominique Baumgarten. He has worked with him because ROSA ZAVALA was a nurse here at [facility] that retired recently.  He would like the opinion of an Oncologist. I admit I am unaware of any trials that would involve neoadjuvant chemotherapy or second line hormonal therapy but this has a high likelihood of being needed at some point. This will go ahead and plug him in with Dr. Mayi Gallardo. I think he would feel better to be sure our plans would align. Initial Consult with Dr. Carroll Banegas on 6/15/2022: A definitive course of fractionated radiation therapy to the prostate is recommended, anticipate a course of 7000 cGy over 28 treatment fractions. The patient verbalizes understanding, voices no further questions and wishes to proceed with recommended therapy. PSA ASAP  Referral to urology for hormone shots, seed/gel placement   Referral to Dr. Patrica Casper on seed and gel placement in Early August. Daily radiation therapy in mid August to October 2022. YAF=[id number] on 6/17/2022    Dose #1 of Firmagon 240 mg was delivered by Dr. Mai Torres on 6/17/2022    TRANSRECTAL ULTRASOUND GUIDED PLACEMENT PERIRECTAL SPACER APPLICATION FOR RADIATION TREATMENT AND PROSTATE FIDUCIARY MARKERS by Dr Ross Marcelo at [facility] on 7/8/2022 had a CBC drawn today will need another 1 year to see if they can get run to us    XRT consisted of seed and gel placement  in Early August. Daily radiation therapy began 8/16/22 for a total of 28 fractions totaling 7000 cGy, completed 9/27/2022. Mr. Bishop Melo switched to Depo-Lupron 45 mg delivered on 10/7/2022 as a 6-month injection. TREATMENT SUMMARY:  ADT treatment was initiated by Dr. aMi Torres on 6/17/2022 with Trevin Duran with a baseline PSA at 139.0  XRT consisted of seed and gel placement  in Early August. Daily radiation therapy began 8/16/22 for a total of 28 fractions totaling 7000 cGy, completed 9/27/2022.          Allergies:  Nitroglycerin    Medicines:  Current Outpatient Medications   Medication Sig Dispense Refill    irbesartan (AVAPRO) 300 MG tablet Take 1 tablet by mouth nightly 90 tablet 1    HYDROcodone-acetaminophen (NORCO) 5-325 MG per tablet Take 1 tablet by mouth every 8 hours as needed for Pain (back and knee pain) for up to 30 days. 90 tablet 0    LORazepam (ATIVAN) 1 MG tablet Take 1 tablet by mouth in the morning and 1 tablet before bedtime. Do all this for 90 days. 180 tablet 0    meclizine (ANTIVERT) 25 MG tablet Take 1 tablet by mouth 3 times daily as needed for Dizziness 270 tablet 1    potassium chloride (KLOR-CON M) 20 MEQ extended release tablet Take 1 tablet by mouth every other day 60 tablet 1    furosemide (LASIX) 20 MG tablet Take 1 tablet by mouth in the morning. 90 tablet 1    simvastatin (ZOCOR) 40 MG tablet Take 0.5 tablets by mouth nightly 90 tablet 1    buPROPion (WELLBUTRIN) 75 MG tablet Take 75 mg by mouth daily as needed      cloNIDine (CATAPRES) 0.1 MG tablet Take 1 tablet by mouth as needed for High Blood Pressure 180 tablet 3    amLODIPine (NORVASC) 10 MG tablet Take 1 tablet by mouth in the morning. 90 tablet 1     No current facility-administered medications for this visit.        Past Medical History:      Diagnosis Date    Anxiety and depression     Atypical chest pain     Elevated lipids     Gastritis     GERD (gastroesophageal reflux disease)     Hepatitis A     Hiatal hernia     History of kidney stones     HTN (hypertension)     Hyperlipidemia     Hypertension     Irregular Z line of esophagus     Libido, decreased     Migraines     Prostate cancer ([facility] [address] 8/3/2022        Past Surgical History:      Procedure Laterality Date    CHOLECYSTECTOMY      COLONOSCOPY  11-    [facility]    COLONOSCOPY      COLONOSCOPY  10-3-06    Dr Shanika Alvarez    COLONOSCOPY  23-99-30    Dr Elise Dennis N/A 8/9/2021    Tresea Celso performed by Luba Valleclilo DO at [address] [place] [address] ARTHROSCOPY Bilateral     KNEE SURGERY Bilateral     2 X'S ON LEFT ONCE ON THE RIGHT    UPPER GASTROINTESTINAL ENDOSCOPY  11-     [facility]    UPPER GASTROINTESTINAL ENDOSCOPY      UPPER GASTROINTESTINAL ENDOSCOPY  8-30-01    Dr Susie Palencia  10-10-06    Dr Susie Palencia  11-18-10    Dr Roman Watson        Family History:      Problem Relation Age of Onset    Heart Disease Father     Heart Disease Sister     Heart Disease Brother     Stomach Cancer Mother     Cancer Sister         hodgkins    Cancer Mother         Social History  Social History     Tobacco Use    Smoking status: Never    Smokeless tobacco: Never   Vaping Use    Vaping Use: Never used   Substance Use Topics    Alcohol use: Yes     Comment: occ. Drug use: No          Review of Systems:  Constitutional: Negative for chills, fatigue, fever or significant weight loss. HENT: Negative for congestion, hearing loss, nosebleeds or sore throat. Eyes: Negative for photophobia, pain, discharge, redness and visual disturbance. Respiratory: Negative for cough, shortness of breath, or wheezing. Cardiovascular: Negative for chest pain, palpitations or leg swelling. Gastrointestinal: Negative for abdominal pain, blood in stool, constipation, diarrhea, nausea or vomiting. Genitourinary: Negative for dysuria, flank pain, frequency, hematuria or urgency. Musculoskeletal: Negative for back pain, joint swelling, myalgias or neck pain. Skin: Negative for rash or petechiae. Neurological: Negative for tremors, seizures, syncope, weakness or headaches. Hematological: No active bruising or bleeding. Psychiatric/Behavioral: Negative for hallucinations. Wt Readings from Last 3 Encounters:   10/18/22 216 lb 4.8 oz (98.1 kg)   08/03/22 215 lb (97.5 kg)   06/28/22 214 lb 1.6 oz (97.1 kg)        Objective:  Vital Signs: Blood pressure 120/82, pulse 65, height 5' 6\" (1.676 m), weight 216 lb 4.8 oz (98.1 kg), SpO2 98 %.       Physical Exam   Constitutional: Oriented to person, place, and time. No acute distress. Head: Normocephalic and atraumatic. Nose: Nose normal.   Mouth/Throat: Oropharynx is clear and moist. No oropharyngeal exudate. Eyes: Pupils are equal and round. Conjunctivae and EOM are normal. No scleral icterus. Neck: Normal range of motion. Neck supple. No JVD. No appreciable thyromegaly. Cardiovascular: Normal rate, regular rhythm, normal heart sounds and intact distal pulses. Exam reveals no gallop, murmurs or friction rub. Pulmonary/Chest: Effort normal and breath sounds normal. No respiratory distress. No wheezes. Abdominal: Soft. Bowel sounds are normal. No organomegally or masses. No tenderness. There is no rebound and no guarding. Musculoskeletal: Normal range of motion. No edema or tenderness. Lymphadenopathy: No cervical, axillary or inguinal lymphadenopathy. Neurological: Alert and oriented to person, place, and time. Cranial nerves are intact. Neurological exam is nonfocal  Skin: Skin is warm and dry. No rash noted. No erythema. No pallor. Psychiatric: Judgment normal.          Labs:  BMP: No results for input(s): NA, K, CL, CO2, PHOS, BUN, CREATININE, CALCIUM in the last 72 hours. CBC:   Recent Labs     10/18/22  1030   WBC 4.35   HGB 14.2   HCT 44.3   .3*          PT/INR: No results for input(s): PROTIME, INR in the last 72 hours. APTT: No results for input(s): APTT in the last 72 hours. Magnesium:No results for input(s): MG in the last 72 hours. Phosphorus:No results for input(s): PHOS in the last 72 hours. Hepatic: No results for input(s): ALKPHOS, ALT, AST, PROT, BILITOT, BILIDIR, LABALBU in the last 72 hours. Cultures:   No results for input(s): CULTURE in the last 72 hours.       ASSESSMENT AND PLAN:    #1  Stage IIIB (T3b, N0, cM0) adenocarcinoma of the prostate on 5/18/2022    Jen Zepeda \"Donny\"  is a 78 y.o. male originally referred by Dr. Christine Doyle for Stage III B(T3b, N0, cM0) Prostatic adenocarcinoma with a PSA of 139.0 on 6/17/2022. CT scan of the abdomen and pelvis with contrast at [facility] on 5/31/2022 did not show evidence of abdominal or pelvic lymphadenopathy. Bone scan on 5/31/2022 did not show scintigraphic evidence of osteoblastic disease. Dose #1 of Firmagon 240 mg was delivered by Dr. Michael Yates on 6/17/2022    XRT consisted of seed and gel placement  in Early August. Daily radiation therapy began 8/16/22 for a total of 28 fractions totaling 7000 cGy, completed 9/27/2022. Mr. Lacy Bruce switched to Depo-Lupron 45 mg delivered on 10/7/2022 as a 6-month injection. Stew Carmona presents today accompanied by his significant other for continued monitoring and review. PSA levels  1/14/2019 -  1.71  8/19/2020 -  3.50  4/27/2022 -  52.30  6/17/2022 -  139.00  10/18/2022 -         Physical examination today, 6/28/2022:  No evidence of palpable peripheral lymphadenopathy. Lungs are clear heart is regular abdomen is soft and benign  Rectal exam is deferred at patient's request having recently been done by Dr. Michael Yates. Extremities no cyanosis clubbing edema  Neurological exam is grossly intact    He tolerated XRT without significant problems. He does have continued burning on urination being addressed by Dr. Asmita Guerrero. I have reviewed all the treatment that he is received since he was here last in June. I discussed with them the plans going forward and how that plays out in the calendar. He will f/u with Dr Asmita Guerrero on 11/11/22. A PSA is being drawn today and I will repeat that again in 2 months and see him thereafter. No intervention warranted at this juncture.         #2  TUMOR SCREENING AND HEALTH MAINTENANCE    GI cancer screening  Cologuard on 9/7/2021 was negative       #3  Immunizations:  Immunization History   Administered Date(s) Administered    COVID-19, MODERNA BLUE border, Primary or Immunocompromised, (age 12y+), IM, 100 mcg/0.5mL 02/16/2021, 03/16/2021    Influenza, FLUAD, (age 72 y+), Adjuvanted, 0.5mL 11/02/2021    Influenza, FLUARIX, FLULAVAL, FLUZONE (age 10 mo+) AND AFLURIA, (age 1 y+), PF, 0.5mL 10/27/2020    Influenza, Triv, inactivated, subunit, adjuvanted, IM (Fluad 65 yrs and older) 11/05/2019    PPD Test 10/27/2020    Tdap (Boostrix, Adacel) 10/14/2019               Jen was seen today for follow-up. Diagnoses and all orders for this visit:    Prostate cancer ([facility] [address]  -     PSA, Diagnostic; Future  -     Comprehensive Metabolic Panel; Future      Orders Placed This Encounter   Procedures    PSA, Diagnostic     Standing Status:   Future     Standing Expiration Date:   10/17/2023    Comprehensive Metabolic Panel     Standing Status:   Future     Standing Expiration Date:   10/17/2023         No orders of the defined types were placed in this encounter. Return in about 2 months (around 12/18/2022) for follow-up with .

## 2022-10-17 NOTE — TELEPHONE ENCOUNTER
Jen called requesting a refill of the below medication which has been pended for you:     Requested Prescriptions     Pending Prescriptions Disp Refills    irbesartan (AVAPRO) 300 MG tablet 90 tablet 1     Sig: Take 1 tablet by mouth nightly       Last Appointment Date: 8/3/2022  Next Appointment Date: Visit date not found    Allergies   Allergen Reactions    Nitroglycerin Other (See Comments)     Blood pressure problems, very bad, happened in [facility]. Blood pressure problems, very bad, happened in [facility]. Blood pressure problems, very bad, happened in [facility] ER.

## 2022-10-21 PROBLEM — K43.0 INCISIONAL HERNIA, INCARCERATED: Chronic | Status: RESOLVED | Noted: 2021-08-04 | Resolved: 2022-01-01

## 2022-10-21 PROBLEM — M79.A3 NON-TRAUMATIC COMPARTMENT SYNDROME OF ABDOMEN: Status: ACTIVE | Noted: 2022-01-01

## 2022-10-21 PROBLEM — R41.0 DELIRIUM: Status: ACTIVE | Noted: 2022-01-01

## 2022-10-21 PROBLEM — A41.9 SEPSIS (HCC): Status: ACTIVE | Noted: 2022-01-01

## 2022-10-21 PROBLEM — K85.90 PANCREATITIS: Status: ACTIVE | Noted: 2022-01-01

## 2022-10-21 PROBLEM — R65.21 SEPTIC SHOCK (HCC): Status: ACTIVE | Noted: 2022-01-01

## 2022-10-21 NOTE — H&P
[facility] Medicine H&P    Patient:  William Osborne  MRN: [id number]    Consulting Physician: Jake Holbrook DO  Reason for Consult: Medical Management  Primacy Care Physician: EMILIA Billy    HISTORY OF PRESENT ILLNESS:   The patient is a 78 y.o. male presents with altered mental status and low blood pressure. He has a history of stage III prostate carcinoma and has completed a course of radiation therapy. He had follow-up with his oncologist on 10/18. Overall doing fairly well. He presents with altered mental status and was shown to be hypoxic. He did have abdominal distention. Work-up through the emergency department included a CT of the abdomen which showed probable ileus with multiple air-fluid levels. He had a difficult time with his oxygenation. I recommended proceeding with intubation for airway stability and management. This was done by the ER attending as well as central venous access placement. Interestingly on presentation he was not profoundly acidemic. His initial pH was 7.37. Follow-up arterial blood gas after intubation was 7.31. I would like to have general surgery evaluate the patient. We placed an OG tube to suction and immediately got out almost a liter of dark green bilious fluid. He has been cultured and empirically started on broad-spectrum antibiotics.     Past Medical History:        Diagnosis Date    Anxiety and depression     Atypical chest pain     Elevated lipids     Gastritis     GERD (gastroesophageal reflux disease)     Hepatitis A     Hiatal hernia     History of kidney stones     HTN (hypertension)     Hyperlipidemia     Hypertension     Irregular Z line of esophagus     Libido, decreased     Migraines     Prostate cancer ([facility] [address] 8/3/2022       Past Surgical History:        Procedure Laterality Date    CHOLECYSTECTOMY      COLONOSCOPY  11-    [facility]    COLONOSCOPY      COLONOSCOPY  10-3-06    Dr Prabha Garcia    COLONOSCOPY  70-11-67    Dr Prabha Garcia GASTRIC FUNDOPLICATION  4628    GASTRIC FUNDOPLICATION      HEMORRHOID SURGERY      HERNIA REPAIR N/A 8/9/2021    LAPAROSCOPIC ASSISTED ROBOTIC INCARCERATED INCISIONAL HERNIA REPAIR WITH MESH performed by Mana Jacobsen DO at [facility] OR    KNEE ARTHROSCOPY Bilateral     KNEE SURGERY Bilateral     2 X'S ON LEFT ONCE ON THE RIGHT    UPPER GASTROINTESTINAL ENDOSCOPY  11-     [facility]    UPPER GASTROINTESTINAL ENDOSCOPY      UPPER GASTROINTESTINAL ENDOSCOPY  8-30-01    Dr Nils Fong  10-10-06    Dr Nils Fong  11-18-10    Dr Leonel Franco       Medications: Scheduled Meds:   meropenem  2,000 mg IntraVENous Q8H    propofol        LORazepam  2 mg IntraVENous Once     Continuous Infusions:   dexmedetomidine HCl in NaCl 1.5 mcg/kg/hr (10/21/22 1715)    norepinephrine 56 mcg/min (10/21/22 1722)    phenylephrine (JACK-SYNEPHRINE) 50mg/250mL infusion      propofol      lactated ringers       PRN Meds:. Allergies:  Nitroglycerin    Social History:   TOBACCO:   reports that he has never smoked. He has never used smokeless tobacco.  ETOH:   reports current alcohol use. Family History:       Problem Relation Age of Onset    Heart Disease Father     Heart Disease Sister     Heart Disease Brother     Stomach Cancer Mother     Cancer Sister         hodgkins    Cancer Mother        ROS:  Review of Systems   Unable to perform ROS: Intubated     Physical Exam:    Vitals: /87   Pulse 60   Temp 97.8 °F (36.6 °C)   Resp 25   Ht 5' 6\" (1.676 m)   Wt 215 lb (97.5 kg)   SpO2 (!) 86%   BMI 34.70 kg/m²     Physical Exam  Vitals and nursing note reviewed. Constitutional:       Appearance: He is ill-appearing. Interventions: He is sedated and intubated. HENT:      Head: Normocephalic and atraumatic.       Right Ear: External ear normal.      Left Ear: External ear normal.      Nose: Nose normal.      Mouth/Throat:      Mouth: Mucous membranes are moist.   Eyes:      Conjunctiva/sclera: Conjunctivae normal.      Pupils: Pupils are equal, round, and reactive to light. Cardiovascular:      Rate and Rhythm: Normal rate and regular rhythm. Heart sounds: Normal heart sounds. Pulmonary:      Effort: Pulmonary effort is normal. He is intubated. Breath sounds: Normal breath sounds. Abdominal:      General: Abdomen is flat. There is distension. Palpations: Abdomen is soft. There is no mass. Tenderness: There is no abdominal tenderness. There is no right CVA tenderness, left CVA tenderness, guarding or rebound. Musculoskeletal:         General: No swelling. Cervical back: Neck supple. No rigidity. Skin:     General: Skin is warm and dry. Neurological:      Mental Status: He is disoriented. CBC:   Recent Labs     10/21/22  1233   WBC 6.7   HGB 11.5*        BMP:    Recent Labs     10/21/22  1233 10/21/22  1628 10/21/22  1738     --   --    K 4.4 4.1 4.4   CL 99  --   --    CO2 21*  --   --    BUN 45*  --   --    CREATININE 6.3*  --   --    GLUCOSE 142*  --   --      Hepatic:   Recent Labs     10/21/22  1233   AST 12   ALT 28   BILITOT 0.3   ALKPHOS 91     Troponin:   Recent Labs     10/21/22  1233   TROPONINI 0.02     BNP: No results for input(s): BNP in the last 72 hours. Lipids: No results for input(s): CHOL, HDL in the last 72 hours. Invalid input(s): LDLCALCU  ABGs:   Lab Results   Component Value Date/Time    PHART 7.310 10/21/2022 05:38 PM    PO2ART 35.0 10/21/2022 05:38 PM    EHK8MQL 37.0 10/21/2022 05:38 PM     INR: No results for input(s): INR in the last 72 hours. -----------------------------------------------------------------  CT ABDOMEN PELVIS WO CONTRAST Additional Contrast? None    Result Date: 10/21/2022  Exam: CT OF THE ABDOMEN/PELVIS WITHOUT CONTRAST Clinical data: Elevated creatinine. Nausea, vomiting, distended abdomen, hypotension. History of prostate cancer.  Technique: Direct contiguous axial CT images were acquired through the abdomen and pelvis without contrast using soft tissue and bone algorithms. Reformatted/MPR images were performed. Radiation dose: CTDIvol =26.07 mGy, DLP =1727 mGy x cm. Limitations: Lack of intravenous contrast limits evaluation of solid viscera. Lack of oral contrast limits evaluation of the bowel loops. Streak artifact, exacerbated by scanning with patient's arms by side. Prior Studies: No prior studies submitted. Findings: Lung bases: Trace pleural fluid with atelectasis or minor consolidation in the lung bases. Liver:Unremarkable size, contour, and density. No evidence of a discrete hepatic mass on noncontrast evaluation. No evidence of biliary ductal dilation, status post cholecystectomy, with clips in the gallbladder fossa. Spleen: Grossly unremarkable. Pancreas:  Peripancreatic fat stranding, with additional central mesenteric fat stranding, supportive of acute pancreatitis and potentially mesenteric panniculitis. No focal pancreatic abnormality is detected. Adrenal glands: Grossly unremarkable size, contour and density. Kidneys: In anatomic position. Grossly unremarkablerenal size, contour and density. No renal or ureteral calculi. No hydronephrosis. Perinephric space is unremarkable. Retroperitoneum: No retroperitoneal lymphadenopathy. Mild atherosclerotic calcifications of the abdominal aorta. The IVC is grossly unremarkable. Peritoneal cavity: No evidence of free air or ascites. Small umbilical hernia contains only fat. Gastrointestinal tract:  Moderate proximal small bowel distention; distal small bowel is nondistended. Minimal air and stool throughout the colon. Appendix: Nondistended with no periappendiceal fat stranding. Pelvis: Bladder is partially distended. Prostate is not enlarged and contains coarse calcifications and metallic densities.  Osseous structures: Multiple sclerotic foci in the lower lumbar spine in the pelvis, largest in S1, likely metastasis. 1. Peripancreatic and central mesenteric fat stranding, supportive acute of acute pancreatitis and/or mesenteric panniculitis. 2. Moderate proximal small bowel distention with nondistended small distal small bowel, likely ileus versus partial small bowel obstruction. 3. Multiple sclerotic abnormalities in the lower lumbar spine and the pelvis, likely metastatic. Recommendation: Follow up as clinically indicated. All CT scans at this facility utilize dose modulation, iterative reconstruction, and/or weight based dosing when appropriate to reduce radiation dose to as low as reasonably achievable. Electronically Signed by Fortunato Flores MD at 21-Oct-2022 03:14:22 PM EST             CT CHEST WO CONTRAST    Result Date: 10/21/2022  NO PRIOR REPORT AVAILABLE Exam: CT OF THE CHEST WITHOUT CONTRAST Clinical data: Shortness of breath. Elevated creatinine. Abdominal distention. Nausea. Prostate cancer. Technique: Axial CT images through the lungs were acquired without contrast and imaged using soft tissue and lung algorithms. Reformatted/MPR images were performed. Radiation Dose: CTDIvol = 26.07 mGy, DLP = 1727 mGy x cm. Limitations: Lack of intravenous contrast limits evaluation of the soft tissues and vascularity. Prior studies: Radiograph of the chest done on same day. Findings: The thyroid is symmetric and the trachea appears midline. Heart is enlarged with coronary vascular calcification. The aorta is calcified without aneurysm. The pulmonary artery appears normal.  There are compressive atelectatic changes of the dependent  lung parenchyma. No significant mediastinal or hilar adenopathy is identified. There is a 7 mm nodule in the right middle lobe. There is lingular atelectasis. Compressive atelectatic changes dependently recognized. Fluid identified in the major fissure. Upper abdomen demonstrates clips in the gallbladder fossa.   Thoracic vertebral body heights are preserved without compression deformity. The visualized ribs are intact. Cardiomegaly. Atherosclerosis. Compressive dependent atelectasis Right middle lobe nodule. Recommendation: Follow up as clinically indicated. All CT scans at this facility utilize dose modulation, iterative reconstruction, and/or weight based dosing when appropriate to reduce radiation dose to as low as reasonably achievable. Electronically Signed by Mikaela Stover MD at 21-Oct-2022 03:13:40 PM EST             NM LUNG SCAN PERFUSION ONLY    Result Date: 10/21/2022  NO PRIOR REPORT AVAILABLE EXAM: NUCLEAR MEDICINE V/Q SCAN. CLINICAL DATA: Shortness of breath, possible PE, low oxygen. TECHNIQUE: Nuclear medicine VQ scan was performed with 5.0 millicuries of technetium 99m MAA for the perfusion study. PRIOR STUDIES: CT scan of the chest and radiograph of the chest done on same day. FINDINGS: There is homogenous distribution of radiotracer in both lungs on the perfusion images. No large wedge-shaped unmatched defects are identified. Obvious abnormalities including bilateral perihilar and lower lobe infiltrates/pleural effusions are  noted on the corresponding chest radiograph taken today. The study is low probability for pulmonary embolism. Please note according to the Piopped criteria a low probability reflects a 0 to 19% chance of a pulmonary embolism. Please correlate clinically. If symptoms persist, consider CT PA gram.  RECOMMENDATION: Follow up as clinically indicated. Electronically Signed by Osiel Delgadillo MD at 21-Oct-2022 04:14:36 PM EST             XR CHEST PORTABLE    Result Date: 10/21/2022  NO PRIOR REPORT AVAILABLE Exam: X-RAY OF [facility] Clinical data:ETT placement. Technique:Single view of the chest. Prior studies: Same day CT scan of chest also done. Same day radiograph of chest done.  Findings: Interval intubation, with low-lying endotracheal tube, approximately 1 cm above the stephie, suggest pulling back by 2 cm, with a follow-up radiograph. Significant cardiomegaly, as before. Interval subsegmental atelectasis at the right base, and at the left base. No pneumothorax. Gaseous distention of the stomach. Partially visualized nonspecific gas-filled loops of bowel. Otherwise, no significant interval change, with findings as before. Interval intubation, with low-lying endotracheal tube, approximately 1 cm above the stephie, suggest pulling back by 2 cm, with a follow-up radiograph. Other findings, as above. Please see comments above. Recommendation: Follow up as clinically indicated. Electronically Signed by Mitra Renee MD at 21-Oct-2022 06:11:33 PM EST             XR CHEST PORTABLE    Result Date: 10/21/2022  NO PRIOR REPORT AVAILABLE Exam: X-RAY OF [facility] Clinical data:Shortness of breath. Technique:Single view of the chest. Prior studies: No prior studies submitted. Findings: Cardiomegaly. Bilateral perihilar and lower lobe infiltrates. Left pleural effusion. Linear atelectasis or scarring is in the right lower lobe. Cardiac silhouette is within normal limits. No acute osseous abnormality is detected. Ileus pattern. Cardiomegaly. Bilateral perihilar and lower lobe infiltrates. Left pleural effusion. Ileus pattern. Recommendation: Follow up as clinically indicated. Electronically Signed by Elissa Carlos MD at 21-Oct-2022 02:03:51 PM EST                 Assessment and Plan     Sepsis. Uncertain of source. Continue broad-spectrum antibiotics. On  Most likely abdominal versus genitourinary. Continue resuscitation efforts. Ileus. Continue OG drainage. Asked general surgery for an opinion. Acute kidney injury. Aggressive fluid resuscitation. Hopefully just prerenal in nature. Stage IIIb adenocarcinoma of the prostate. Will ask oncology to see for continuation of care. Please document 120 minutes of critical care time for patient assessment, chart review, discussion with staff, .       Vinicio Phane Negrito Ernesto

## 2022-10-21 NOTE — CARE COORDINATION
Patient Contact Information:    [address]  [phone] (home) [phone] (work)  Telephone Information:   Mobile [phone]     Above information verified? [x]   Yes  []   No      Emergency Contacts:    Extended Emergency Contact Information  Primary Emergency Contact: Car Campo   [address] Phone: [phone]  Relation: Domestic Partner      Have you been vaccinated for COVID-19 (SARS-CoV-2)? [x]   Yes  []   No                   If so, when? Which :         []   Pfizer-BioNTech  [x]   Moderna  []   Jenn Lozoya  []   Other:         Pharmacy:    [facility] [address], [address] [phone] Miguel Davenport [phone]  [facility] [id number]  Phone: [phone] Fax: [phone]    [facility], [address] [place] [address] - F [phone]  [address] [facility]  [address] [address]  Phone: [phone] Fax: [phone]          Patient Deficits:    []   Yes   []   No    If yes:    []   Confusion/Memory  []   Visual  []   Motor/Sensory         []   Right arm         []   Right leg         []   Left arm         []   Left leg  []   Language/Speech         []   Aphasia         []   Dysarthria         []   Swallow         [place] Coma Scale  Eye Opening: To speech  Best Verbal Response: Confused  Best Motor Response: Obeys commands  [place] Coma Scale Score: 13    Patient Deficit Notes:   SW met with pt and partner Glen Camachoflor at bedside. Pt was cold and blankets were provided. Pt then kept eyes closed during the assessment. Partner stated that normally pt is independent, but has laid in bed for the past few days.        10/21/22 1436   Service Assessment   Patient Orientation Alert and Oriented   Cognition Alert   History Provided By Significant Other   Primary Caregiver Self   Accompanied By/Relationship Partner   Support Systems Spouse/Significant Other;Family Members Patient's Healthcare Decision Maker is: Legal Next of Kin   PCP Verified by CM Yes   Last Visit to PCP Within last 3 months   Prior Functional Level Independent in ADLs/IADLs   Current Functional Level Assistance with the following:;Bathing;Dressing; Toileting;Feeding;Cooking; Shopping;Mobility   Can patient return to prior living arrangement Yes   Ability to make needs known: Good   Family able to assist with home care needs: Yes   Would you like for me to discuss the discharge plan with any other family members/significant others, and if so, who? Yes  (Partner)   Financial Resources [employer] Resources   (Denied Needs)   CM/SW Referral   (Denied needs)   Social/Functional History   Lives With Significant other   Type of [address] One level   [address] to enter without rails   Entrance Stairs - Number of Steps 3   Bathroom Shower/Tub Walk-in shower   [facility] Help From Family   ADL Assistance Needs assistance   Toileting Needs assistance   [address] assistance   Transfer Assistance Needs assistance   Active  Yes   Mode of Transportation Car   Occupation Retired   Discharge Planning   Type of [address] Prior To Admission None   Potential Assistance Needed N/A   DME Ordered?  No   Potential Assistance Purchasing Medications No   Type of Home Care Services None   Patient expects to be discharged to: House   One/Two Story Residence One story   Services At/After Discharge   Transition of Care Consult (CM Consult)   (Denied Needs)   Services At/After Discharge None   Mode of Transport at Discharge Other (see comment)   Confirm Follow Up Transport Other (see comment)  Lane Swartz Partner)

## 2022-10-21 NOTE — PROGRESS NOTES
Pharmacy Renal Adjustment    Que Walker is a 78 y.o. male. Pharmacy has renally adjusted medications per protocol. Recent Labs     10/21/22  1233   BUN 45*       Recent Labs     10/21/22  1233   CREATININE 6.3*       Estimated Creatinine Clearance: 13 mL/min (A) (based on SCr of 6.3 mg/dL (H)). Height:   Ht Readings from Last 1 Encounters:   10/21/22 5' 6\" (1.676 m)     Weight:  Wt Readings from Last 1 Encounters:   10/21/22 215 lb (97.5 kg)       CrCl =13    Plan: Adjust the following medications based on renal function:           Decrease Lovenox 40 mg Sq daily to 30 mg Sq daily.     Electronically signed by Kimmie Puckett [facility] on 10/21/2022 at 6:34 PM

## 2022-10-21 NOTE — ED NOTES
Pt O2 sat 79% on RA. Pt placed on 2L NC. O2 sat came up to 82 per verbal order of Dr. Diamond Gandhi pt O2 increased to 3L. Pt O2 sat increased to 84% on 3L NC. Dr. Diamond Gandhi increased O2 to 4L NC.  O2 sat currently [address] Maddox RN  10/21/22 5668

## 2022-10-21 NOTE — CONSULTS
[facility]    Patient ID: Ace Aldrich  78 y.o.  male  YOB: 1955    Admitting Diagnosis: Hypoxemia [R09.02]  Delirium [R41.0]  Sepsis ([address] [A41.9]  Hypotension, unspecified hypotension type [I95.9]  Acute renal failure, unspecified acute renal failure type ([address] [N17.9]  Pneumonia of both lungs due to infectious organism, unspecified part of lung [J18.9]  Sepsis with acute renal failure and septic shock, due to unspecified organism, unspecified acute renal failure type ([address] [A41.9, R65.21, N17.9]    Chief Complaint:  Chief Complaint   Patient presents with    Fatigue     Pt arrives to ED via EMS with c/o weakness onset since Wednesday. Pts partner states that he just finished radiation for prostate cancer approx end of September. Nausea     Pt c/o nausea. Pts partner states pt is unable to vomit due to surgery. Pt has been \"nauseous and dry heaving since Wednesday. Subjective:    Mr. Sissy Evangelista is a 78 y.o. male who was brought in by his partner to the emergency room. He is presently intubated and his partner and sister provide history. His partner states he was in his usual state of health when Wednesday he started complaining of nausea and dry heaved. This continued into Thursday. He was able to keep down electrolytes and water at that time. When the dry heaving continued into today, he was brought to the emergency room. He was confused and lethargic, requiring intubation. He is presently hypotensive in the ICU on two pressors. Of note, he recently completed his prostate cancer radiation therapy.       Past Medical History:   Diagnosis Date    Anxiety and depression     Atypical chest pain     Elevated lipids     Gastritis     GERD (gastroesophageal reflux disease)     Hepatitis A     Hiatal hernia     History of kidney stones     HTN (hypertension)     Hyperlipidemia     Hypertension     Irregular Z line of esophagus     Libido, decreased     Migraines Prostate cancer ([facility] [address] 8/3/2022     Past Surgical History:   Procedure Laterality Date    CHOLECYSTECTOMY      COLONOSCOPY  11-    [facility]    COLONOSCOPY      COLONOSCOPY  10-3-06    Dr Ayana Alas    COLONOSCOPY  74-50-78    Dr Daria Lopez N/A 8/9/2021    Karstena Leyda performed by Karlee Lawler DO at [address] ARTHROSCOPY Bilateral     KNEE SURGERY Bilateral     2 X'S ON LEFT ONCE ON THE RIGHT    UPPER GASTROINTESTINAL ENDOSCOPY  11-     [facility]    UPPER GASTROINTESTINAL ENDOSCOPY      UPPER GASTROINTESTINAL ENDOSCOPY  8-30-01    Dr Tatum Zaman  10-10-06    Dr Tatum Zaman  11-18-10    Dr Ayana Alas     Current Facility-Administered Medications   Medication Dose Route Frequency Provider Last Rate Last Admin    sodium chloride flush 0.9 % injection 5-40 mL  5-40 mL IntraVENous 2 times per day Janrichi Espinalk, DO        sodium chloride flush 0.9 % injection 5-40 mL  5-40 mL IntraVENous PRN sIacc Crawford, DO        0.9 % sodium chloride infusion   IntraVENous PRN Isacc Crawford, DO        enoxaparin Sodium (LOVENOX) injection 30 mg  30 mg SubCUTAneous Daily Janrichi Crawford, DO        acetaminophen (TYLENOL) tablet 650 mg  650 mg Oral Q6H PRN Isacc Crawford, DO        Or    acetaminophen (TYLENOL) suppository 650 mg  650 mg Rectal Q6H PRN Isacc Espinalk, DO        lactated ringers bolus  30 mL/kg IntraVENous Once Isacc Crawford, DO        metronidazole (FLAGYL) 500 mg in 0.9% NaCl 100 mL IVPB premix  500 mg IntraVENous Q8H Isacc Crawford, DO        norepinephrine (LEVOPHED) 16 mg in sodium chloride 0.9 % 250 mL infusion  1-100 mcg/min IntraVENous Continuous Elly Good MD 93.8 mL/hr at 10/21/22 1927 100 mcg/min at 10/21/22 1927    phenylephrine (JACK-SYNEPHRINE) 50 mg in dextrose 5 % 250 mL infusion   mcg/min IntraVENous Continuous Alannah Alto, DO 90 mL/hr at 10/21/22 1845 300 mcg/min at 10/21/22 1845    propofol injection  5-50 mcg/kg/min IntraVENous Continuous Alannah Alto, DO 5.9 mL/hr at 10/21/22 1847 10 mcg/kg/min at 10/21/22 1847    lactated ringers infusion   IntraVENous Continuous Alannah Alto,  mL/hr at 10/21/22 1915 New Bag at 10/21/22 1915    vecuronium (NORCURON) injection 10 mg  10 mg IntraVENous Once Alannah Alto, DO        meropenem (MERREM) 1000 mg in sodium chloride 0.9% 50 mL (duplex)  1,000 mg IntraVENous Once Jaison Nguyen  mL/hr at 10/21/22 1953 1,000 mg at 10/21/22 1953    Followed by    Aiyana Quintero ON 10/22/2022] meropenem (MERREM) 500 mg in sodium chloride 0.9% 50 mL (duplex)  500 mg IntraVENous Q12H Jaison Nguyen MD        vasopressin 20 Units in dextrose 5 % 100 mL infusion  0.01-0.03 Units/min IntraVENous Continuous Alannah Alto, DO 9 mL/hr at 10/21/22 1943 0.03 Units/min at 10/21/22 1943     Allergies: Nitroglycerin    Family History   Problem Relation Age of Onset    Heart Disease Father     Heart Disease Sister     Heart Disease Brother     Stomach Cancer Mother     Cancer Sister         hodgkins    Cancer Mother        Social History     Tobacco Use    Smoking status: Never    Smokeless tobacco: Never   Substance Use Topics    Alcohol use: Yes     Comment: occ. Review of Systems   Unable to perform ROS: Acuity of condition     Objective:   BP (!) 71/41   Pulse 64   Temp 97.8 °F (36.6 °C)   Resp 22   Ht 5' 6\" (1.676 m)   Wt 215 lb (97.5 kg)   SpO2 91%   BMI 34.70 kg/m²     Intake/Output Summary (Last 24 hours) at 10/21/2022 1953  Last data filed at 10/21/2022 1830  Gross per 24 hour   Intake 1650.83 ml   Output 825 ml   Net 825.83 ml     Physical Exam  Vitals reviewed.    Constitutional:       Appearance: He is well-developed. He is ill-appearing and toxic-appearing. He is not diaphoretic. HENT:      Head: Normocephalic and atraumatic. Nose: Nose normal.   Cardiovascular:      Rate and Rhythm: Normal rate and regular rhythm. Heart sounds: Normal heart sounds. Pulmonary:      Breath sounds: No wheezing or rales. Comments: ventilated breath sounds  Abdominal:      General: There is distension. Palpations: Abdomen is soft. There is no mass. Comments: Significant abdominal distention with protuberant abdomen. Surgical scars well healed. Small umbilical hernia that is reducible. Musculoskeletal:      Cervical back: Neck supple. Lymphadenopathy:      Cervical: No cervical adenopathy. Skin:     General: Skin is warm and dry. Neurological:      Mental Status: He is disoriented. Comments: Intubated  sedated       Data:  CBC:   Recent Labs     10/21/22  1233   WBC 6.7   RBC 3.32*   HGB 11.5*   HCT 33.9*   .1*   RDW 14.0        BMP:   Recent Labs     10/21/22  1233 10/21/22  1628 10/21/22  1738     --   --    K 4.4 4.1 4.4   CL 99  --   --    CO2 21*  --   --    ANIONGAP 16  --   --    GLUCOSE 142*  --   --    CREATININE 6.3*  --   --    LABGLOM 9*  --   --    CALCIUM 9.5  --   --      LFT:   Recent Labs     10/21/22  1233   PROT 7.0   LABALBU 4.2   BILITOT 0.3   ALKPHOS 91   ALT 28   AST 12     PT/INR:No results for input(s): PROTIME, INR in the last 72 hours. Ct abd pelvis  10/21/2022      Impression   1. Peripancreatic and central mesenteric fat stranding, supportive acute of acute pancreatitis and/or mesenteric panniculitis. 2. Moderate proximal small bowel distention with nondistended small distal small bowel, likely ileus versus partial small bowel obstruction. 3. Multiple sclerotic abnormalities in the lower lumbar spine and the pelvis, likely metastatic. Recommendation: Follow up as clinically indicated.    All CT scans at this facility utilize dose modulation, iterative reconstruction, and/or weight based dosing when appropriate to reduce radiation dose to as low as reasonably achievable. Electronically Signed by Ashanti Gallardo MD at 21-Oct-2022 03:14:22 PM EST                  Assessment:     Principal Problem:    Septic shock [facility])  Active Problems:    Non-traumatic compartment syndrome of abdomen    Pancreatitis    S/P Nissen fundoplication (without gastrostomy tube) procedure  Resolved Problems:    * No resolved hospital problems. *      Plan:   CT scan images reviewed. Distended loops of small bowel with transition point in the lower abdomen with decompressed bowel. Peripancreatic stranding in the setting of normal lipase. Discussed abdominal compartment syndrome as a possible source for his severe hypotension, and decompressive exploratory laparotomy as attempts to increase his chance of survival. Discussed pancreatitis. No reports of etoh intake. Discussed at length with Mr. Zepeda's spouse and sister. He is a DNR and has documents at home. His partner states that Suleiman would not want aggressive measures to save his life in the form of abdominal surgery for decompression for suspected compartment syndrome. His partner understands that he is unlikely to survive in his present state. They are considering removal of his ETT and comfort care. DNR order placed in the chart by his nurse. Surgery will follow peripherally. Condolences offered to his family. It was a pleasure caring for Mr. Bonnie Denver.      Electronically signed by Riki Harris DO on 40/68/3184 at 7:53 PM

## 2022-10-21 NOTE — PROGRESS NOTES
Pt arrived from ER   Dr. Janie Cooper at bedside   Orders placed   BP low, kristel and levo maxed   Bolus running

## 2022-10-22 PROBLEM — R65.21 SEPTIC SHOCK (HCC): Status: RESOLVED | Noted: 2022-01-01 | Resolved: 2022-01-01

## 2022-10-22 PROBLEM — R41.0 DELIRIUM: Status: RESOLVED | Noted: 2022-01-01 | Resolved: 2022-01-01

## 2022-10-22 PROBLEM — K85.90 PANCREATITIS: Status: RESOLVED | Noted: 2022-01-01 | Resolved: 2022-01-01

## 2022-10-22 PROBLEM — A41.9 SEPTIC SHOCK (HCC): Status: RESOLVED | Noted: 2022-01-01 | Resolved: 2022-01-01

## 2022-10-22 PROBLEM — M79.A3 NON-TRAUMATIC COMPARTMENT SYNDROME OF ABDOMEN: Status: RESOLVED | Noted: 2022-01-01 | Resolved: 2022-01-01

## 2022-10-22 PROBLEM — J96.01 ACUTE RESPIRATORY FAILURE WITH HYPOXIA (HCC): Status: ACTIVE | Noted: 2022-01-01

## 2022-10-22 NOTE — DISCHARGE SUMMARY
Discharge Summary    Denise Carmona  :  1955  MRN:  [id number]    Admit date:  10/21/2022  Discharge date:  10/22/2022    Admitting Physician:  Ana Copeland DO    Advance Directive: DNR    Consults: pulmonary/intensive care, general surgery, and hospice    Primary Care Physician:  Manfred Brooks, APRN    Discharge Diagnoses:  Principal Problem (Resolved):    Septic shock [facility])  Active Problems:    * No active hospital problems. *  Resolved Problems:    Non-traumatic compartment syndrome of abdomen    Pancreatitis    Delirium    S/P Nissen fundoplication (without gastrostomy tube) procedure      Significant Diagnostic Studies:   No results found. CBC:   Recent Labs     10/21/22  1233 10/22/22  0300   WBC 6.7 4.6*   HGB 11.5* 10.2*    269     BMP:    Recent Labs     10/21/22  1233 10/21/22  1628 10/21/22  2136 10/22/22  0300 10/22/22  0404     --   --  137  --    K 4.4   < > 3.9 3.5 3.5   CL 99  --   --  100  --    CO2 21*  --   --  20*  --    BUN 45*  --   --  51*  --    CREATININE 6.3*  --   --  7.0*  --    GLUCOSE 142*  --   --  176*  --     < > = values in this interval not displayed. INR: No results for input(s): INR in the last 72 hours. Lipids: No results for input(s): CHOL, HDL in the last 72 hours. Invalid input(s): LDLCALCU  ABGs:  Recent Labs     10/21/22  1231 10/21/22  1628 10/21/22  1738 10/21/22  2136 10/22/22  0404   PHART 7.370 7.250* 7.310* 7.200* 7.430   SPD9MKV 36.0 43.0 37.0 37.0 32.0*   PO2ART 51.0* 62.0* 35.0* 84.0 96.0   WUO3PWX 20.8* 18.9* 18.6* 14.5* 21.2*   BEART -4.0* -7.9* -7.0* -12.7* -2.5*   HGBAE 11.1* 10.7* 11.1* 10.8* 10.6*   D6BUNMXT 86.5* 90.4 66.2* 95.3 96.5   CARBOXHGBART 1.6 1.1 0.9 0.4 0.3   02THERAPY Unknown Unknown Unknown Unknown Unknown     HgBA1c:  No results for input(s): LABA1C in the last 72 hours. Procedures: Endotracheal intubation, mechanical ventilation, central venous access.     Hospital Course: Mr. Rosalina Will was admitted on 10/21 with altered mental status and acute kidney injury. With CT of the abdomen showed evidence of ileus with multiple loops of small bowel with air-fluid levels. He clinically deteriorated in the emergency department. We proceeded with intubation and central venous access placement. He developed profound shock requiring multiple pressors. Consultation was obtained with pulmonology for vent management and general surgery regarding his abdomen. It was felt that he most likely had developed abdominal compartment syndrome. Decompressive laparotomy was offered, however, Mr. Aleyda Bernal did not wish to pursue any heroic measures. 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 He recently completed therapy for stage III prostate carcinoma and had made his wishes well-known that he would not want ventilation and heroic measures. On the morning of 10/22 he was transferred to inpatient hospice for terminal extubation. Physical Exam:  Vital Signs: BP (!) 91/49   Pulse 80   Temp 99 °F (37.2 °C) (Temporal)   Resp 26   Ht 5' 6\" (1.676 m)   Wt 215 lb (97.5 kg)   SpO2 96%   BMI 34.70 kg/m²   General appearance:. Alert and Cooperative   HEENT: Normocephalic. Chest: clear to auscultation bilaterally without wheezes or rhonchi. Cardiac: Normal heart tones with regular rate and rhythm, S1, S2 normal. No murmurs, gallops, or rubs auscultated. Abdomen:soft, non-tender; normal bowel sounds, no masses, no organomegaly. Extremities: No clubbing or cyanosis. No peripheral edema. Peripheral pulses palpable. Neurologic: Grossly intact. Discharge Medications:         Medication List        ASK your doctor about these medications      amLODIPine 10 MG tablet  Commonly known as: NORVASC  Take 1 tablet by mouth in the morning.      buPROPion 75 MG tablet  Commonly known as: WELLBUTRIN     cloNIDine 0.1 MG tablet  Commonly known as: CATAPRES  Take 1 tablet by mouth as needed for High Blood Pressure     furosemide 20 MG tablet  Commonly known as: LASIX  Take 1 tablet by mouth in the morning. HYDROcodone-acetaminophen 5-325 MG per tablet  Commonly known as: NORCO  Take 1 tablet by mouth every 8 hours as needed for Pain (back and knee pain) for up to 30 days. Ask about: Should I take this medication?     irbesartan 300 MG tablet  Commonly known as: AVAPRO  Take 1 tablet by mouth nightly     LORazepam 1 MG tablet  Commonly known as: ATIVAN  Take 1 tablet by mouth in the morning and 1 tablet before bedtime. Do all this for 90 days. meclizine 25 MG tablet  Commonly known as: ANTIVERT  Take 1 tablet by mouth 3 times daily as needed for Dizziness     potassium chloride 20 MEQ extended release tablet  Commonly known as: KLOR-CON M  Take 1 tablet by mouth every other day     simvastatin 40 MG tablet  Commonly known as: ZOCOR  Take 0.5 tablets by mouth nightly              Disposition: Patient was transferred to inpatient hospice. Time spent on discharge 40 minutes.     Signed:  Shukri Dahl DO

## 2022-10-22 NOTE — ED PROVIDER NOTES
[facility] ICU  eMERGENCY dEPARTMENT eNCOUnter      Pt Name: Porter Oneill  MRN: [id number]  [place] 1955  Date of evaluation: 10/21/2022  Provider: Baron Dawson MD    [address] [place] [address]       Chief Complaint   Patient presents with    Fatigue     Pt arrives to ED via EMS with c/o weakness onset since Wednesday. Pts partner states that he just finished radiation for prostate cancer approx end of September. Nausea     Pt c/o nausea. Pts partner states pt is unable to vomit due to surgery. Pt has been \"nauseous and dry heaving since Wednesday. HISTORY OF PRESENT ILLNESS   (Location/Symptom, Timing/Onset,Context/Setting, Quality, Duration, Modifying Factors, Severity)  Note limiting factors. Porter Oneill is a 78 y.o. male who presents to the emergency department with altered mental status. Significant other states that patient has been ill since Wednesday morning (2 days ago). Patient has been experiencing symptoms of fatigue, nausea, and generalized weakness. Patient has recently been diagnosed with \"an aggressive form of prostate cancer. \"  Patient is not currently on chemotherapy. On Wednesday morning patient felt ill but was out of bed moving around. Yesterday, patient was \"in bed all day. \"  Last night at approximately 6:30 PM patient got up and was moving around but did not eat or drink for most of the day. This morning, significant other states that patient was in bed and lethargic. Patient was talking to him intermittently but did not get up and get out of bed. Patient has had nausea for the past 2 to 3 days. He had episodes of dry heaves but \"he cannot vomit because he has a Nissen. \"  Patient had a Nissen performed due to gastroesophageal reflux disease and Lowery's esophagus. This was not a recent surgery.   Significant other states that patient has not experienced fever, diarrhea, constipation, hematemesis, black or bloody stools, difficulty urinating, or burning with urination. Patient has had hernia surgeries and a cholecystectomy as well as a prostate biopsy in the past.  Patient has not complained of abdominal pain but has been notes that patient's abdomen looks distended. HPI    NursingNotes were reviewed. REVIEW OF SYSTEMS    (2-9 systems for level 4, 10 or more for level 5)     Review of Systems   Unable to perform ROS: Mental status change   Constitutional:  Positive for fatigue. Negative for fever. Gastrointestinal:  Positive for nausea and vomiting (Dry heaves). Negative for diarrhea. Neurological:  Positive for weakness. Difficulty arousing, not responsive to verbal stimuli, sleepiness   Psychiatric/Behavioral:  Positive for confusion.            PAST MEDICALHISTORY     Past Medical History:   Diagnosis Date    Anxiety and depression     Atypical chest pain     Elevated lipids     Gastritis     GERD (gastroesophageal reflux disease)     Hepatitis A     Hiatal hernia     History of kidney stones     HTN (hypertension)     Hyperlipidemia     Hypertension     Irregular Z line of esophagus     Libido, decreased     Migraines     Prostate cancer ([facility] [address] 8/3/2022         SURGICAL HISTORY       Past Surgical History:   Procedure Laterality Date    CHOLECYSTECTOMY      COLONOSCOPY  11-    [facility]    COLONOSCOPY      COLONOSCOPY  10-3-06    Dr Roswell Leyden    COLONOSCOPY  08-59-34    Dr Mejia Garrido N/A 8/9/2021    Philipp Lopez performed by Sanjeev Bustos DO at [facility] [address] [place] [address] ARTHROSCOPY Bilateral     KNEE SURGERY Bilateral     2 X'S ON LEFT ONCE ON THE RIGHT    UPPER GASTROINTESTINAL ENDOSCOPY  11-     [facility]    UPPER GASTROINTESTINAL ENDOSCOPY      UPPER GASTROINTESTINAL ENDOSCOPY  8-30-01    Dr Jocelin Montero  10-10-06    Dr Peg Mina GASTROINTESTINAL ENDOSCOPY  11-18-10    Dr Omar Hdz     Current Discharge Medication List        CONTINUE these medications which have NOT CHANGED    Details   irbesartan (AVAPRO) 300 MG tablet Take 1 tablet by mouth nightly  Qty: 90 tablet, Refills: 1      LORazepam (ATIVAN) 1 MG tablet Take 1 tablet by mouth in the morning and 1 tablet before bedtime. Do all this for 90 days. Qty: 180 tablet, Refills: 0    Associated Diagnoses: Anxiety      amLODIPine (NORVASC) 10 MG tablet Take 1 tablet by mouth in the morning. Qty: 90 tablet, Refills: 1      meclizine (ANTIVERT) 25 MG tablet Take 1 tablet by mouth 3 times daily as needed for Dizziness  Qty: 270 tablet, Refills: 1    Associated Diagnoses: Vertigo      potassium chloride (KLOR-CON M) 20 MEQ extended release tablet Take 1 tablet by mouth every other day  Qty: 60 tablet, Refills: 1      furosemide (LASIX) 20 MG tablet Take 1 tablet by mouth in the morning.   Qty: 90 tablet, Refills: 1      simvastatin (ZOCOR) 40 MG tablet Take 0.5 tablets by mouth nightly  Qty: 90 tablet, Refills: 1    Associated Diagnoses: Mixed hyperlipidemia      buPROPion (WELLBUTRIN) 75 MG tablet Take 75 mg by mouth daily as needed      cloNIDine (CATAPRES) 0.1 MG tablet Take 1 tablet by mouth as needed for High Blood Pressure  Qty: 180 tablet, Refills: 3             ALLERGIES     Nitroglycerin    FAMILY HISTORY       Family History   Problem Relation Age of Onset    Heart Disease Father     Heart Disease Sister     Heart Disease Brother     Stomach Cancer Mother     Cancer Sister         hodgkins    Cancer Mother           SOCIAL HISTORY       Social History     Socioeconomic History    Marital status: Single     Spouse name: None    Number of children: None    Years of education: None    Highest education level: None   Tobacco Use    Smoking status: Never    Smokeless tobacco: Never   Vaping Use    Vaping Use: Never used   Substance and Sexual Activity Alcohol use: Yes     Comment: occ. Drug use: No   Social History Narrative    ** Merged History Encounter **         ** Merged History Encounter **          Social Determinants of Health     Financial Resource Strain: Low Risk     Difficulty of Paying Living Expenses: Not hard at all   Food Insecurity: No Food Insecurity    Worried About [address] in the Last Year: Never true    Ran Out of Food in the Last Year: Never true   Physical Activity: Sufficiently Active    Days of Exercise per Week: 7 days    Minutes of Exercise per Session: 60 min       SCREENINGS    Adeola Coma Scale  Eye Opening: To speech  Best Verbal Response: Confused  Best Motor Response: Obeys commands  Hondo Coma Scale Score: 13        PHYSICAL EXAM    (up to 7 for level 4, 8 or more for level 5)     ED Triage Vitals [10/21/22 1213]   BP Temp Temp src Heart Rate Resp SpO2 Height Weight   (!) 86/49 97.8 °F (36.6 °C) -- 82 17 (!) 81 % 5' 6\" (1.676 m) 215 lb (97.5 kg)       Physical Exam  Vitals and nursing note reviewed. Constitutional:       General: He is not in acute distress. Appearance: He is ill-appearing and toxic-appearing. He is not diaphoretic. Comments: Patient is stuporous and does not respond to verbal stimuli. Occasional eye-opening with physical stimuli. Patient's mental status seems to wax and wane as occasionally his eyes were open spontaneously and even more rarely patient will speak when spoken to. HENT:      Head: Normocephalic and atraumatic. Right Ear: External ear normal.      Left Ear: External ear normal.      Nose: Nose normal.      Mouth/Throat:      Mouth: Mucous membranes are dry. Pharynx: Oropharynx is clear. No oropharyngeal exudate. Eyes:      General: No scleral icterus. Conjunctiva/sclera: Conjunctivae normal.      Pupils: Pupils are equal, round, and reactive to light. Cardiovascular:      Rate and Rhythm: Normal rate and regular rhythm. Pulses: Normal pulses. Heart sounds: Normal heart sounds. Comments: Diminished pulses due to hypotension  Pulmonary:      Effort: Pulmonary effort is normal. No respiratory distress. Breath sounds: No stridor. Rales present. No wheezing or rhonchi. Comments: Patient does not appear to be in respiratory distress. Patient has bilateral crackles that are soft with good air entry bilaterally. Abdominal:      General: Bowel sounds are normal. There is distension. Palpations: Abdomen is soft. Tenderness: There is no guarding. Comments: Patient does not grimace or react when abdomen is palpated. Unable to ascertain tenderness. No guarding is palpable. Musculoskeletal:         General: No tenderness or deformity. Cervical back: Neck supple. No rigidity. Skin:     General: Skin is warm. Capillary Refill: Capillary refill takes less than 2 seconds. Coloration: Skin is not jaundiced. Neurological:      Mental Status: He is alert. GCS: GCS eye subscore is 3. GCS verbal subscore is 4. GCS motor subscore is 5. Cranial Nerves: No dysarthria or facial asymmetry. Sensory: Sensation is intact. Motor: Weakness (Generalized) present. Comments: Patient is stuporous. At this time, patient intermittently opens his eyes to physical stimuli and, occasionally, to verbal.  Mental status waxes and wanes. During first 20 minutes after arrival, patient vocalized 1 time when spouse spoke to him. Patient intermittently opens eyes either spontaneously or to stimuli. On later examinations, patient opens eyes spontaneously and speaks briefly to examiner. Patient is ticklish to bottom of feet and jerks feet and kicks out when stroked. Patient unable to follow simple commands at this time. Unable to obtain full neurologic examination. Psychiatric:         Cognition and Memory: Cognition is impaired. Comments: Unable to obtain at this time. DIAGNOSTIC RESULTS     EKG:  All EKG's areinterpreted by the Emergency Department Physician who either signs or Co-signs this chart in the absence of a cardiologist.    EKG dated 10/21/2022 at 1308 p.m.: Normal sinus rhythm, rate 73. T wave flattening diffusely throughout multiple leads. , QRS 95, QTc 482. RADIOLOGY:  Non-plain film images such as CT, Ultrasound and MRI are read by the radiologist. Plain radiographic images are visualized and preliminarily interpreted bythe emergency physician with the below findings:      XR CHEST PORTABLE   Final Result   Tubes and lines, as above. Persistent bibasilar subsegmental atelectasis. No pneumothorax or significant pleural effusions. Recommendation: If there is a clinical concern for an occult lung injury, CT should be considered. Electronically Signed by Patricia De La Fuente MD at 21-Oct-2022 07:38:14 PM EST               XR CHEST PORTABLE   Final Result   Tubes and lines, as above. Other findings, as above. Please see comments above. Recommendation: Follow up as clinically indicated. Electronically Signed by Patricia De La Fuente MD at 21-Oct-2022 07:52:25 PM EST               XR CHEST PORTABLE   Final Result   Interval intubation, with low-lying endotracheal tube, approximately 1 cm above the stephie, suggest pulling back by 2 cm, with a follow-up radiograph. Other findings, as above. Please see comments above. Recommendation: Follow up as clinically indicated. Electronically Signed by Patricia De La Fuente MD at 21-Oct-2022 06:11:33 PM EST               NM LUNG SCAN PERFUSION ONLY   Final Result   The study is low probability for pulmonary embolism. Please note according to the Piopped criteria a low probability reflects a 0 to 19% chance of a pulmonary embolism. Please correlate clinically. If symptoms persist, consider CT PA gram.       RECOMMENDATION: Follow up as clinically indicated.    Electronically Signed by Kiah Rios MD at 21-Oct-2022 04:14:36 PM EST CT CHEST WO CONTRAST   Final Result       Cardiomegaly. Atherosclerosis. Compressive dependent atelectasis   Right middle lobe nodule. Recommendation:   Follow up as clinically indicated. All CT scans at this facility utilize dose modulation, iterative reconstruction, and/or weight based dosing when appropriate to reduce radiation dose to as low as reasonably achievable. Electronically Signed by Victoria Fualkner MD at 21-Oct-2022 03:13:40 PM EST               CT ABDOMEN PELVIS WO CONTRAST Additional Contrast? None   Final Result   1. Peripancreatic and central mesenteric fat stranding, supportive acute of acute pancreatitis and/or mesenteric panniculitis. 2. Moderate proximal small bowel distention with nondistended small distal small bowel, likely ileus versus partial small bowel obstruction. 3. Multiple sclerotic abnormalities in the lower lumbar spine and the pelvis, likely metastatic. Recommendation: Follow up as clinically indicated. All CT scans at this facility utilize dose modulation, iterative reconstruction, and/or weight based dosing when appropriate to reduce radiation dose to as low as reasonably achievable. Electronically Signed by Keith Edward MD at 21-Oct-2022 03:14:22 PM EST               XR CHEST PORTABLE   Final Result   Cardiomegaly. Bilateral perihilar and lower lobe infiltrates. Left pleural effusion. Ileus pattern. Recommendation: Follow up as clinically indicated. Electronically Signed by Minoo Carvajal MD at 21-Oct-2022 02:03:51 PM EST               XR CHEST PORTABLE    (Results Pending)           LABS:  Labs Reviewed   BLOOD GAS, ARTERIAL - Abnormal; Notable for the following components:       Result Value    pO2, Arterial 51.0 (*)     HCO3, Arterial 20.8 (*)     Base Excess, Arterial -4.0 (*)     Hemoglobin, Art, Extended 11.1 (*)     O2 Sat, Arterial 86.5 (*)     All other components within normal limits    Narrative:     CALL  [facility] tel. dr. Melo er, 10/21/2022 12:31, by [facility]   CBC WITH AUTO DIFFERENTIAL - Abnormal; Notable for the following components:    RBC 3.32 (*)     Hemoglobin 11.5 (*)     Hematocrit 33.9 (*)     .1 (*)     MCH 34.6 (*)     Neutrophils % 85.2 (*)     Lymphocytes % 5.4 (*)     Lymphocytes Absolute 0.4 (*)     All other components within normal limits   COMPREHENSIVE METABOLIC PANEL - Abnormal; Notable for the following components:    CO2 21 (*)     Glucose 142 (*)     BUN 45 (*)     Creatinine 6.3 (*)     Est, Glom Filt Rate 9 (*)     All other components within normal limits   URINALYSIS WITH REFLEX TO CULTURE - Abnormal; Notable for the following components:    Color, UA DARK YELLOW (*)     Clarity, UA CLOUDY (*)     Ketones, Urine TRACE (*)     Protein, UA 30 (*)     All other components within normal limits   BLOOD GAS, ARTERIAL - Abnormal; Notable for the following components:    pH, Arterial 7.250 (*)     pO2, Arterial 62.0 (*)     HCO3, Arterial 18.9 (*)     Base Excess, Arterial -7.9 (*)     Hemoglobin, Art, Extended 10.7 (*)     All other components within normal limits    Narrative:     CALL  [facility]  [facility] tel. ,  dr. Melo Ritchie , 10/21/2022 16:29, by [facility]   BLOOD GAS, ARTERIAL - Abnormal; Notable for the following components:    pH, Arterial 7.310 (*)     pO2, Arterial 35.0 (*)     HCO3, Arterial 18.6 (*)     Base Excess, Arterial -7.0 (*)     Hemoglobin, Art, Extended 11.1 (*)     O2 Sat, Arterial 66.2 (*)     All other components within normal limits    Narrative:     CALL  [employer] tel. ,  dr. Melo Ritchie , 10/21/2022 17:39, by [address] [place] [address] - Abnormal; Notable for the following components:    Bacteria, UA NEGATIVE (*)     Crystals, UA NEG (*)     WBC, UA 6 (*)     All other components within normal limits   LACTATE, SEPSIS - Abnormal; Notable for the following components:    Lactic Acid, Sepsis 3.7 (*)     All other components within normal limits    Narrative:     [facility] tel. ,  Chemistry results called to and read back by [facility], 10/21/2022 20:21,  by [id number], SEPSIS - Abnormal; Notable for the following components:    Lactic Acid, Sepsis 5.7 (*)     All other components within normal limits    Narrative:     [facility] tel. ,  Chemistry results called to and read back by [facility] RN in ICU, 10/21/2022  22:03, by [facility]  Collection has been rescheduled by [facility] at 10/21/2022 21:03 Reason:   Doctor with Patient   BLOOD GAS, ARTERIAL - Abnormal; Notable for the following components:    pH, Arterial 7.200 (*)     HCO3, Arterial 14.5 (*)     Base Excess, Arterial -12.7 (*)     Hemoglobin, Art, Extended 10.8 (*)     All other components within normal limits    Narrative:     CALL  [facility]  Jessica Miller RN ICU, 10/21/2022 21:37, by Bernardo Awan   COVID-19, RAPID   RESPIRATORY PANEL, MOLECULAR, WITH COVID-19   CULTURE, BLOOD 1   CULTURE, BLOOD 2   CULTURE, BLOOD 1   CULTURE, BLOOD 2   LACTIC ACID   LIPASE   TROPONIN   PHOSPHORUS   MAGNESIUM   COMPREHENSIVE METABOLIC PANEL   CBC WITH AUTO DIFFERENTIAL   BLOOD GAS, ARTERIAL       All other labs were within normal range or not returned as of this dictation. EMERGENCY DEPARTMENT COURSE and DIFFERENTIAL DIAGNOSIS/MDM:   Vitals:    Vitals:    10/21/22 1845 10/21/22 1855 10/21/22 1900 10/21/22 2156   BP: (!) 77/42  (!) 71/41    Pulse: 69 64 64 84   Resp: 25 22 22 16   Temp:       SpO2: 92% (!) 89% 91% 94%   Weight:       Height:           MDM     Amount and/or Complexity of Data Reviewed  Decide to obtain previous medical records or to obtain history from someone other than the patient: yes    42-year-old male presents with altered mental status after 2-day illness with fatigue nausea and generalized weakness. On presentation, hypoxia and hypotension as well as stuporous mental status are most prominent presentations. Patient has no history of lung disease and quit smoking 20 years ago.   Patient has soft crackles on physical examination. Chest x-ray shows possible pneumonia. Patient is given IV antibiotics of cefepime and vancomycin as well as, later, Merrem. Sepsis is at top of list on differential diagnosis. Patient also started on IV fluids for possible sepsis and hypotension. When IV fluids failed to correct hypotension, Levophed is started. Throughout course, we have difficulty with hypoxia and oxygen initiated on arrival continues to be increased to 15 L nonrebreather. Dr. Bard Osei, intensivist, is brought in on patient care early in course. After discussion with Dr. Bard Osei, spouse, and sister decision is made to intubate patient and attempt to improve oxygenation. After intubation, propofol was to be initiated but we had to switch to Precedex due to profound hypotension. Patient did not receive propofol whatsoever in the emergency department. Despite intubation and 100% oxygenation, hypoxia persists. Lab, EKG, and radiology results have been reviewed throughout the course. Patient is found to have small bowel obstruction versus ileus. Dr. Destini Sahu, general surgery, contacted for consultation. CTs obtained without contrast due to VERONIKA. CTs with evidence of metastatic cancer as well as acute peripancreatic stranding and bowel findings. Central line triple-lumen placed while in the emergency department. Patient sent to the intensive care unit for further evaluation and treatment. Due to the immediate potential for life-threatening deterioration due to hypoxia and hypotension, I spent 90 minutes providing critical care. This time is excluding time spent performing procedures.             CONSULTS:  [facility] TO PULMONOLOGY  IP CONSULT TO PULMONOLOGY  IP CONSULT TO HOSPICE    PROCEDURES:  Unless otherwise noted below, none     Intubation    Date/Time: 10/22/2022 2:27 PM  Performed by: Remedios Frey MD  Authorized by: Alexsandra Shaw DO     Consent:     Consent obtained:  Verbal    Consent given by:  Spouse    Risks, benefits, and alternatives were discussed: yes      Risks discussed:  Hypoxia and death    Alternatives discussed:  No treatment  Universal protocol:     Procedure explained and questions answered to patient or proxy's satisfaction: yes      Relevant documents present and verified: yes      Test results available: yes      Imaging studies available: yes      Patient identity confirmed:  Arm band  Pre-procedure details:     Indication: failure to oxygenate      Patient status:  Altered mental status    Mouth opening - incisor distance:  2 finger widths    Hyoid-mental distance: 2 finger widths      Hyoid-thyroid distance: 2 or more finger widths      Mallampati score: unable to obtain due to [facility] Neck mobility: reduced      Pharmacologic strategy: RSI      Induction agents:  Etomidate    Paralytics:  Succinylcholine  Procedure details:     Preoxygenation:  Bag valve mask    CPR in progress: no      Intubation method:  Oral    Intubation technique: video assisted      Laryngoscope blade:   Mac 4    Bougie used: no      Tube size (mm):  8.0    Tube type:  Cuffed    Number of attempts:  1    Tube visualized through cords: yes    Placement assessment:     ETT at teeth/gumline (cm):  24    Tube secured with:  ETT guzman    Breath sounds:  Equal    Placement verification: colorimetric ETCO2, CXR verification and direct visualization      CXR findings:  Low    Tube repositioned: yes    Post-procedure details:     Procedure completion:  Tolerated well, no immediate complications  Central Line    Date/Time: 10/22/2022 2:35 PM  Performed by: Trang Lebron MD  Authorized by: Luma Roy DO     Consent:     Consent obtained:  Written    Consent given by:  Spouse    Risks, benefits, and alternatives were discussed: yes      Risks discussed:  Bleeding and infection    Alternatives discussed:  No treatment  Universal protocol:     Procedure explained and questions answered to patient or proxy's satisfaction: yes      Immediately prior to procedure, a time out was called: yes      Patient identity confirmed:  Arm band  Pre-procedure details:     Indication(s): central venous access      Hand hygiene: Hand hygiene performed prior to insertion      Sterile barrier technique: All elements of maximal sterile technique followed      Skin preparation:  Chlorhexidine    Skin preparation agent: Skin preparation agent completely dried prior to procedure    Sedation:     Sedation type: Moderate sedation  Anesthesia:     Anesthesia method:  Local infiltration    Local anesthetic:  Lidocaine 1% w/o epi  Procedure details:     Location:  R femoral    Patient position:  Supine    Procedural supplies:  Triple lumen    Landmarks identified: yes      Ultrasound guidance: yes      Ultrasound guidance timing: real time      Sterile ultrasound techniques: Sterile gel and sterile probe covers were used      Number of attempts:  1    Successful placement: yes    Post-procedure details:     Post-procedure:  Dressing applied and line sutured    Assessment:  Blood return through all ports    Procedure completion:  Tolerated    Complications:  Pierced fold of skin with insertion of needle. Guidewire extricated from fold prior to catheter insertion. Comments:      Nursing helped to keep patient from reaching onto field and to hold abdomen out of way. Patient was sedated with Precedex due to prior intubation. FINAL IMPRESSION      1. Delirium    2. Hypoxemia    3. Hypotension, unspecified hypotension type    4. Acute renal failure, unspecified acute renal failure type ([address]    5. Pneumonia of both lungs due to infectious organism, unspecified part of lung    6.  Sepsis with acute renal failure and septic shock, due to unspecified organism, unspecified acute renal failure type [facility])          DISPOSITION/PLAN   DISPOSITION Admitted 10/21/2022 01:12:32 PM               (Please note that portions of this note were completed with a voice recognition program.  Efforts were made to edit thedictations but occasionally words are mis-transcribed.)    Ivy Jaramillo MD (electronically signed)  Attending Emergency Physician          Ivy Jaramillo MD  10/22/22 2257

## 2022-10-22 NOTE — CONSULTS
Pulmonary and Critical Care Consult Note    [facility]    MRN# [id number]    Acct# [de-identified]  10/21/2022   10:31 PM CDT    Referring Uma Barrett, DO      Chief Complaint: resp failure on the vent. Requesting physician: Dr. Paul Fairbanks. Reason for consult: resp failure on the vent. HPI: We have been consulted to see this 78y.o. year old male born on 1955. The patient is known to have stage III prostate cancer. He has been having nausea vomiting and dry heaving since Wednesday of this week. He presented to the emergency room due to altered mental status and low blood pressure. He was hypoxic in the emergency room and became acidotic as the time went by. Had a CT of the abdomen done that showed possible bowel obstruction with multiple air-fluid levels. He was intubated in the emergency room due to severe hypoxia. Orogastric tube was placed and about a liter of green bilious fluid was suctioned out. Currently the patient is a hypotensive on 3 pressors. He is intubated on mechanical ventilation unable to participate in the history. He is having rigors. White blood cell count was normal.  Creatinine was 6.3. General surgery evaluated the patient and it was at that impression at that the patient likely has abdominal compartment syndrome. Decompression laparotomy was offered however the patient's  declined. I was asked to see him regarding the above.       Past Medical History      Past Medical History:   Diagnosis Date    Anxiety and depression     Atypical chest pain     Elevated lipids     Gastritis     GERD (gastroesophageal reflux disease)     Hepatitis A     Hiatal hernia     History of kidney stones     HTN (hypertension)     Hyperlipidemia     Hypertension     Irregular Z line of esophagus     Libido, decreased     Migraines Prostate cancer [facility]) 8/3/2022     SurgicalHistory  Past Surgical History:   Procedure Laterality Date    CHOLECYSTECTOMY      COLONOSCOPY  11-    [facility]    COLONOSCOPY      COLONOSCOPY  10-3-06    Dr Keith Peralta    COLONOSCOPY  78-11-91    Dr Maximilian Cannon N/A 8/9/2021    Emilie Ling performed by Skye Michelle DO at [address] ARTHROSCOPY Bilateral     KNEE SURGERY Bilateral     2 X'S ON LEFT ONCE ON THE RIGHT    UPPER GASTROINTESTINAL ENDOSCOPY  11-     [facility]    UPPER GASTROINTESTINAL ENDOSCOPY      UPPER GASTROINTESTINAL ENDOSCOPY  8-30-01    Dr Mai Ruby  10-10-06    Dr Mai Ruby  11-18-10    Dr Keith Peralta     Allergies  Allergies   Allergen Reactions    Nitroglycerin Other (See Comments)     Blood pressure problems, very bad, happened in [facility]. Blood pressure problems, very bad, happened in [facility]. Blood pressure problems, very bad, happened in [facility]. Medications    sodium chloride flush, 5-40 mL, IntraVENous, 2 times per day    enoxaparin, 30 mg, SubCUTAneous, Daily    metroNIDAZOLE, 500 mg, IntraVENous, Q8H    vecuronium, 10 mg, IntraVENous, Once    [COMPLETED] meropenem, 1,000 mg, IntraVENous, Once **FOLLOWED BY** [START ON 10/22/2022] meropenem, 500 mg, IntraVENous, Q12H   Social History   reports that he has never smoked. He has never used smokeless tobacco. He reports current alcohol use. He reports that he does not use drugs. Family History  family history includes Cancer in his mother and sister; Heart Disease in his brother, father, and sister; Joceline Maclachlan in his mother.     Review of Systems:  12 point review of systems is negative except as below:  Review of systems not obtained due to patient factors - intubation    Physical Exam:  BP (!) 71/41   Pulse 84   Temp 97.8 °F (36.6 °C)   Resp 16   Ht 5' 6\" (1.676 m)   Wt 215 lb (97.5 kg)   SpO2 94%   BMI 34.70 kg/m²   Intake/Output Summary (Last 24 hours) at 10/21/2022 2231  Last data filed at 10/21/2022 1830  Gross per 24 hour   Intake 1650.83 ml   Output 825 ml   Net 825.83 ml       General appearance: Elderly male intubated on mechanical ventilation   HEENT: Normocephalic atraumatic  Heart: S1-S2 distant sounds no murmurs  Lungs: Diminished bilaterally no rubs or tenderness on dullness to percussion  Abdomen: Diffusely distended. Nontender. No bowel sounds. Extremities: No clubbing cyanosis or edema  Neuro: Intubated on mechanical ventilation  Skin: Intact        Labs:  Recent Labs     10/21/22  1233   WBC 6.7   RBC 3.32*   HGB 11.5*   HCT 33.9*      .1*   MCH 34.6*   MCHC 33.9   RDW 14.0      Recent Labs     10/21/22  1231 10/21/22  1233 10/21/22  1628 10/21/22  1738 10/21/22  2136   NA  --  136  --   --   --    K 4.3 4.4 4.1 4.4 3.9   CL  --  99  --   --   --    CO2  --  21*  --   --   --    BUN  --  45*  --   --   --    CREATININE  --  6.3*  --   --   --    CALCIUM  --  9.5  --   --   --    GLUCOSE  --  142*  --   --   --       Recent Labs     10/21/22  1231 10/21/22  1628 10/21/22  1738 10/21/22  2136   PHART 7.370 7.250* 7.310* 7.200*   XHB4VAC 36.0 43.0 37.0 37.0   PO2ART 51.0* 62.0* 35.0* 84.0   UYU4ILN 20.8* 18.9* 18.6* 14.5*   I0TDVFYG 86.5* 90.4 66.2* 95.3   BEART -4.0* -7.9* -7.0* -12.7*     Recent Labs     10/21/22  1233 10/21/22  1240   AST 12  --    ALT 28  --    ALKPHOS 91  --    BILITOT 0.3  --    CALCIUM 9.5  --    TROPONINI 0.02  --    LACTA  --  1.4     No results for input(s): BC, LABGRAM, CULTRESP, BFCX in the last 72 hours. Radiograph: CT ABDOMEN PELVIS WO CONTRAST Additional Contrast? None    Result Date: 10/21/2022  1.  Peripancreatic and central mesenteric fat stranding, supportive acute of acute pancreatitis and/or mesenteric panniculitis. 2. Moderate proximal small bowel distention with nondistended small distal small bowel, likely ileus versus partial small bowel obstruction. 3. Multiple sclerotic abnormalities in the lower lumbar spine and the pelvis, likely metastatic. Recommendation: Follow up as clinically indicated. All CT scans at this facility utilize dose modulation, iterative reconstruction, and/or weight based dosing when appropriate to reduce radiation dose to as low as reasonably achievable. Electronically Signed by Matheus Rodriguez MD at 21-Oct-2022 03:14:22 PM EST             CT CHEST WO CONTRAST    Result Date: 10/21/2022   Cardiomegaly. Atherosclerosis. Compressive dependent atelectasis Right middle lobe nodule. Recommendation: Follow up as clinically indicated. All CT scans at this facility utilize dose modulation, iterative reconstruction, and/or weight based dosing when appropriate to reduce radiation dose to as low as reasonably achievable. Electronically Signed by Nidia Mtz MD at 21-Oct-2022 03:13:40 PM EST             NM LUNG SCAN PERFUSION ONLY    Result Date: 10/21/2022  The study is low probability for pulmonary embolism. Please note according to the Piopped criteria a low probability reflects a 0 to 19% chance of a pulmonary embolism. Please correlate clinically. If symptoms persist, consider CT PA gram.  RECOMMENDATION: Follow up as clinically indicated. Electronically Signed by Jacqueline Block MD at 21-Oct-2022 04:14:36 PM EST             XR CHEST PORTABLE    Result Date: 10/21/2022  Tubes and lines, as above. Other findings, as above. Please see comments above. Recommendation: Follow up as clinically indicated. Electronically Signed by Feliberto Colon MD at 21-Oct-2022 07:52:25 PM EST             XR CHEST PORTABLE    Result Date: 10/21/2022  Tubes and lines, as above. Persistent bibasilar subsegmental atelectasis. No pneumothorax or significant pleural effusions.  Recommendation: If there is a clinical concern for an occult lung injury, CT should be considered. Electronically Signed by Maria Teresa Neil MD at 21-Oct-2022 07:38:14 PM EST             XR CHEST PORTABLE    Result Date: 10/21/2022  Interval intubation, with low-lying endotracheal tube, approximately 1 cm above the stephie, suggest pulling back by 2 cm, with a follow-up radiograph. Other findings, as above. Please see comments above. Recommendation: Follow up as clinically indicated. Electronically Signed by Maria Teresa Neil MD at 21-Oct-2022 06:11:33 PM EST             XR CHEST PORTABLE    Result Date: 10/21/2022  Cardiomegaly. Bilateral perihilar and lower lobe infiltrates. Left pleural effusion. Ileus pattern. Recommendation: Follow up as clinically indicated. Electronically Signed by Wesly Gomez MD at 21-Oct-2022 02:03:51 PM EST                My radiograph interpretation/independent review of imaging: Reviewed    Problem list generated by [facility] Problems             Last Modified POA    * (Principal) Septic shock ([address] 10/21/2022 Yes    Non-traumatic compartment syndrome of abdomen 10/21/2022 Yes    Pancreatitis 10/21/2022 Yes    S/P Nissen fundoplication (without gastrostomy tube) procedure 10/21/2022 Yes          Pulmonary Assessment/plan:    Acute hypoxic respiratory failure likely secondary to early ARDS from sepsis and shock. Continue mechanical ventilation support as necessary to maintain adequate oxygenation. Shock likely distributive such as sepsis and hypovolemia from dehydration versus obstructive such as caused by abdominal compartment syndrome. Limited echo done at the bedside does not show evidence of tamponade. His IVC was dilated. No significant IVC compression. Nursing is in process of attempting intra-abdominal pressure determination. Likely abdominal compartment syndrome. The patient has adequate peripheral pulses in the lower extremities.   Possible sepsis the patient is on broad-spectrum antibiotic therapy empirically. Cultures have been ordered. No evidence of pneumonia on CT of the chest.  Source of sepsis is unclear. Likely bacterial translocation from distended bowel loops versus other etiology. No free air in the abdomen  Acute severe renal failure. Will consult nephrology. Follow-up creatinine. Most Likely due to prerenal factors and possible ATN. Severe metabolic acidosis that is progressively worsening. The patient is on lactated Ringer's. Obesity. DVT and GI prophylaxis. Critical care time 120 min       Stacy Harp MD, [facility], [facility]    The above note was generated using voice recognition software. Inadvertent typographical errors in transcription may have occurred.     Electronically signed by Dotty Cordova MD on 10/21/22 at 10:31 PM

## 2022-10-22 NOTE — PROGRESS NOTES
Latest Reference Range & Units 10/22/22 04:04   FIO2 Not Established % 80.0   O2 Therapy  Unknown   Hemoglobin, Art, Extended 14.0 - 18.0 g/dL 10.6 (L)   pH, Arterial 7.350 - 7.450  7.430   pCO2, Arterial 35.0 - 45.0 mmHg 32.0 (L)   pO2, Arterial 80.0 - 100.0 mmHg 96.0   HCO3, Arterial 22.0 - 26.0 mmol/L 21.2 (L)   Base Excess, Arterial -2.0 - 2.0 mmol/L -2.5 (L)   O2 Sat, Arterial >92 % 96.5   O2 Content, Arterial Not Established mL/dL 14.5   Methemoglobin, Arterial <1.5 % 0.6   Carboxyhgb, Arterial 0.0 - 5.0 % 0.3   Mode  AC/VC   Vt Mechanical Not Established % 450.0   (L): Data is abnormally lowResults given to ROJELIO Katz

## 2022-10-22 NOTE — SIGNIFICANT EVENT
Case d/w spouse and family in detail with his ICU RN present at the bedside as well    Family understands this is not major surgery  Family states he would want to be extubate once sister here  Family states to keep him comfortable and that there will be no surgery    They understand this will likely result in permanent harm soon, and death will likely follow soon

## 2022-10-22 NOTE — PROGRESS NOTES
Latest Reference Range & Units 10/21/22 21:36   O2 Therapy  Unknown   Hemoglobin, Art, Extended 14.0 - 18.0 g/dL 10.8 (L)   pH, Arterial 7.350 - 7.450  7.200 (LL)   pCO2, Arterial 35.0 - 45.0 mmHg 37.0   pO2, Arterial 80.0 - 100.0 mmHg 84.0   HCO3, Arterial 22.0 - 26.0 mmol/L 14.5 (L)   Base Excess, Arterial -2.0 - 2.0 mmol/L -12.7 (L)   O2 Sat, Arterial >92 % 95.3   O2 Content, Arterial Not Established mL/dL 14.6   Methemoglobin, Arterial <1.5 % 0.2   Carboxyhgb, Arterial 0.0 - 5.0 % 0.4   Mode  AC/VC   Vt Mechanical Not Established % 500.0   (LL): Data is critically low  (L): Data is abnormally low  Given to ROJELIO MAXWELL

## 2022-10-22 NOTE — PLAN OF CARE
Delayed entry  Case d/w primary earlier in shift on phone  Case d/w Dr Fred Hayes earlier in shift at bedside      SUBJECTIVE:    Katerine Reyes is a colleague of ours from [facility]       OBJECTIVE:    BP (!) 71/41   Pulse 84   Temp 97.8 °F (36.6 °C)   Resp 16   Ht 5' 6\" (1.676 m)   Wt 215 lb (97.5 kg)   SpO2 94%   BMI 34.70 kg/m²     Physical Exam:  VS as per above  GA: appears stated age, NAD  Head: NC, AT  Neck: Supple, Trachea appears midline  PUL: CTA anterolaterally, No W/R/R nor rubs  COR: RRR, No M/R/G  ABD: Bowel Sounds present, No G/R/T, abdomen not wood hard but firmer than anticipated  MSK: no wasting of fat or muscle stores  Skin: Warm, nondiaphoretic  PSY: unable to assess as per general medical condition      ASSESSMENTS & PLANS:    Abdominal Compartment Syndrome: assessment  Pressure >20 would need decompression , given presentation evn though he is less I have recommended this to family at bedside  General Sx has also recommended this but the spouse would not consent  We had located the pressure monitor to use bladder pressure via sanchez as surrogate of abdominal compartment    Sedated on Vent: / Ventilator Dependant:  ARDS vent settings as recommended by ACCP guidelines  Pulmonary has been consulted, input appreciated as was their seeing the patient with me at the bedside  Have transitioned away from propofol to Versed & Fentanyl, propofol appears to have minimal contribution to hypotension    Lactic Acidosis:  Level climbing  Will continue to aim for MAP 65-70mmHg  Continue to trend  Monitor urinary output        Prognosis: Expectant    Care time ~25 minutes

## 2022-10-24 PROBLEM — B99.9 ILEUS DUE TO INFECTION (HCC): Status: ACTIVE | Noted: 2022-10-24

## 2022-10-24 PROBLEM — K56.7 ILEUS DUE TO INFECTION (HCC): Status: ACTIVE | Noted: 2022-10-24

## 2022-10-26 LAB
BLOOD CULTURE, ROUTINE: NORMAL
BLOOD CULTURE, ROUTINE: NORMAL
CULTURE, BLOOD 2: NORMAL

## 2022-10-27 LAB — CULTURE, BLOOD 2: NORMAL
